# Patient Record
Sex: FEMALE | Race: WHITE | Employment: UNEMPLOYED | URBAN - METROPOLITAN AREA
[De-identification: names, ages, dates, MRNs, and addresses within clinical notes are randomized per-mention and may not be internally consistent; named-entity substitution may affect disease eponyms.]

---

## 2017-01-31 ENCOUNTER — TRANSFERRED RECORDS (OUTPATIENT)
Dept: HEALTH INFORMATION MANAGEMENT | Facility: CLINIC | Age: 22
End: 2017-01-31

## 2017-02-10 ENCOUNTER — TRANSFERRED RECORDS (OUTPATIENT)
Dept: HEALTH INFORMATION MANAGEMENT | Facility: CLINIC | Age: 22
End: 2017-02-10

## 2017-02-21 ENCOUNTER — MEDICAL CORRESPONDENCE (OUTPATIENT)
Dept: HEALTH INFORMATION MANAGEMENT | Facility: CLINIC | Age: 22
End: 2017-02-21

## 2017-02-23 ENCOUNTER — TELEPHONE (OUTPATIENT)
Dept: DERMATOLOGY | Facility: CLINIC | Age: 22
End: 2017-02-23

## 2017-02-23 NOTE — TELEPHONE ENCOUNTER
Contacted pt, VLC explained and pt accepted appt on April 5th at 8:30 am. New patient packet to be mailed as well as scheduling via call center. Pt verbalized understanding and denied questions or concerns. Request sent to call center.

## 2017-02-23 NOTE — TELEPHONE ENCOUNTER
----- Message from Jenna Archer RN sent at 2/23/2017 12:31 PM CST -----  Regarding: Schedule pt for VLC  Please set this patient up for VLC in April 5th.  She has an extensive VM right arm, there is MRI and is in PACs pt coming from [facility].  Per pt preference please call before 2 today or if on Monday before 2 pm.  Pt available Friday after 3pm.    Thanks,   A. Audra Archer, RN, BSN  Interventional Radiology Care Coordinator   Phone:  [phone]

## 2017-02-28 ENCOUNTER — PRE VISIT (OUTPATIENT)
Dept: DERMATOLOGY | Facility: CLINIC | Age: 22
End: 2017-02-28

## 2017-02-28 NOTE — TELEPHONE ENCOUNTER
1.  Date/reason for appt: 4/5/17 - extensive VM right arm     2.  Referring provider: Self     3.  Call to patient (Yes / No - short description): Yes, spoke with patient on the phone.     4.  Previous care at / records requested from:     1. [facility]   2. [facility]     5.  Other: Mailed ELMIRA's to sign.     MRA Chest 2/10/17 ([facility]) uploaded in PACS. - Need report

## 2017-03-09 NOTE — TELEPHONE ENCOUNTER
Radiology report received from [facility]. Waiting for images.   Included  Radiology reports: PICC line placement on 2/10/17   MRA chest on 2/10/17

## 2017-03-10 NOTE — TELEPHONE ENCOUNTER
Records received from [facility].   Included  Office notes: 8/25/15   [place] records: 5/10/11  Radiology reports: MRI right shoulder on 5/9/11- [place]

## 2017-03-17 NOTE — TELEPHONE ENCOUNTER
ELMIRA received for [facility] - faxed cover sheet for records/imaging.

## 2017-03-20 NOTE — TELEPHONE ENCOUNTER
[facility] password and report received. Waiting on imaging disc.     Records received from [facility] - Forwarded to Karin Lund.   Records include:  Plastic Surgery Consult 5/10/11  MRI Ext Upr Report 5/9/11

## 2017-04-05 ENCOUNTER — OFFICE VISIT (OUTPATIENT)
Dept: DERMATOLOGY | Facility: CLINIC | Age: 22
End: 2017-04-05
Attending: DERMATOLOGY
Payer: COMMERCIAL

## 2017-04-05 ENCOUNTER — OFFICE VISIT (OUTPATIENT)
Dept: DERMATOLOGY | Facility: CLINIC | Age: 22
End: 2017-04-05
Attending: RADIOLOGY
Payer: COMMERCIAL

## 2017-04-05 VITALS
BODY MASS INDEX: 37.17 KG/M2 | HEART RATE: 72 BPM | DIASTOLIC BLOOD PRESSURE: 66 MMHG | SYSTOLIC BLOOD PRESSURE: 109 MMHG | HEIGHT: 65 IN | WEIGHT: 223.11 LBS

## 2017-04-05 DIAGNOSIS — Q27.9 VENOUS MALFORMATION: Primary | ICD-10-CM

## 2017-04-05 LAB
APTT PPP: 28 SEC (ref 22–37)
BASOPHILS # BLD AUTO: 0 10E9/L (ref 0–0.2)
BASOPHILS NFR BLD AUTO: 0.2 %
D DIMER PPP FEU-MCNC: 0.4 UG/ML FEU (ref 0–0.5)
DIFFERENTIAL METHOD BLD: NORMAL
EOSINOPHIL # BLD AUTO: 0.1 10E9/L (ref 0–0.7)
EOSINOPHIL NFR BLD AUTO: 1.6 %
ERYTHROCYTE [DISTWIDTH] IN BLOOD BY AUTOMATED COUNT: 13.1 % (ref 10–15)
FIBRINOGEN PPP-MCNC: 401 MG/DL (ref 200–420)
HCT VFR BLD AUTO: 44.7 % (ref 35–47)
HGB BLD-MCNC: 15.3 G/DL (ref 11.7–15.7)
IMM GRANULOCYTES # BLD: 0 10E9/L (ref 0–0.4)
IMM GRANULOCYTES NFR BLD: 0.4 %
INR PPP: 0.9 (ref 0.86–1.14)
LYMPHOCYTES # BLD AUTO: 1.5 10E9/L (ref 0.8–5.3)
LYMPHOCYTES NFR BLD AUTO: 29.5 %
MCH RBC QN AUTO: 30.1 PG (ref 26.5–33)
MCHC RBC AUTO-ENTMCNC: 34.2 G/DL (ref 31.5–36.5)
MCV RBC AUTO: 88 FL (ref 78–100)
MONOCYTES # BLD AUTO: 0.7 10E9/L (ref 0–1.3)
MONOCYTES NFR BLD AUTO: 13.2 %
NEUTROPHILS # BLD AUTO: 2.7 10E9/L (ref 1.6–8.3)
NEUTROPHILS NFR BLD AUTO: 55.1 %
NRBC # BLD AUTO: 0 10*3/UL
NRBC BLD AUTO-RTO: 0 /100
PLATELET # BLD AUTO: 198 10E9/L (ref 150–450)
RBC # BLD AUTO: 5.08 10E12/L (ref 3.8–5.2)
WBC # BLD AUTO: 4.9 10E9/L (ref 4–11)

## 2017-04-05 PROCEDURE — 85384 FIBRINOGEN ACTIVITY: CPT | Performed by: DERMATOLOGY

## 2017-04-05 PROCEDURE — 85025 COMPLETE CBC W/AUTO DIFF WBC: CPT | Performed by: DERMATOLOGY

## 2017-04-05 PROCEDURE — 36415 COLL VENOUS BLD VENIPUNCTURE: CPT | Performed by: DERMATOLOGY

## 2017-04-05 PROCEDURE — 85379 FIBRIN DEGRADATION QUANT: CPT | Performed by: DERMATOLOGY

## 2017-04-05 PROCEDURE — 85397 CLOTTING FUNCT ACTIVITY: CPT | Performed by: DERMATOLOGY

## 2017-04-05 PROCEDURE — 85610 PROTHROMBIN TIME: CPT | Performed by: DERMATOLOGY

## 2017-04-05 PROCEDURE — 85730 THROMBOPLASTIN TIME PARTIAL: CPT | Performed by: DERMATOLOGY

## 2017-04-05 PROCEDURE — 99214 OFFICE O/P EST MOD 30 MIN: CPT | Mod: ZF

## 2017-04-05 RX ORDER — TRAMADOL HYDROCHLORIDE 50 MG/1
TABLET ORAL EVERY 6 HOURS PRN
Status: ON HOLD | COMMUNITY
End: 2020-08-25

## 2017-04-05 RX ORDER — TRIAMCINOLONE ACETONIDE 0.25 MG/G
CREAM TOPICAL 2 TIMES DAILY PRN
COMMUNITY

## 2017-04-05 RX ORDER — KETOCONAZOLE 20 MG/ML
SHAMPOO TOPICAL DAILY PRN
COMMUNITY

## 2017-04-05 NOTE — LETTER
4/5/2017      RE: Maritza Gallardo  [address] [place] [address]         INTERVENTIONAL RADIOLOGY CONSULTATION    Name: Maritza Gallardo  Age: 21 year old   Referring Physician: Dr. 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 Slowly, over the next few years, this extended to involve her entire right arm and hand. No pain until adolescence. She was evaluated at [facility] in 2011, at which time they recommended percutaneous sclerotherapy and laser therapy.   She opted to pursue no treatment at that time.      Over the last several years, she has continued to have intermittent discomfort in the right  arm.  She is not able to do any heavy lifting or much activity since that worsens her pain.  If she does excessive activity, she will get pain and swelling of the arm. Pain is achy, can be as bad as 7/10. Pain improves with rest. Over the last 6 months or so, she has developed a few occasions during which time she developed a tender, hard bump within the lesion.   Most recently, this occurred in her right hand.  She was seen and given an antibiotic, name unknown, as well as prednisone, neither were helpful.  These bumps tend to last 2-3 months.  She denies fever or cellulitis.       Years ago she started oral contraceptive pills for heavy menses, currently well controlled on combined OCP.        In 02/2017, she presented to [facility] in [place] with arm pain. MRI was performed, and she was referred to our vascular lesions clinic.  At that time, she was also provided with a custom-fit compression arm sleeve, which she admits that she has only worn for 2 days without perceived benefit.  She has never taken oral medications or had procedures  "done on the arm.     PAST MEDICAL HISTORY:   History reviewed. No pertinent past medical history.    PAST SURGICAL HISTORY:   History reviewed. No pertinent surgical history.    FAMILY HISTORY:   History reviewed. No pertinent family history.    SOCIAL HISTORY:   Social History   Substance Use Topics     Smoking status: Light Tobacco Smoker     Smokeless tobacco: Not on file     Alcohol use Not on file       PROBLEM LIST:   There are no active problems to display for this patient.      MEDICATIONS:   Prescription Medications as of 4/5/2017             Biotin 2500 MCG CAPS     Cetirizine HCl (ZYRTEC ALLERGY PO)     NONFORMULARY Cranberry Extract    ketoconazole (NIZORAL) 2 % shampoo Apply topically daily as needed for itching or irritation    Multiple Vitamins-Minerals (MULTIVITAMIN ADULT PO)     Norgestim-Eth Estrad Triphasic (NORGESTIMATE-ETHINYL ESTRADIOL OR)     traMADol (ULTRAM) 50 MG tablet Take by mouth every 6 hours as needed for moderate pain    triamcinolone (KENALOG) 0.025 % cream Apply topically 2 times daily          ALLERGIES:   Adhesive tape and Seasonal allergies    ROS:  Skin: as above  Eyes: negative  Ears/Nose/Throat: negative  Respiratory: No shortness of breath, cough  Cardiovascular: negative  Gastrointestinal: negative  Genitourinary: as above  Musculoskeletal: as above  Neurologic: negative  Psychiatric: negative      Physical Examination:   VITALS:   /66 (BP Location: Left arm, Patient Position: Chair, Cuff Size: Adult Large)  Pulse 72  Ht 1.641 m (5' 4.61\")  Wt 101.2 kg (223 lb 1.7 oz)  BMI 37.58 kg/m2  Constitutional: healthy,alert,no distress  Head: Normocephalic.   Cardiovascular: RRR. No murmur  Respiratory: Lungs clear  Musculoskeletal: Blue subcutaneous vascular papules from right anterior shoulder extends all the way down the right arm and extends onto the palmar right hand.  Tender to deep palpation. There is no edema or phleboliths today. Lesion soft, non-pulsatile.  Skin: " blue papules as above  Psychiatric: mentation appears normal, affect normal/bright    Labs:    BMP RESULTS:  No results found for: NA, POTASSIUM, CHLORIDE, CO2, ANIONGAP, GLC, BUN, CR, GFRESTIMATED, GFRESTBLACK, HELENE     CBC RESULTS:  No results found for: WBC, RBC, HGB, HCT, MCV, MCH, MCHC, RDW, PLT    INR/PTT:  No results found for: INR, PTT    Diagnostic studies:   MRI 2/10/17 reviewed by me. Extensive slow krystal lesion involves entire right arm nd adjacent chest wall.     Assessment: 22 yo F with extensive slow flow vascular malformation involving the entire right arm and adjacent chest wall. It is causing significant pain and limits arm use.     Plan   1. Wear medical grade compression garment during waking hours for symptoms relief.  2. Sclerotherapy for pain reduction. Given lesion extent/size 3-4 sessions spaced 3-4 weeks between session may be required. First session will take 2.5 hours  3. Consult GYN for management of OCPs.    It was a pleasure to see Maritza in clinic today. Thank you for involving the Interventional Radiology service in her care.     I spent a total of 30 minutes with this patient, over 50% time was for counseling/care coordination.    Roseline Marcial MD  Interventional Radiology Attending  [facility]     CC  Patient Care Team:  Deborah Carson NP as PCP - General (Nurse Practitioner)  Lucita Alcazar RN as Registered Nurse

## 2017-04-05 NOTE — LETTER
4/5/2017      RE: Maritza Gallardo  [address] [address]       [facility] New Patient Visit    CHIEF COMPLAINT:  Vascular malformation of the right arm.      HISTORY OF PRESENT ILLNESS:  This is a 21-year-old female who is seen in the [facility] today for evaluation of a vascular lesion of her right arm.  She is here with her grandmother today.  History is provided by the patient herself as well as grandmother.  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  Slowly over the next few years, this extended to involve her entire right arm as well as her hand.  This did not cause any significant symptoms for her until early in adolescence, at which time she started developing pain in the arm.  She is right-handed.  She was evaluated at the [facility] in 2011 when she was 15 years old.  At that time imaging was completed, and recommendations at that time included sclerotherapy with sotradecol as well as laser therapy.  They opted to pursue no treatment at that time.  Over the last 6 years, she has continued to have intermittent discomfort in this arm.  She is not able to do any heavy lifting or much activity with this right arm.  If she does excessive activity, she will get pain and swelling of the arm.  Over the last 6 months or so, she has developed a few occasions during which time she has had a hard bump within the lesion itself.  The bump can get quite tender and sore.  Most recently, this occurred in her right hand.  She was seen and given an antibiotic, name unknown, as well as a course of prednisone.  This was not particularly helpful.  These bumps tend to last 2-3 months.  She denies fever or signs of cellulitis during these episodes.      Years ago she started oral contraceptive pills.  She later switched to the Depo shot, which she took for almost 2 years; she stopped this because of problematic bleeding.  In  the late fall of 2016, she resumed a combined oral contraceptive pill.  She states that her periods were heavy prior to starting the pill years ago, but have not been heavy since.        In 02/2017, she presented to [facility] in [place], and at that time a repeat MRI was performed, and she was referred to this clinic.  At that time, she was also provided with a custom-fit compression arm sleeve, which she admits that she has only worn for 2 days without noticing any difference.  She has never taken oral medications for her arm.  She has never had blood workup that she knows of that was related to her arms.  She has never had surgical procedures on the arm.      PAST MEDICAL HISTORY:  Otherwise unremarkable.      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  Her responsibilities will not include heavy lifting; they are mostly supervisory and related to leading activities.  She lives with her grandmother.      FAMILY HISTORY:  No family history of similar lesions.      MEDICATIONS:   Current Outpatient Prescriptions   Medication     Biotin 2500 MCG CAPS     Cetirizine HCl (ZYRTEC ALLERGY PO)     NONFORMULARY     ketoconazole (NIZORAL) 2 % shampoo     Multiple Vitamins-Minerals (MULTIVITAMIN ADULT PO)     Norgestim-Eth Estrad Triphasic (NORGESTIMATE-ETHINYL ESTRADIOL OR)     traMADol (ULTRAM) 50 MG tablet     triamcinolone (KENALOG) 0.025 % cream     No current facility-administered medications for this visit.         ALLERGIES:       Allergies   Allergen Reactions     Adhesive Tape Blisters     Seasonal Allergies           REVIEW OF SYSTEMS:  A 12 point review of systems is performed and is otherwise unremarkable.      PHYSICAL EXAMINATION:     VITAL SIGNS:  See epic  GENERAL:  This is a well-appearing adult female in no distress.   Eyes: conjunctivae clear  Neck: supple  Resp: breathing comfortably in no  distress  CV: well-perfused, no cyanosis  Abd: no distension  SKIN:  Examination is performed of the waist up skin including head/neck/face, chest, abdomen, back, bilateral arms and hands.  It is remarkable for interrupted blue subcutaneous vascular papules on the right anterior shoulder, and this extends all the way down the right arm, mostly medially.  It extends onto the palmar right hand.  Tender to deep palpation.  There is no edema noted today.  Photos taken.      ASSESSMENT AND PLAN:    1. Extensive venous malformation of right upper extremity, causing discomfort and functional impairment.    2. Phleboliths, by history      Maritza was evaluated in our multidisciplinary Vascular Lesions Clinic here at the [facility]. This specialized clinic offers interdisciplinary evaluation for patients with complex vascular anomalies. Multiple specialists were present in our evaluation today including myself, Dr.'s Ange Rowley, Ghislaine Bautista from Pediatric Dermatology, Dr. Roseline Marcial from Pediatric Interventional Radiology and Dr. Jose Luis Matamoros from Plastic Surgery, all of whom reviewed the case and imaging today.      At this time, our recommendations are as follows:   1.  Recommend committed trial of compression garment.  I explained that it may take months for her to deem benefit from this sleeve, but it should overall allow her to complete more activities with this arm and hopefully result in less painful swelling over time.  She is agreeing to do a trial of this.   2.  We will obtain D-dimer, fibrinogen, CBC, PT and PTT, as well as prothrombin fragment today to assess for any localized intervascular coagulation.  Could consider short trials of aspirin during phlebolith formation in the future, although if they pursue sclerotherapy, we would likely postpone this treatment until after sclerotherapy is complete.   3.  Dr. Marcial talked at length with her about the  potential benefits of local sclerotherapy.  She would likely require initially about 5 treatments  by a month apart.  This could be done in conjunction with wearing of a compression garment.   4.  We recommend against surgical therapy at this time, since the surgery would need to be quite extensive and would not be curative.   5.  We have referred her to Dr. Linda Pritchard to discuss whether or not alternative oral contraceptive or other contraceptive options would be warranted here, since I am suspicious that her combined pill is likely contributing to some of the symptoms she is experiencing in her lesion.      Follow up in this office will be determined by the treatment course that is pursued.     Elaine Coon MD  , Pediatric Dermatology    Addendum:  Results for orders placed or performed in visit on 04/05/17   D dimer quantitative   Result Value Ref Range    D Dimer 0.4 0.0 - 0.50 ug/ml FEU   Partial thromboplastin time   Result Value Ref Range    PTT 28 22 - 37 sec   Prothrombin fragment   Result Value Ref Range    Prothrombin Fragment 0.10 <0.35 nmol/L   INR   Result Value Ref Range    INR 0.90 0.86 - 1.14   CBC with platelets differential   Result Value Ref Range    WBC 4.9 4.0 - 11.0 10e9/L    RBC Count 5.08 3.8 - 5.2 10e12/L    Hemoglobin 15.3 11.7 - 15.7 g/dL    Hematocrit 44.7 35.0 - 47.0 %    MCV 88 78 - 100 fl    MCH 30.1 26.5 - 33.0 pg    MCHC 34.2 31.5 - 36.5 g/dL    RDW 13.1 10.0 - 15.0 %    Platelet Count 198 150 - 450 10e9/L    Diff Method Automated Method     % Neutrophils 55.1 %    % Lymphocytes 29.5 %    % Monocytes 13.2 %    % Eosinophils 1.6 %    % Basophils 0.2 %    % Immature Granulocytes 0.4 %    Nucleated RBCs 0 0 /100    Absolute Neutrophil 2.7 1.6 - 8.3 10e9/L    Absolute Lymphocytes 1.5 0.8 - 5.3 10e9/L    Absolute Monocytes 0.7 0.0 - 1.3 10e9/L    Absolute Eosinophils 0.1 0.0 - 0.7 10e9/L    Absolute Basophils 0.0 0.0 - 0.2 10e9/L    Abs Immature  Granulocytes 0.0 0 - 0.4 10e9/L    Absolute Nucleated RBC 0.0    Fibrinogen activity   Result Value Ref Range    Fibrinogen 401 200 - 420 mg/dL     Patient informed that results are normal/reassuring.     CC: Deborah Carson  [facility] [address] [place] [id number]    CC: Maritza Gallardo (patient)    CC: Roseline Marcial: Interventional Radiology    CC: Linda Pritchard: OB/GYN

## 2017-04-05 NOTE — MR AVS SNAPSHOT
After Visit Summary   4/5/2017    Maritza Gallardo    MRN: [id number]           Patient Information     Date Of Birth          1995        Visit Information        Provider Department      4/5/2017 8:30 AM Roseline Marcial MD [facility] Vascular Lesions Clinic        Today's Diagnoses     Venous malformation    -  1      Care Instructions    PEDIATRIC VASCULAR LESIONS CLINIC  [facility]- 12 th Floor    Today you were seen in our Pediatric Vascular Lesions Clinic by one or several off the Physicians listed below:    Dr. Ange Rowley, Dr. Elaine Coon and Dr. Ghislaine Bautista (Pediatric Dermatology) #[phone]  Dr. Ramy Casiano and Dr. Babak Sawyer (Pediatric Surgeon) #[phone]  Dr. Jose Luis Matamoros (Plastic and Reconstructive Surgery) # [phone]  Dr. Dudley Ward (Pediatric Otolaryngology) # [phone]  Dr. Roseline Marcial (Pediatric Interventional Radiology) #[phone]  Dr. Nery Galicia (Pediatric Hematologist-Oncologist) #[phone]  Dr. Cristi Cadet (Pediatric Orthopaedic Surgeon) # [phone]  Dr. Jay Bañuelos (Pediatric Ophthalmology) # [phone] ([facility])    The Physicians office that referred you to the Vascular Lesions Clinic will be in contact with you within a few weeks regarding a further plan of care, testing, appointments and any additional information from your visit.     Clinic phone numbers have been provided should you need to call to set up any appointments with one of the specific providers in the future.     You may have additional co-pays for provider consults who will be directly involved in your care.     If additional imaging is recommended, please call [phone] to schedule these appointments.     Thank you for your participation in the Vascular Lesions Clinic!       Venous Malformation of Right upper extremity- too many blood vessels mostly made up of veins in this affected area.  On  "imaging- this is mostly superficial (in the subcutaneous tissue) but does appear to extend into the muscles.  Dr. Marcial feels what extend of malformation we see today will be it but you can get more symptoms over time.     This birthmark does not predispose you to cancer  Likely this extends into the muscles which can cause pain as they can dilate over time.   Another complication is there is also blood in this area and this malformation can develop tiny little clots that can cause pain. These will resolve and there are tips we can advise on, if you ever have these painful episodes     Plan:  1. Birth control pills- we would love for you to meet with Dr. Linda Pritchard. She is the OB GYN who works with our [facility] group to further discuss your options regarding your periods. We will ask her staff to contact you for an appt.   2. Compression stocking- Please begin using this. This will help with symptoms but will take weeks to months to see its full effects. This will prevent the veins from being able to \"stretch out\" and may allow you to do more things that would otherwise cause swelling and discomfort.   3. Today we elected to have blood drawn. We will call you with these results once they are back. We are checking to see if your body is producing these tiny clots. These clots can occur without you even knowing or having pain.   4. Sclerotherapy is a treatment option- see information below. Maritza would need several treatments, as we can not treat her entire malformation in on treatment. Each treated area takes about 3 months to see full effects. We can treat different areas about every 3-4 weeks apart. Plan to be here for about 6 hours each treatment.   5. Doing nothing- the vessels will dilate over time- you could get more symptomatic and may lose certain functions of the arm.     The groups believes with a combination of the compression sleeve and sclerotherapy are Maritza's best treatment options.     You can " take your time about considering your options.     If you have additional questions please feel free to contact either Dr. Marcial or Dr. Coon.   If you wish to pursue the sclerotherapy you can contact Dr. Daley staff to schedule  You can try to coordinate your appt with Dr. Pritchard and Dr. Marcial.   Physical therapy may be a recommendation in the future. This could be completed closer to home. The goal with this would be to build strength in the arm       You have been referred for sclerotherapy with Dr. Marcial in Interventional Radiology. Sclerotherapy is a non-surgical procedure that uses ultrasound guidance to treat abnormal vessels (vascular malformations). This procedure can be used on abnormal vessels in many parts of the body. While you are under sedation, Dr. Marcial will inject a chemical (sclerosant) into the abnormal vessels that causes then to clot and become inflamed and irritated. This process causes pain and swelling in the treatment area, but the intent is the treated vessels will no longer fill with blood/fluid and scar down causing shrinkage in the vascular malformation and symptom improvement. This is rarely curative, but does improve symptoms. Lesions may require multiple treatments to achieve good symptom control. After the treatment, you need to be prepared to rest (no vigorous activity x 5 days) and you may need to use crutches if the malformation is in the leg. You will also have to keep a compression dressing applied to the site. Typically, pain is worst from day 1 to day 5, then gradually improves. Pain is typically well controlled with Ibuprofen only, but additional pain medications can be provided if needed. Before we can schedule the procedure, we will check to make sure your insurance approves this procedure.           Follow-ups after your visit        Who to contact     Please call your clinic at [phone] to:    Ask questions about your health    Make or cancel  "appointments    Discuss your medicines    Learn about your test results    Speak to your doctor   If you have compliments or concerns about an experience at your clinic, or if you wish to file a complaint, please contact [facility] Physicians Patient Relations at [phone] or email us at [email or web address]         Additional Information About Your Visit        Care EveryWhere ID     This is your Care EveryWhere ID. This could be used by other organizations to access your [place] medical records  [id number]        Your Vitals Were     Pulse Height BMI (Body Mass Index)             72 5' 4.61\" (164.1 cm) 37.58 kg/m2          Blood Pressure from Last 3 Encounters:   04/05/17 109/66    Weight from Last 3 Encounters:   04/05/17 223 lb 1.7 oz (101.2 kg)              We Performed the Following     CBC with platelets differential     D dimer quantitative     Fibrinogen activity     INR     Partial thromboplastin time     Prothrombin fragment        Primary Care Provider Office Phone # Fax #    Deborah Yue Carson [phone]       [place] [address]        Thank you!     Thank you for choosing [facility]ASCULAR [facility]ESIONS CLINIC  for your care. Our goal is always to provide you with excellent care. Hearing back from our patients is one way we can continue to improve our services. Please take a few minutes to complete the written survey that you may receive in the mail after your visit with us. Thank you!             Your Updated Medication List - Protect others around you: Learn how to safely use, store and throw away your medicines at www.disposemymeds.org.          This list is accurate as of: 4/5/17  9:44 AM.  Always use your most recent med list.                   Brand Name Dispense Instructions for use    Biotin 2500 MCG Caps          MULTIVITAMIN ADULT PO          NIZORAL 2 % shampoo   Generic drug:  ketoconazole      Apply topically " daily as needed for itching or irritation       NONFORMULARY      Cranberry Extract       NORGESTIMATE-ETHINYL ESTRADIOL OR          traMADol 50 MG tablet    ULTRAM     Take by mouth every 6 hours as needed for moderate pain       triamcinolone 0.025 % cream    KENALOG     Apply topically 2 times daily       ZYRTEC ALLERGY PO

## 2017-04-05 NOTE — PATIENT INSTRUCTIONS
PEDIATRIC VASCULAR LESIONS CLINIC  [facility]- 12 th Floor    Today you were seen in our Pediatric Vascular Lesions Clinic by one or several off the Physicians listed below:    Dr. Ange Rowley, Dr. Elaine Coon and Dr. Ghislaine Bautista (Pediatric Dermatology) #[phone]  Dr. Ramy Casiano and Dr. Babak Sawyer (Pediatric Surgeon) #[phone]  Dr. Jose Luis Matamoros (Plastic and Reconstructive Surgery) # [phone]  Dr. Dudley Ward (Pediatric Otolaryngology) # [phone]  Dr. Roseline Marcial (Pediatric Interventional Radiology) #[phone]  Dr. Nery Galicia (Pediatric Hematologist-Oncologist) #[phone]  Dr. Cristi Cadet (Pediatric Orthopaedic Surgeon) # [phone]  Dr. Jay Bañuelos (Pediatric Ophthalmology) # [phone] ([facility])    The Physicians office that referred you to the Vascular Lesions Clinic will be in contact with you within a few weeks regarding a further plan of care, testing, appointments and any additional information from your visit.     Clinic phone numbers have been provided should you need to call to set up any appointments with one of the specific providers in the future.     You may have additional co-pays for provider consults who will be directly involved in your care.     If additional imaging is recommended, please call [phone] to schedule these appointments.     Thank you for your participation in the Vascular Lesions Clinic!       Venous Malformation of Right upper extremity- too many blood vessels mostly made up of veins in this affected area.  On imaging- this is mostly superficial (in the subcutaneous tissue) but does appear to extend into the muscles.  Dr. Marcial feels what extend of malformation we see today will be it but you can get more symptoms over time.     This birthmark does not predispose you to cancer  Likely this extends into the muscles which can cause pain as they can dilate over time.  "  Another complication is there is also blood in this area and this malformation can develop tiny little clots that can cause pain. These will resolve and there are tips we can advise on, if you ever have these painful episodes     Plan:  1. Birth control pills- we would love for you to meet with Dr. Linda Pritchard. She is the OB GYN who works with our [facility] group to further discuss your options regarding your periods. We will ask her staff to contact you for an appt.   2. Compression stocking- Please begin using this. This will help with symptoms but will take weeks to months to see its full effects. This will prevent the veins from being able to \"stretch out\" and may allow you to do more things that would otherwise cause swelling and discomfort.   3. Today we elected to have blood drawn. We will call you with these results once they are back. We are checking to see if your body is producing these tiny clots. These clots can occur without you even knowing or having pain.   4. Sclerotherapy is a treatment option- see information below. Maritza would need several treatments, as we can not treat her entire malformation in on treatment. Each treated area takes about 3 months to see full effects. We can treat different areas about every 3-4 weeks apart. Plan to be here for about 6 hours each treatment.   5. Doing nothing- the vessels will dilate over time- you could get more symptomatic and may lose certain functions of the arm.     The groups believes with a combination of the compression sleeve and sclerotherapy are Maritza's best treatment options.     You can take your time about considering your options.     If you have additional questions please feel free to contact either Dr. Marcial or Dr. Coon.   If you wish to pursue the sclerotherapy you can contact Dr. Daley staff to schedule  You can try to coordinate your appt with Dr. Pritchard and Dr. Marcial.   Physical therapy may be a recommendation in the future. " This could be completed closer to home. The goal with this would be to build strength in the arm       You have been referred for sclerotherapy with Dr. Marcial in Interventional Radiology. Sclerotherapy is a non-surgical procedure that uses ultrasound guidance to treat abnormal vessels (vascular malformations). This procedure can be used on abnormal vessels in many parts of the body. While you are under sedation, Dr. Marcial will inject a chemical (sclerosant) into the abnormal vessels that causes then to clot and become inflamed and irritated. This process causes pain and swelling in the treatment area, but the intent is the treated vessels will no longer fill with blood/fluid and scar down causing shrinkage in the vascular malformation and symptom improvement. This is rarely curative, but does improve symptoms. Lesions may require multiple treatments to achieve good symptom control. After the treatment, you need to be prepared to rest (no vigorous activity x 5 days) and you may need to use crutches if the malformation is in the leg. You will also have to keep a compression dressing applied to the site. Typically, pain is worst from day 1 to day 5, then gradually improves. Pain is typically well controlled with Ibuprofen only, but additional pain medications can be provided if needed. Before we can schedule the procedure, we will check to make sure your insurance approves this procedure.

## 2017-04-05 NOTE — PROGRESS NOTES
INTERVENTIONAL RADIOLOGY CONSULTATION    Name: Maritza Gallardo  Age: 21 year old   Referring Physician: Dr. 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 Slowly, over the next few years, this extended to involve her entire right arm and hand. No pain until adolescence. She was evaluated at [facility] in 2011, at which time they recommended percutaneous sclerotherapy and laser therapy.  She opted to pursue no treatment at that time.      Over the last several years, she has continued to have intermittent discomfort in the right  arm.  She is not able to do any heavy lifting or much activity since that worsens her pain.  If she does excessive activity, she will get pain and swelling of the arm. Pain is achy, can be as bad as 7/10. Pain improves with rest. Over the last 6 months or so, she has developed a few occasions during which time she developed a tender, hard bump within the lesion.  Most recently, this occurred in her right hand.  She was seen and given an antibiotic, name unknown, as well as prednisone, neither were helpful.  These bumps tend to last 2-3 months.  She denies fever or cellulitis.       Years ago she started oral contraceptive pills for heavy menses, currently well controlled on combined OCP.        In 02/2017, she presented to [facility] in [place] with arm pain. MRI was performed, and she was referred to our vascular lesions clinic.  At that time, she was also provided with a custom-fit compression arm sleeve, which she admits that she has only worn for 2 days without perceived benefit.  She has never taken oral medications or had procedures done on the arm.     PAST MEDICAL HISTORY:   History reviewed. No pertinent past medical  "history.    PAST SURGICAL HISTORY:   History reviewed. No pertinent surgical history.    FAMILY HISTORY:   History reviewed. No pertinent family history.    SOCIAL HISTORY:   Social History   Substance Use Topics     Smoking status: Light Tobacco Smoker     Smokeless tobacco: Not on file     Alcohol use Not on file       PROBLEM LIST:   There are no active problems to display for this patient.      MEDICATIONS:   Prescription Medications as of 4/5/2017             Biotin 2500 MCG CAPS     Cetirizine HCl (ZYRTEC ALLERGY PO)     NONFORMULARY Cranberry Extract    ketoconazole (NIZORAL) 2 % shampoo Apply topically daily as needed for itching or irritation    Multiple Vitamins-Minerals (MULTIVITAMIN ADULT PO)     Norgestim-Eth Estrad Triphasic (NORGESTIMATE-ETHINYL ESTRADIOL OR)     traMADol (ULTRAM) 50 MG tablet Take by mouth every 6 hours as needed for moderate pain    triamcinolone (KENALOG) 0.025 % cream Apply topically 2 times daily          ALLERGIES:   Adhesive tape and Seasonal allergies    ROS:  Skin: as above  Eyes: negative  Ears/Nose/Throat: negative  Respiratory: No shortness of breath, cough  Cardiovascular: negative  Gastrointestinal: negative  Genitourinary: as above  Musculoskeletal: as above  Neurologic: negative  Psychiatric: negative      Physical Examination:   VITALS:   /66 (BP Location: Left arm, Patient Position: Chair, Cuff Size: Adult Large)  Pulse 72  Ht 1.641 m (5' 4.61\")  Wt 101.2 kg (223 lb 1.7 oz)  BMI 37.58 kg/m2  Constitutional: healthy,alert,no distress  Head: Normocephalic.   Cardiovascular: RRR. No murmur  Respiratory: Lungs clear  Musculoskeletal: Blue subcutaneous vascular papules from right anterior shoulder extends all the way down the right arm and extends onto the palmar right hand.  Tender to deep palpation. There is no edema or phleboliths today. Lesion soft, non-pulsatile.  Skin: blue papules as above  Psychiatric: mentation appears normal, affect " normal/bright    Labs:    BMP RESULTS:  No results found for: NA, POTASSIUM, CHLORIDE, CO2, ANIONGAP, GLC, BUN, CR, GFRESTIMATED, GFRESTBLACK, HELENE     CBC RESULTS:  No results found for: WBC, RBC, HGB, HCT, MCV, MCH, MCHC, RDW, PLT    INR/PTT:  No results found for: INR, PTT    Diagnostic studies:   MRI 2/10/17 reviewed by me. Extensive slow krystal lesion involves entire right arm nd adjacent chest wall.     Assessment: 22 yo F with extensive slow flow vascular malformation involving the entire right arm and adjacent chest wall. It is causing significant pain and limits arm use.     Plan   1. Wear medical grade compression garment during waking hours for symptoms relief.  2. Sclerotherapy for pain reduction. Given lesion extent/size 3-4 sessions spaced 3-4 weeks between session may be required. First session will take 2.5 hours  3. Consult GYN for management of OCPs.    It was a pleasure to see Maritza in clinic today. Thank you for involving the Interventional Radiology service in her care.     I spent a total of 30 minutes with this patient, over 50% time was for counseling/care coordination.    Roseline Marcial MD  Interventional Radiology Attending  [facility]           CC  Patient Care Team:  Deborah Carson NP as PCP - General (Nurse Practitioner)  Lucita Alcazar RN as Registered Nurse  Roseline Marcial MD as MD (Radiology)  REFERRING DR, UNKNOWN

## 2017-04-05 NOTE — MR AVS SNAPSHOT
After Visit Summary   4/5/2017    Maritza Gallardo    MRN: [id number]           Patient Information     Date Of Birth          1995        Visit Information        Provider Department      4/5/2017 8:30 AM Elaine Coon MD [facility] Peds Vascular Lesions Clinic        Today's Diagnoses     Venous malformation    -  1       Follow-ups after your visit        Your next 10 appointments already scheduled     Apr 17, 2017   Procedure with GENERIC ANESTHESIA PROVIDER   [facility], [place], Same Day Surgery (--)    [address] [place] [id number] [phone]            Apr 17, 2017 11:30 AM CDT   IR VEIN SPIDER NON FACE SCLEROTHERAPY with URIR1, UR IR RAD   [facility],  Interventional Radiology ([facility])    [address] [place] [id number] [phone]           1. Your doctor will need to do a history and physical within 30 days before this procedure. 2. Your doctor will determine whether you need a 12 lead EKG. 3. Laboratory tests are to be obtained by your doctor prior to the exam (creatinine, hepatic panel, Hgb/Hct, platelet count, and INR) 4. Bring a list of all drugs you are taking; include supplements and over-the-counter medications. 5. Wear comfortable clothes and leave all valuables at home. 6. If you have allergies to x-ray contrast or iodine, contact your doctor or a Radiology nurse prior to the exam day for instructions. 7. If you are or may be pregnant, contact your doctor or a Radiology nurse prior to the day of the exam. 8. If you have diabetes, check with your doctor or a Radiology nurse to see if your insulin needs to be adjusted for the exam. 9. If you are taking Coumadin (to thin your blood) please contact your doctor or a Radiology nurse at least 5 days before the exam for special instructions. 10. You should not have received contrast within 48 hours of this exam. 11. The day before your exam you may eat  your regular diet and are encouraged to drink at least 2 quarts of clear liquids. Drink no alcoholic beverages for 24 hours prior to the exam. 12. If you have a colostomy you will need to irrigate it with tap water at 8PM the evening before and again at 6AM the morning of the exam. 13. Do not smoke for 24 hours prior to the procedure. 14. Do not eat any solid food or milk products for 6 hours prior to the exam. You may drink clear liquids until 2 hours prior to the exam. Clear liquids include the following: water, Jell-O, clear broth, apple juice or any noncarbonated drink that you can see through (no pop!) 15. The morning of the exam you may brush your teeth and take medications as directed with a sip of water. 16. Tell the Radiology nurse if you have any allergies. 17. You will be asked to empty your bladder before the exam begins. 18. Following the exam you will need to remain on complete bedrest for 4-6 hours. The nurse will monitor your vital signs, puncture site, and the pulses and temperature of the leg that was punctured.              Who to contact     Please call your clinic at [phone] to:    Ask questions about your health    Make or cancel appointments    Discuss your medicines    Learn about your test results    Speak to your doctor   If you have compliments or concerns about an experience at your clinic, or if you wish to file a complaint, please contact [facility] Physicians Patient Relations at [phone] or email us at [email or web address]         Additional Information About Your Visit        Care EveryWhere ID     This is your Care EveryWhere ID. This could be used by other organizations to access your [facility] medical records  [id number]         Blood Pressure from Last 3 Encounters:   04/05/17 109/66    Weight from Last 3 Encounters:   04/05/17 223 lb 1.7 oz (101.2 kg)              Today, you had the following     No orders found for display       Primary Care  Provider Office Phone # Fax #    Deborah Carson [phone]       [place] [phone]        Thank you!     Thank you for choosing [facility] VASCULAR LESIONS CLINIC  for your care. Our goal is always to provide you with excellent care. Hearing back from our patients is one way we can continue to improve our services. Please take a few minutes to complete the written survey that you may receive in the mail after your visit with us. Thank you!             Your Updated Medication List - Protect others around you: Learn how to safely use, store and throw away your medicines at www.disposemymeds.org.          This list is accurate as of: 4/5/17 11:59 PM.  Always use your most recent med list.                   Brand Name Dispense Instructions for use    Biotin 2500 MCG Caps          MULTIVITAMIN ADULT PO          NIZORAL 2 % shampoo   Generic drug:  ketoconazole      Apply topically daily as needed for itching or irritation       NONFORMULARY      Cranberry Extract       NORGESTIMATE-ETHINYL ESTRADIOL OR          traMADol 50 MG tablet    ULTRAM     Take by mouth every 6 hours as needed for moderate pain       triamcinolone 0.025 % cream    KENALOG     Apply topically 2 times daily       ZYRTEC ALLERGY PO

## 2017-04-05 NOTE — NURSING NOTE
"Chief Complaint   Patient presents with     Consult     VM right arm      /66 (BP Location: Left arm, Patient Position: Chair, Cuff Size: Adult Large)  Pulse 72  Ht 5' 4.61\" (164.1 cm)  Wt 223 lb 1.7 oz (101.2 kg)  BMI 37.58 kg/m2  Rosemary Manzano LPN    "

## 2017-04-05 NOTE — PROGRESS NOTES
[facility]    CHIEF COMPLAINT:  Vascular malformation of the right arm.      HISTORY OF PRESENT ILLNESS:  This is a 21-year-old female who is seen in the [facility] today for evaluation of a vascular lesion of her right arm.  She is here with her grandmother today.  History is provided by the patient herself as well as grandmother.  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  Slowly over the next few years, this extended to involve her entire right arm as well as her hand.  This did not cause any significant symptoms for her until early in adolescence, at which time she started developing pain in the arm.  She is right-handed.  She was evaluated at the [facility] in 2011 when she was 15 years old.  At that time imaging was completed, and recommendations at that time included sclerotherapy with sotradecol as well as laser therapy.  They opted to pursue no treatment at that time.  Over the last 6 years, she has continued to have intermittent discomfort in this arm.  She is not able to do any heavy lifting or much activity with this right arm.  If she does excessive activity, she will get pain and swelling of the arm.  Over the last 6 months or so, she has developed a few occasions during which time she has had a hard bump within the lesion itself.  The bump can get quite tender and sore.  Most recently, this occurred in her right hand.  She was seen and given an antibiotic, name unknown, as well as a course of prednisone.  This was not particularly helpful.  These bumps tend to last 2-3 months.  She denies fever or signs of cellulitis during these episodes.      Years ago she started oral contraceptive pills.  She later switched to the Depo shot, which she took for almost 2 years; she stopped this because of problematic bleeding.  In the late fall of 2016, she resumed a combined oral contraceptive pill.  She states  that her periods were heavy prior to starting the pill years ago, but have not been heavy since.        In 02/2017, she presented to [facility] in [place], and at that time a repeat MRI was performed, and she was referred to this clinic.  At that time, she was also provided with a custom-fit compression arm sleeve, which she admits that she has only worn for 2 days without noticing any difference.  She has never taken oral medications for her arm.  She has never had blood workup that she knows of that was related to her arms.  She has never had surgical procedures on the arm.      PAST MEDICAL HISTORY:  Otherwise unremarkable.      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  Her responsibilities will not include heavy lifting; they are mostly supervisory and related to leading activities.  She lives with her grandmother.      FAMILY HISTORY:  No family history of similar lesions.      MEDICATIONS:   Current Outpatient Prescriptions   Medication     Biotin 2500 MCG CAPS     Cetirizine HCl (ZYRTEC ALLERGY PO)     NONFORMULARY     ketoconazole (NIZORAL) 2 % shampoo     Multiple Vitamins-Minerals (MULTIVITAMIN ADULT PO)     Norgestim-Eth Estrad Triphasic (NORGESTIMATE-ETHINYL ESTRADIOL OR)     traMADol (ULTRAM) 50 MG tablet     triamcinolone (KENALOG) 0.025 % cream     No current facility-administered medications for this visit.         ALLERGIES:       Allergies   Allergen Reactions     Adhesive Tape Blisters     Seasonal Allergies           REVIEW OF SYSTEMS:  A 12 point review of systems is performed and is otherwise unremarkable.      PHYSICAL EXAMINATION:     VITAL SIGNS:  See epic  GENERAL:  This is a well-appearing adult female in no distress.   Eyes: conjunctivae clear  Neck: supple  Resp: breathing comfortably in no distress  CV: well-perfused, no cyanosis  Abd: no distension  SKIN:  Examination is performed  of the waist up skin including head/neck/face, chest, abdomen, back, bilateral arms and hands.  It is remarkable for interrupted blue subcutaneous vascular papules on the right anterior shoulder, and this extends all the way down the right arm, mostly medially.  It extends onto the palmar right hand.  Tender to deep palpation.  There is no edema noted today.  Photos taken.      ASSESSMENT AND PLAN:    1. Extensive venous malformation of right upper extremity, causing discomfort and functional impairment.    2. Phleboliths, by history      Maritza was evaluated in our multidisciplinary Vascular Lesions Clinic here at the [facility]. This specialized clinic offers interdisciplinary evaluation for patients with complex vascular anomalies. Multiple specialists were present in our evaluation today including myself, Dr.'s Ange Rowley, Ghislaine Bautista from Pediatric Dermatology, Dr. Roseline Marcial from Pediatric Interventional Radiology and Dr. Jose Luis Matamoros from Plastic Surgery, all of whom reviewed the case and imaging today.      At this time, our recommendations are as follows:   1.  Recommend committed trial of compression garment.  I explained that it may take months for her to deem benefit from this sleeve, but it should overall allow her to complete more activities with this arm and hopefully result in less painful swelling over time.  She is agreeing to do a trial of this.   2.  We will obtain D-dimer, fibrinogen, CBC, PT and PTT, as well as prothrombin fragment today to assess for any localized intervascular coagulation.  Could consider short trials of aspirin during phlebolith formation in the future, although if they pursue sclerotherapy, we would likely postpone this treatment until after sclerotherapy is complete.   3.  Dr. Marcial talked at length with her about the potential benefits of local sclerotherapy.  She would likely require initially about 5 treatments   by a month apart.  This could be done in conjunction with wearing of a compression garment.   4.  We recommend against surgical therapy at this time, since the surgery would need to be quite extensive and would not be curative.   5.  We have referred her to Dr. Linda Pritchard to discuss whether or not alternative oral contraceptive or other contraceptive options would be warranted here, since I am suspicious that her combined pill is likely contributing to some of the symptoms she is experiencing in her lesion.      Follow up in this office will be determined by the treatment course that is pursued.     Elaine Coon MD  , Pediatric Dermatology    Addendum:  Results for orders placed or performed in visit on 04/05/17   D dimer quantitative   Result Value Ref Range    D Dimer 0.4 0.0 - 0.50 ug/ml FEU   Partial thromboplastin time   Result Value Ref Range    PTT 28 22 - 37 sec   Prothrombin fragment   Result Value Ref Range    Prothrombin Fragment 0.10 <0.35 nmol/L   INR   Result Value Ref Range    INR 0.90 0.86 - 1.14   CBC with platelets differential   Result Value Ref Range    WBC 4.9 4.0 - 11.0 10e9/L    RBC Count 5.08 3.8 - 5.2 10e12/L    Hemoglobin 15.3 11.7 - 15.7 g/dL    Hematocrit 44.7 35.0 - 47.0 %    MCV 88 78 - 100 fl    MCH 30.1 26.5 - 33.0 pg    MCHC 34.2 31.5 - 36.5 g/dL    RDW 13.1 10.0 - 15.0 %    Platelet Count 198 150 - 450 10e9/L    Diff Method Automated Method     % Neutrophils 55.1 %    % Lymphocytes 29.5 %    % Monocytes 13.2 %    % Eosinophils 1.6 %    % Basophils 0.2 %    % Immature Granulocytes 0.4 %    Nucleated RBCs 0 0 /100    Absolute Neutrophil 2.7 1.6 - 8.3 10e9/L    Absolute Lymphocytes 1.5 0.8 - 5.3 10e9/L    Absolute Monocytes 0.7 0.0 - 1.3 10e9/L    Absolute Eosinophils 0.1 0.0 - 0.7 10e9/L    Absolute Basophils 0.0 0.0 - 0.2 10e9/L    Abs Immature Granulocytes 0.0 0 - 0.4 10e9/L    Absolute Nucleated RBC 0.0    Fibrinogen activity   Result Value Ref  Range    Fibrinogen 401 200 - 420 mg/dL     Patient informed that results are normal/reassuring.     CC: Deborah Carson  [facility] [address] [facility] [address]    CC: Maritza Gallardo (patient)    CC: Roseline Marcial: Interventional Radiology    CC: Linda Pritchard: OB/GYN

## 2017-04-06 ENCOUNTER — TELEPHONE (OUTPATIENT)
Dept: INTERVENTIONAL RADIOLOGY/VASCULAR | Facility: CLINIC | Age: 22
End: 2017-04-06

## 2017-04-06 NOTE — TELEPHONE ENCOUNTER
I called and was unable to leave voicemail on Maritza's cell, mailbox was full.  I called and spoke with Grandmother Ileana.  She states Maritza is starting a new job soon, but is interested in pursuing treatment (sclerotherapy).  She states she will be seeing Maritza tomorrow and they will call me tomorrow with more details and decisions.  GERMAINE Archer RN, BSN  Interventional Radiology Care Coordinator   Phone:  [phone]

## 2017-04-07 ENCOUNTER — TELEPHONE (OUTPATIENT)
Dept: INTERVENTIONAL RADIOLOGY/VASCULAR | Facility: CLINIC | Age: 22
End: 2017-04-07

## 2017-04-07 DIAGNOSIS — Q27.9 VENOUS MALFORMATION: Primary | ICD-10-CM

## 2017-04-07 NOTE — LETTER
April 7, 2017    Maritza Gallardo  [address] [address] [facility] [address]    Dear Maritza,     Monday, April 17th @ 11:30 am, please check in at 9:45 am to the Peds info desk at [facility], [address] [place] [address], [place] [address].    -Nothing to eat or drink for 8 hours prior.  You may have sips of clear liquid up to 2 hours prior to procedure.  -You will need a ride as you will be receiving sedation.  - Plan on being there up to 6 hours that day.    Sclerotherapy is a non-surgical procedure that uses ultrasound guidance to treat abnormal vessels (vascular malformations). This procedure can be used on abnormal vessels in many parts of the body. While you are under sedation, Dr. Marcial will inject a chemical (sclerosant) into the abnormal vessels that causes then to clot and become inflamed and irritated. This process causes pain and swelling in the treatment area, but the intent is the treated vessels will no longer fill with blood/fluid and scar down causing shrinkage in the vascular malformation and symptom improvement. This is rarely curative, but does improve symptoms. Lesions may require multiple treatments to achieve good symptom control. After the treatment, you need to be prepared to rest (no vigorous activity x 5 days). You will also have to keep a compression dressing applied to the site. Typically, pain is worst from day 1 to day 5, then gradually improves. Pain is typically well controlled with Ibuprofen only, but additional pain medications can be provided if needed.     Please call if you have any questions.    Thanks,   GERMAINE Archer RN, BSN  Interventional Radiology Care Coordinator   Phone:  [phone]

## 2017-04-07 NOTE — TELEPHONE ENCOUNTER
I called and spoke with Grandmother Olena and Maritza.  Maritza would like to pursue sclerotherapy treatment with Dr. Marcial.  She is changing jobs soon and would like to get on schedule as soon as possible.  I have her scheduled for Monday, April 17th at 11:30 am, with a 9:45 am check in to peds info desk at [facility]. NPO 8 hours prior. I will send a letter confirming appt details.  GERMAINE Archer RN, BSN  Interventional Radiology Care Coordinator   Phone:  [phone]

## 2017-04-10 LAB — PRO FRG1+2 SERPL-SCNC: 0.1 NMOL/L

## 2017-04-13 RX ORDER — FLUTICASONE PROPIONATE 50 MCG
1 SPRAY, SUSPENSION (ML) NASAL DAILY PRN
COMMUNITY
End: 2017-09-07

## 2017-04-16 ENCOUNTER — ANESTHESIA EVENT (OUTPATIENT)
Dept: SURGERY | Facility: CLINIC | Age: 22
End: 2017-04-16
Payer: COMMERCIAL

## 2017-04-16 ASSESSMENT — ENCOUNTER SYMPTOMS: SEIZURES: 0

## 2017-04-17 ENCOUNTER — HOSPITAL ENCOUNTER (OUTPATIENT)
Dept: INTERVENTIONAL RADIOLOGY/VASCULAR | Facility: CLINIC | Age: 22
End: 2017-04-17
Attending: RADIOLOGY | Admitting: RADIOLOGY
Payer: COMMERCIAL

## 2017-04-17 ENCOUNTER — HOSPITAL ENCOUNTER (OUTPATIENT)
Facility: CLINIC | Age: 22
Discharge: HOME | End: 2017-04-17
Attending: RADIOLOGY | Admitting: RADIOLOGY
Payer: COMMERCIAL

## 2017-04-17 ENCOUNTER — TELEPHONE (OUTPATIENT)
Dept: INTERVENTIONAL RADIOLOGY/VASCULAR | Facility: CLINIC | Age: 22
End: 2017-04-17

## 2017-04-17 ENCOUNTER — SURGERY (OUTPATIENT)
Age: 22
End: 2017-04-17

## 2017-04-17 ENCOUNTER — ANESTHESIA (OUTPATIENT)
Dept: SURGERY | Facility: CLINIC | Age: 22
End: 2017-04-17
Payer: COMMERCIAL

## 2017-04-17 VITALS
SYSTOLIC BLOOD PRESSURE: 108 MMHG | WEIGHT: 222.22 LBS | DIASTOLIC BLOOD PRESSURE: 66 MMHG | TEMPERATURE: 98.1 F | HEIGHT: 65 IN | RESPIRATION RATE: 16 BRPM | BODY MASS INDEX: 37.02 KG/M2 | OXYGEN SATURATION: 97 % | HEART RATE: 76 BPM

## 2017-04-17 DIAGNOSIS — Q27.9 VENOUS MALFORMATION: Primary | ICD-10-CM

## 2017-04-17 DIAGNOSIS — Q27.9 VENOUS MALFORMATION: ICD-10-CM

## 2017-04-17 LAB
ABO + RH BLD: NORMAL
ABO + RH BLD: NORMAL
BLD GP AB SCN SERPL QL: NORMAL
BLOOD BANK CMNT PATIENT-IMP: NORMAL
HCG UR QL: NEGATIVE
SPECIMEN EXP DATE BLD: NORMAL

## 2017-04-17 PROCEDURE — 25500064 ZZH RX 255 OP 636: Performed by: RADIOLOGY

## 2017-04-17 PROCEDURE — 27210740 ZZH ACCESSORY CR3

## 2017-04-17 PROCEDURE — 25000128 H RX IP 250 OP 636: Performed by: REGISTERED NURSE

## 2017-04-17 PROCEDURE — 25800025 ZZH RX 258: Performed by: REGISTERED NURSE

## 2017-04-17 PROCEDURE — 25000125 ZZHC RX 250: Performed by: REGISTERED NURSE

## 2017-04-17 PROCEDURE — 37000008 ZZH ANESTHESIA TECHNICAL FEE, 1ST 30 MIN

## 2017-04-17 PROCEDURE — 37000009 ZZH ANESTHESIA TECHNICAL FEE, EACH ADDTL 15 MIN

## 2017-04-17 PROCEDURE — 25000125 ZZHC RX 250: Performed by: ANESTHESIOLOGY

## 2017-04-17 PROCEDURE — 27210905 ZZH KIT CR7

## 2017-04-17 PROCEDURE — 86901 BLOOD TYPING SEROLOGIC RH(D): CPT | Performed by: ANESTHESIOLOGY

## 2017-04-17 PROCEDURE — 86850 RBC ANTIBODY SCREEN: CPT | Performed by: ANESTHESIOLOGY

## 2017-04-17 PROCEDURE — 25000132 ZZH RX MED GY IP 250 OP 250 PS 637: Performed by: ANESTHESIOLOGY

## 2017-04-17 PROCEDURE — 86900 BLOOD TYPING SEROLOGIC ABO: CPT | Performed by: ANESTHESIOLOGY

## 2017-04-17 PROCEDURE — 27210995 ZZH RX 272: Performed by: RADIOLOGY

## 2017-04-17 PROCEDURE — 25000128 H RX IP 250 OP 636: Performed by: RADIOLOGY

## 2017-04-17 PROCEDURE — 40000170 ZZH STATISTIC PRE-PROCEDURE ASSESSMENT II

## 2017-04-17 PROCEDURE — 71000014 ZZH RECOVERY PHASE 1 LEVEL 2 FIRST HR

## 2017-04-17 PROCEDURE — 25000132 ZZH RX MED GY IP 250 OP 250 PS 637: Performed by: RADIOLOGY

## 2017-04-17 PROCEDURE — 71000027 ZZH RECOVERY PHASE 2 EACH 15 MINS

## 2017-04-17 PROCEDURE — 25000125 ZZHC RX 250: Performed by: RADIOLOGY

## 2017-04-17 PROCEDURE — 36468 NJX SCLRSNT SPIDER VEINS: CPT

## 2017-04-17 PROCEDURE — 81025 URINE PREGNANCY TEST: CPT | Performed by: ANESTHESIOLOGY

## 2017-04-17 RX ORDER — LIDOCAINE HYDROCHLORIDE 20 MG/ML
INJECTION, SOLUTION INFILTRATION; PERINEURAL PRN
Status: DISCONTINUED | OUTPATIENT
Start: 2017-04-17 | End: 2017-04-17

## 2017-04-17 RX ORDER — OXYCODONE HYDROCHLORIDE 5 MG/1
5-10 TABLET ORAL EVERY 6 HOURS PRN
Qty: 20 TABLET | Refills: 0 | Status: SHIPPED | OUTPATIENT
Start: 2017-04-17 | End: 2017-09-07

## 2017-04-17 RX ORDER — LIDOCAINE 40 MG/G
CREAM TOPICAL
Status: DISCONTINUED | OUTPATIENT
Start: 2017-04-17 | End: 2017-04-17 | Stop reason: HOSPADM

## 2017-04-17 RX ORDER — OXYCODONE HYDROCHLORIDE 10 MG/1
0.1 TABLET ORAL EVERY 4 HOURS PRN
Status: DISCONTINUED | OUTPATIENT
Start: 2017-04-17 | End: 2017-04-17 | Stop reason: HOSPADM

## 2017-04-17 RX ORDER — SODIUM CHLORIDE, SODIUM LACTATE, POTASSIUM CHLORIDE, CALCIUM CHLORIDE 600; 310; 30; 20 MG/100ML; MG/100ML; MG/100ML; MG/100ML
INJECTION, SOLUTION INTRAVENOUS CONTINUOUS PRN
Status: DISCONTINUED | OUTPATIENT
Start: 2017-04-17 | End: 2017-04-17

## 2017-04-17 RX ORDER — KETOROLAC TROMETHAMINE 30 MG/ML
INJECTION, SOLUTION INTRAMUSCULAR; INTRAVENOUS PRN
Status: DISCONTINUED | OUTPATIENT
Start: 2017-04-17 | End: 2017-04-17

## 2017-04-17 RX ORDER — PROPOFOL 10 MG/ML
INJECTION, EMULSION INTRAVENOUS CONTINUOUS PRN
Status: DISCONTINUED | OUTPATIENT
Start: 2017-04-17 | End: 2017-04-17

## 2017-04-17 RX ORDER — ACETAMINOPHEN 325 MG/1
6.45 TABLET ORAL
Status: DISCONTINUED | OUTPATIENT
Start: 2017-04-17 | End: 2017-04-17 | Stop reason: HOSPADM

## 2017-04-17 RX ORDER — ACETAMINOPHEN 500 MG
1000 TABLET ORAL ONCE
Status: COMPLETED | OUTPATIENT
Start: 2017-04-17 | End: 2017-04-17

## 2017-04-17 RX ORDER — IOPAMIDOL 612 MG/ML
15 INJECTION, SOLUTION INTRATHECAL ONCE
Status: COMPLETED | OUTPATIENT
Start: 2017-04-17 | End: 2017-04-17

## 2017-04-17 RX ORDER — FENTANYL CITRATE 50 UG/ML
INJECTION, SOLUTION INTRAMUSCULAR; INTRAVENOUS PRN
Status: DISCONTINUED | OUTPATIENT
Start: 2017-04-17 | End: 2017-04-17

## 2017-04-17 RX ORDER — OXYCODONE HYDROCHLORIDE 5 MG/1
5-10 TABLET ORAL EVERY 4 HOURS PRN
Status: DISCONTINUED | OUTPATIENT
Start: 2017-04-17 | End: 2017-04-17 | Stop reason: HOSPADM

## 2017-04-17 RX ORDER — CEFAZOLIN SODIUM 1 G/3ML
9.88 INJECTION, POWDER, FOR SOLUTION INTRAMUSCULAR; INTRAVENOUS ONCE
Status: COMPLETED | OUTPATIENT
Start: 2017-04-17 | End: 2017-04-17

## 2017-04-17 RX ORDER — PROPOFOL 10 MG/ML
INJECTION, EMULSION INTRAVENOUS PRN
Status: DISCONTINUED | OUTPATIENT
Start: 2017-04-17 | End: 2017-04-17

## 2017-04-17 RX ORDER — ONDANSETRON 2 MG/ML
INJECTION INTRAMUSCULAR; INTRAVENOUS PRN
Status: DISCONTINUED | OUTPATIENT
Start: 2017-04-17 | End: 2017-04-17

## 2017-04-17 RX ORDER — FENTANYL CITRATE 50 UG/ML
25 INJECTION, SOLUTION INTRAMUSCULAR; INTRAVENOUS EVERY 10 MIN PRN
Status: DISCONTINUED | OUTPATIENT
Start: 2017-04-17 | End: 2017-04-17 | Stop reason: HOSPADM

## 2017-04-17 RX ADMIN — ACETAMINOPHEN 1000 MG: 500 TABLET ORAL at 10:28

## 2017-04-17 RX ADMIN — FENTANYL CITRATE 25 MCG: 50 INJECTION, SOLUTION INTRAMUSCULAR; INTRAVENOUS at 13:34

## 2017-04-17 RX ADMIN — ONDANSETRON 4 MG: 2 INJECTION INTRAMUSCULAR; INTRAVENOUS at 12:42

## 2017-04-17 RX ADMIN — LIDOCAINE HYDROCHLORIDE 50 MG: 20 INJECTION, SOLUTION INFILTRATION; PERINEURAL at 11:13

## 2017-04-17 RX ADMIN — FENTANYL CITRATE 25 MCG: 50 INJECTION, SOLUTION INTRAMUSCULAR; INTRAVENOUS at 11:33

## 2017-04-17 RX ADMIN — KETOROLAC TROMETHAMINE 30 MG: 30 INJECTION, SOLUTION INTRAMUSCULAR at 12:28

## 2017-04-17 RX ADMIN — PROPOFOL 100 MCG/KG/MIN: 10 INJECTION, EMULSION INTRAVENOUS at 11:12

## 2017-04-17 RX ADMIN — TETRADECYL HYDROGEN SULFATE (ESTER) 210 MG: 10 INJECTION, SOLUTION INTRAVENOUS at 12:54

## 2017-04-17 RX ADMIN — PROPOFOL 80 MG: 10 INJECTION, EMULSION INTRAVENOUS at 11:37

## 2017-04-17 RX ADMIN — PROPOFOL 20 MG: 10 INJECTION, EMULSION INTRAVENOUS at 12:27

## 2017-04-17 RX ADMIN — PROPOFOL 20 MG: 10 INJECTION, EMULSION INTRAVENOUS at 11:22

## 2017-04-17 RX ADMIN — PROPOFOL: 10 INJECTION, EMULSION INTRAVENOUS at 12:14

## 2017-04-17 RX ADMIN — PROPOFOL 30 MG: 10 INJECTION, EMULSION INTRAVENOUS at 12:57

## 2017-04-17 RX ADMIN — CEFAZOLIN 1 G: 1 INJECTION, POWDER, FOR SOLUTION INTRAMUSCULAR; INTRAVENOUS at 11:44

## 2017-04-17 RX ADMIN — OXYCODONE HYDROCHLORIDE 5 MG: 5 TABLET ORAL at 14:10

## 2017-04-17 RX ADMIN — FENTANYL CITRATE 25 MCG: 50 INJECTION, SOLUTION INTRAMUSCULAR; INTRAVENOUS at 12:27

## 2017-04-17 RX ADMIN — PROPOFOL 50 MG: 10 INJECTION, EMULSION INTRAVENOUS at 11:17

## 2017-04-17 RX ADMIN — IOPAMIDOL 7 ML: 612 INJECTION, SOLUTION INTRATHECAL at 12:53

## 2017-04-17 RX ADMIN — FENTANYL CITRATE 50 MCG: 50 INJECTION, SOLUTION INTRAMUSCULAR; INTRAVENOUS at 13:15

## 2017-04-17 RX ADMIN — FENTANYL CITRATE 25 MCG: 50 INJECTION, SOLUTION INTRAMUSCULAR; INTRAVENOUS at 12:10

## 2017-04-17 RX ADMIN — SODIUM CHLORIDE, POTASSIUM CHLORIDE, SODIUM LACTATE AND CALCIUM CHLORIDE: 600; 310; 30; 20 INJECTION, SOLUTION INTRAVENOUS at 11:07

## 2017-04-17 RX ADMIN — PROPOFOL 50 MG: 10 INJECTION, EMULSION INTRAVENOUS at 11:12

## 2017-04-17 RX ADMIN — MIDAZOLAM HYDROCHLORIDE 2 MG: 1 INJECTION, SOLUTION INTRAMUSCULAR; INTRAVENOUS at 11:00

## 2017-04-17 RX ADMIN — LIDOCAINE HYDROCHLORIDE 50 MG: 20 INJECTION, SOLUTION INFILTRATION; PERINEURAL at 11:15

## 2017-04-17 RX ADMIN — LIDOCAINE HYDROCHLORIDE 2 ML: 10 INJECTION, SOLUTION INFILTRATION; PERINEURAL at 12:52

## 2017-04-17 ASSESSMENT — ENCOUNTER SYMPTOMS: STRIDOR: 0

## 2017-04-17 NOTE — IP AVS SNAPSHOT
[facility]    [address] [facility] [address]    Phone:  [phone]                                       After Visit Summary   4/17/2017    Maritza Gallardo    MRN: [id number]           After Visit Summary Signature Page     I have received my discharge instructions, and my questions have been answered. I have discussed any challenges I see with this plan with the nurse or doctor.    ..........................................................................................................................................  Patient/Patient Representative Signature      ..........................................................................................................................................  Patient Representative Print Name and Relationship to Patient    ..................................................               ................................................  Date                                            Time    ..........................................................................................................................................  Reviewed by Signature/Title    ...................................................              ..............................................  Date                                                            Time

## 2017-04-17 NOTE — DISCHARGE INSTRUCTIONS
Same-Day Surgery   Adult Discharge Orders & Instructions     For 24 hours after surgery:  1. Get plenty of rest.  A responsible adult must stay with you for at least 24 hours after you leave the hospital.   2. Pain medication can slow your reflexes. Do not drive or use heavy equipment.  If you have weakness or tingling, don't drive or use heavy equipment until this feeling goes away.  3. Mixing alcohol and pain medication can cause dizziness and slow your breathing. It can even be fatal. Do not drink alcohol while taking pain medication.  4. Avoid strenuous or risky activities.  Ask for help when climbing stairs.   5. You may feel lightheaded.  If so, sit for a few minutes before standing.  Have someone help you get up.   6. If you have nausea (feel sick to your stomach), drink only clear liquids such as apple juice, ginger ale, broth or 7-Up.  Rest may also help.  Be sure to drink enough fluids.  Move to a regular diet as you feel able. Take pain medications with a small amount of solid food, such as toast or crackers, to avoid nausea.   7. A slight fever is normal. Call the doctor if your fever is over 100 F (37.7 C) (taken under the tongue) or lasts longer than 24 hours.  8. You may have a dry mouth, muscle aches, trouble sleeping or a sore throat.  These symptoms should go away after 24 hours.  9. Do not make important or legal decisions.   Pain Management:      1. Take pain medication (if prescribed) for pain as directed by your physician.        2. WARNING: If the pain medication you have been prescribed contains Tylenol  (acetaminophen), DO NOT take additional doses of Tylenol (acetaminophen).     Call your doctor for any of the followin.  Signs of infection (fever, growing tenderness at the surgery site, severe pain, a large amount of drainage or bleeding, foul-smelling drainage, redness, swelling).    2.  It has been over 8 to 10 hours since surgery and you are still not able to urinate (pee).    3.   Headache for over 24 hours.    4.  Numbness, tingling or weakness the day after surgery (if you had spinal anesthesia).  To contact a doctor, call _____________________________________ or:      [phone] and ask for the Resident On Call for:          __________________________________________ (answered 24 hours a day)      Emergency Department:  [place] [facility]: [phone]  [place] [facility]: [phone]               Rev. 10/2014

## 2017-04-17 NOTE — ANESTHESIA PREPROCEDURE EVALUATION
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    Otherwise, she  has a past medical history of Gastroesophageal reflux disease and Venous malformation. she  has a past surgical history that includes Sedated MRI.    Anesthesia Evaluation     . Pt has had prior anesthetic. Type: MAC    No history of anesthetic complications          ROS/MED HX    ENT/Pulmonary:  - neg pulmonary ROS   (+)sleep apnea (mild per patient, had PSG, not on file), RICKY risk factors snores loudly, obese, daytime somnolence, doesn't use CPAP , . .   (-) asthma   Neurologic:  - neg neurologic ROS    (-) seizures and Developmental delay   Cardiovascular:  - neg cardiovascular ROS      (-) CHF and congenital heart disease   METS/Exercise Tolerance:  >4 METS   Hematologic: Comments: - venous malformation of RUE and chest       (-) history of blood clots   Musculoskeletal:  - neg musculoskeletal ROS       GI/Hepatic:     (+) GERD      (-) liver disease   Renal/Genitourinary:  - ROS Renal section negative    (-) renal disease   Endo:     (+) Obesity, .   (-) chronic steroid usage   Psychiatric:  - neg psychiatric ROS       Infectious Disease:  - neg infectious disease ROS       Malignancy:      - no malignancy   Other:    (+) C-spine cleared: N/A, H/O Chronic Pain,H/O chronic opiod use , no other significant disability                    Physical Exam  Normal systems: pulmonary    Airway   Mallampati: II  TM distance: >3 FB  Neck ROM: full    Dental   Comment: - white spot on right upper frontal tooth, lower edge    Cardiovascular   Rhythm and rate: regular and normal  (-) no murmur    Pulmonary    breath sounds clear to auscultation(-) no rhonchi, no wheezes and no stridor                PCP: Deborah Carson    Lab Results   Component Value Date    WBC 4.9 04/05/2017    HGB 15.3 04/05/2017    HCT 44.7 04/05/2017     04/05/2017    PTT 28  "04/05/2017    INR 0.90 04/05/2017    FIBR 401 04/05/2017    HCG Negative 04/17/2017         Preop Vitals  BP Readings from Last 3 Encounters:   04/17/17 113/76   04/05/17 109/66    Pulse Readings from Last 3 Encounters:   04/17/17 76   04/05/17 72      Resp Readings from Last 3 Encounters:   04/17/17 18    SpO2 Readings from Last 3 Encounters:   04/17/17 98%      Temp Readings from Last 3 Encounters:   04/17/17 37  C (98.6  F) (Oral)    Ht Readings from Last 3 Encounters:   04/17/17 1.651 m (5' 5\")   04/05/17 1.641 m (5' 4.61\")      Wt Readings from Last 3 Encounters:   04/17/17 100.8 kg (222 lb 3.6 oz)   04/05/17 101.2 kg (223 lb 1.7 oz)    Estimated body mass index is 36.98 kg/(m^2) as calculated from the following:    Height as of this encounter: 1.651 m (5' 5\").    Weight as of this encounter: 100.8 kg (222 lb 3.6 oz).     Current Medications  No current outpatient prescriptions on file.       LDA         Anesthesia Plan      History & Physical Review  History and physical reviewed and following examination; no interval change.    ASA Status:  2 .    NPO Status:  > 6 hours    Plan for General (NC, ETT as backup) with Intravenous induction. Maintenance will be TIVA.    PONV prophylaxis:  Ondansetron (or other 5HT-3) and Dexamethasone or Solumedrol  Additional equipment: 2nd IV Consented Person: Patient  Consented via: Direct conversation    Discussed common and potentially harmful risks for General Anesthesia with Natural Airway.   These risks include, but were not limited to: Sore throat, Airway injury, Dental injury, Aspiration, Respiratory issues (Bronchospasm, Laryngospasm, Desaturation), Hemodynamic issues (Arrhythmia, Hypotension, Ischemia), Potential long term consequences of respiratory and hemodynamic issues, PONV, Emergence delirium  Risks of invasive procedures were not discussed: N/A    All questions were answered.        Postoperative Care  Postoperative pain management:  Multi-modal analgesia.  "     Consents  Anesthetic plan, risks, benefits and alternatives discussed with:  Patient.  Use of blood products discussed: No .   .          True Mitchell, 4/17/2017, 10:04 AM

## 2017-04-17 NOTE — BRIEF OP NOTE
Interventional Radiology Brief Post Procedure Note    Procedure: Sclerotherapy venous malformation left arm    Proceduralist: Roseline Marcial MD    Assistant: None    Time Out: Prior to the start of the procedure and with procedural staff participation, I verbally confirmed the patient s identity using two indicators, relevant allergies, that the procedure was appropriate and matched the consent or emergent situation, and that the correct equipment/implants were available. Immediately prior to starting the procedure I conducted the Time Out with the procedural staff and re-confirmed the patient s name, procedure, and site/side. (The Joint Commission universal protocol was followed.)  Yes    Sedation: Monitored Anesthesia Care (MAC) administered and documented by Anesthesia Care Provider    Findings:   Sclerotherapy venous malformation left arm-- 6 mL Sotradecol    Estimated Blood Loss: Minimal    Fluoroscopy Time: Less than 5 minutes    SPECIMENS: None    Complications: 1. None     Condition: Stable    Plan:   1. Compression garment to left arm all day/night x next 5 days as able. May remove to bathe  2. Pain control with Ibuprofen and roxicodone  3. Cold packs x next 3 days for comfort  4. No vigorous activity x next 5 days  5. Return on 3-4 weeks for additional sclerotherapy    Comments: See dictated procedure note for full details.    Roseline Marcial MD

## 2017-04-17 NOTE — IP AVS SNAPSHOT
MRN:[id number]                      After Visit Summary   4/17/2017    Maritza Gallardo    MRN: [id number]           Thank you!     Thank you for choosing [facility] for your care. Our goal is always to provide you with excellent care. Hearing back from our patients is one way we can continue to improve our services. Please take a few minutes to complete the written survey that you may receive in the mail after you visit with us. Thank you!        Patient Information     Date Of Birth          1995        About your hospital stay     You were admitted on:  April 17, 2017 You last received care in the:   PACU    You were discharged on:  April 17, 2017       Who to Call     For medical emergencies, please call 911.  For non-urgent questions about your medical care, please call your primary care provider or clinic, [phone]  For questions related to your surgery, please call your surgery clinic        Attending Provider     Provider Specialty    Roseline Marcial MD Radiology       Primary Care Provider Office Phone # Fax #    Deborah Yue Carson [phone]       [place] [address]        After Care Instructions     Discharge Instructions       No submerging procedure site under water for 7 days.  Observe carefully for any blistering or skin ulceration.            Discharge Instructions       Keep skin clean and dry.            Discharge Instructions       Cold packs to be used for 3 days followed by warm packs.  Apply cold packs every 4 hours for 20 minutes.            Discharge Instructions       No strenuous activity for 5 days.            Discharge Instructions       Compression dressing to site at all times x next 5 days at home as able.  May remove for bathing.                  Further instructions from your care team       Same-Day Surgery   Adult Discharge Orders & Instructions     For 24 hours after surgery:  1. Get plenty of rest.   A responsible adult must stay with you for at least 24 hours after you leave the hospital.   2. Pain medication can slow your reflexes. Do not drive or use heavy equipment.  If you have weakness or tingling, don't drive or use heavy equipment until this feeling goes away.  3. Mixing alcohol and pain medication can cause dizziness and slow your breathing. It can even be fatal. Do not drink alcohol while taking pain medication.  4. Avoid strenuous or risky activities.  Ask for help when climbing stairs.   5. You may feel lightheaded.  If so, sit for a few minutes before standing.  Have someone help you get up.   6. If you have nausea (feel sick to your stomach), drink only clear liquids such as apple juice, ginger ale, broth or 7-Up.  Rest may also help.  Be sure to drink enough fluids.  Move to a regular diet as you feel able. Take pain medications with a small amount of solid food, such as toast or crackers, to avoid nausea.   7. A slight fever is normal. Call the doctor if your fever is over 100 F (37.7 C) (taken under the tongue) or lasts longer than 24 hours.  8. You may have a dry mouth, muscle aches, trouble sleeping or a sore throat.  These symptoms should go away after 24 hours.  9. Do not make important or legal decisions.   Pain Management:      1. Take pain medication (if prescribed) for pain as directed by your physician.        2. WARNING: If the pain medication you have been prescribed contains Tylenol  (acetaminophen), DO NOT take additional doses of Tylenol (acetaminophen).     Call your doctor for any of the followin.  Signs of infection (fever, growing tenderness at the surgery site, severe pain, a large amount of drainage or bleeding, foul-smelling drainage, redness, swelling).    2.  It has been over 8 to 10 hours since surgery and you are still not able to urinate (pee).    3.  Headache for over 24 hours.    4.  Numbness, tingling or weakness the day after surgery (if you had spinal  "anesthesia).  To contact a doctor, call _____________________________________ or:      [phone] and ask for the Resident On Call for:          __________________________________________ (answered 24 hours a day)      Emergency Department:  [facility]: [phone]  [facility]: [phone]               Rev. 10/2014       Pending Results     Date and Time Order Name Status Description    4/17/2017 1050 IR Vein Spider Non Face Sclerotherapy In process             Admission Information     Date & Time Provider Department Dept. Phone    4/17/2017 Roseline Marcial MD  PACU [phone]      Your Vitals Were     Blood Pressure Pulse Temperature Respirations Height Weight    109/66 76 98.1  F (36.7  C) (Oral) 16 1.651 m (5' 5\") 100.8 kg (222 lb 3.6 oz)    Last Period Pulse Oximetry BMI (Body Mass Index)             04/13/2017 97% 36.98 kg/m2         Care EveryWhere ID     This is your Care EveryWhere ID. This could be used by other organizations to access your [facility] medical records  [id number]           Review of your medicines      UNREVIEWED medicines. Ask your doctor about these medicines        Dose / Directions    Biotin 2500 MCG Caps   Used for:  Venous malformation        Refills:  0       calcium-vitamin D 600-400 MG-UNIT per tablet   Commonly known as:  CALTRATE        Dose:  1 tablet   Take 1 tablet by mouth 2 times daily   Refills:  0       fluticasone 50 MCG/ACT spray   Commonly known as:  FLONASE        Dose:  1 spray   Spray 1 spray into both nostrils daily as needed for rhinitis or allergies   Refills:  0       MULTIVITAMIN ADULT PO   Used for:  Venous malformation        Refills:  0       NIZORAL 2 % shampoo   Used for:  Venous malformation   Generic drug:  ketoconazole        Apply topically daily as needed for itching or irritation   Refills:  0       NONFORMULARY   Used for:  Venous malformation        Cranberry Extract   Refills:  0       " NORGESTIMATE-ETHINYL ESTRADIOL OR   Used for:  Venous malformation        Refills:  0       traMADol 50 MG tablet   Commonly known as:  ULTRAM   Used for:  Venous malformation        Take by mouth every 6 hours as needed for moderate pain   Refills:  0       triamcinolone 0.025 % cream   Commonly known as:  KENALOG   Used for:  Venous malformation        Apply topically 2 times daily   Refills:  0       ZYRTEC ALLERGY PO   Used for:  Venous malformation        Refills:  0         START taking        Dose / Directions    oxyCODONE 5 MG IR tablet   Commonly known as:  ROXICODONE   Used for:  Venous malformation        Dose:  5-10 mg   Take 1-2 tablets (5-10 mg) by mouth every 6 hours as needed for moderate to severe pain   Quantity:  20 tablet   Refills:  0            Where to get your medicines      Some of these will need a paper prescription and others can be bought over the counter. Ask your nurse if you have questions.     Bring a paper prescription for each of these medications     oxyCODONE 5 MG IR tablet                Protect others around you: Learn how to safely use, store and throw away your medicines at www.disposemymeds.org.             Medication List: This is a list of all your medications and when to take them. Check marks below indicate your daily home schedule. Keep this list as a reference.      Medications           Morning Afternoon Evening Bedtime As Needed    Biotin 2500 MCG Caps                                calcium-vitamin D 600-400 MG-UNIT per tablet   Commonly known as:  CALTRATE   Take 1 tablet by mouth 2 times daily                                fluticasone 50 MCG/ACT spray   Commonly known as:  FLONASE   Spray 1 spray into both nostrils daily as needed for rhinitis or allergies                                MULTIVITAMIN ADULT PO                                NIZORAL 2 % shampoo   Apply topically daily as needed for itching or irritation   Generic drug:  ketoconazole                                 NONFORMULARY   Cranberry Extract                                NORGESTIMATE-ETHINYL ESTRADIOL OR                                oxyCODONE 5 MG IR tablet   Commonly known as:  ROXICODONE   Take 1-2 tablets (5-10 mg) by mouth every 6 hours as needed for moderate to severe pain   Last time this was given:  5 mg on 4/17/2017  2:10 PM                                traMADol 50 MG tablet   Commonly known as:  ULTRAM   Take by mouth every 6 hours as needed for moderate pain                                triamcinolone 0.025 % cream   Commonly known as:  KENALOG   Apply topically 2 times daily                                ZYRTEC ALLERGY PO

## 2017-04-17 NOTE — ANESTHESIA POSTPROCEDURE EVALUATION
Patient: Maritza Gallardo    Procedure(s):  Sclerotherapy in IR @ 1130    Diagnosis:Venous Malformation Right Arm and Chest  Diagnosis Additional Information: No value filed.    Anesthesia Type:  General    Note:  Anesthesia Post Evaluation    Patient location during evaluation: Peds Sedation  Patient participation: Able to fully participate in evaluation  Level of consciousness: awake  Pain management: adequate  Airway patency: patent  Cardiovascular status: acceptable  Respiratory status: acceptable  Hydration status: acceptable  PONV: none     Anesthetic complications: None          Last vitals:  Vitals:    04/17/17 1315 04/17/17 1330 04/17/17 1404   BP: 112/69 110/64 109/66   Pulse:      Resp: 16 16 16   Temp:   36.7  C (98.1  F)   SpO2: 100% 97% 97%         Electronically Signed By: Vianey Calvillo MD  April 17, 2017  3:02 PM

## 2017-04-17 NOTE — ANESTHESIA CARE TRANSFER NOTE
Patient: Maritza Gallardo    Procedure(s):  Sclerotherapy in IR @ 1130    Diagnosis: Venous Malformation Right Arm and Chest  Diagnosis Additional Information: No value filed.    Anesthesia Type:   General     Note:  Airway :Face Mask  Patient transferred to:PACU  Comments: VSS.  Spontaneous respirations.  Moving all extremities.  Drowsy but wakeful.  Satisfactory anesthetic recovery.      Vitals: (Last set prior to Anesthesia Care Transfer)    CRNA VITALS  4/17/2017 1239 - 4/17/2017 1320      4/17/2017             NIBP: 112/69    Pulse: 72    Temp: 36.6  C (97.9  F)    SpO2: 99 %    Resp Rate (observed): 22    EKG: Sinus rhythm                Electronically Signed By: MELODIE Price CRNA  April 17, 2017  1:20 PM

## 2017-04-18 ENCOUNTER — HOSPITAL ENCOUNTER (OUTPATIENT)
Facility: CLINIC | Age: 22
End: 2017-04-18
Attending: RADIOLOGY | Admitting: RADIOLOGY
Payer: COMMERCIAL

## 2017-04-18 NOTE — TELEPHONE ENCOUNTER
I called to see how Maritza was doing post sclerotherapy treatment by Dr. Marcial.  I left voicemail including my call back number.  GERMAINE Archer, RN, BSN  Interventional Radiology Care Coordinator   Phone:  [phone]

## 2017-04-18 NOTE — TELEPHONE ENCOUNTER
Pt returned my call, she is doing well. She is going to change the dressings today.  We discussed signs of infection and that there should not be any drainage.  She says she is a little sore but has not taken any ibuprofen since last evening.  She should continue compression and to take it easy for a week post procedure, not to use her arm in a strenuous way and to keep her HR down to promote treatment efficacy.  We discussed her next treatment date of Monday, May 8th, she says any day is fine to schedule.  I will mail her a letter to her grandmothers address once finalized.  She confirms this and all her questions were answered.  GERMAINE Archer RN, BSN  Interventional Radiology Care Coordinator   Phone:  [phone]

## 2017-04-28 ENCOUNTER — TELEPHONE (OUTPATIENT)
Dept: INTERVENTIONAL RADIOLOGY/VASCULAR | Facility: CLINIC | Age: 22
End: 2017-04-28

## 2017-04-28 NOTE — TELEPHONE ENCOUNTER
Patient called requesting date change for sclerotherapy treatment with Dr. Marcial.  She is rescheduled for Tuesday, May 9th @ 11 am with a 930 am check in to the [facility] waiting room [facility]>  A letter was sent with appt details and a map, pt confirms this.  GERMAINE Archer RN, BSN  Interventional Radiology Care Coordinator   Phone:  [phone]

## 2017-04-28 NOTE — LETTER
April 28, 2017    Maritza Gallardo  [address] [facility] [address]    Dear Maritza,     Procedure Reminder:    I have scheduled you for your Sclerotherapy on Tuesday, May 9th @ 11 am, please check in at 9:30 am.    Please check into the [facility] Waiting room, located on the main level, at [facility], located at [address] [place] [address], [place] [address]    Reminders:    -No solids or milk products 6 hours prior to appointment time.    -No clear liquids 2 hours prior to appointment time.     -Please take your morning medications as indicated with water    Please feel free to call me with any questions or concerns.    Audra Archer, RN, BSN  Interventional Radiology Care Coordinator  Office: [phone]

## 2017-05-03 ENCOUNTER — TELEPHONE (OUTPATIENT)
Dept: INTERVENTIONAL RADIOLOGY/VASCULAR | Facility: CLINIC | Age: 22
End: 2017-05-03

## 2017-05-09 ENCOUNTER — HOSPITAL ENCOUNTER (OUTPATIENT)
Facility: CLINIC | Age: 22
Discharge: HOME | End: 2017-05-09
Attending: RADIOLOGY | Admitting: RADIOLOGY
Payer: COMMERCIAL

## 2017-05-09 ENCOUNTER — APPOINTMENT (OUTPATIENT)
Dept: INTERVENTIONAL RADIOLOGY/VASCULAR | Facility: CLINIC | Age: 22
End: 2017-05-09
Attending: RADIOLOGY
Payer: COMMERCIAL

## 2017-05-09 ENCOUNTER — APPOINTMENT (OUTPATIENT)
Dept: MEDSURG UNIT | Facility: CLINIC | Age: 22
End: 2017-05-09
Attending: RADIOLOGY
Payer: COMMERCIAL

## 2017-05-09 VITALS
DIASTOLIC BLOOD PRESSURE: 74 MMHG | TEMPERATURE: 96 F | HEART RATE: 74 BPM | HEIGHT: 65 IN | SYSTOLIC BLOOD PRESSURE: 116 MMHG | WEIGHT: 220 LBS | BODY MASS INDEX: 36.65 KG/M2 | RESPIRATION RATE: 16 BRPM | OXYGEN SATURATION: 98 %

## 2017-05-09 DIAGNOSIS — Q27.9 VENOUS MALFORMATION: ICD-10-CM

## 2017-05-09 LAB — HCG SERPL QL: NEGATIVE

## 2017-05-09 PROCEDURE — 25000128 H RX IP 250 OP 636: Performed by: RADIOLOGY

## 2017-05-09 PROCEDURE — 40000167 ZZH STATISTIC PP CARE STAGE 2

## 2017-05-09 PROCEDURE — 25000125 ZZHC RX 250: Performed by: RADIOLOGY

## 2017-05-09 PROCEDURE — 84703 CHORIONIC GONADOTROPIN ASSAY: CPT | Performed by: RADIOLOGY

## 2017-05-09 PROCEDURE — 27210903 ZZH KIT CR5

## 2017-05-09 PROCEDURE — 99152 MOD SED SAME PHYS/QHP 5/>YRS: CPT

## 2017-05-09 PROCEDURE — 99153 MOD SED SAME PHYS/QHP EA: CPT

## 2017-05-09 PROCEDURE — 25500064 ZZH RX 255 OP 636: Performed by: RADIOLOGY

## 2017-05-09 PROCEDURE — 25000132 ZZH RX MED GY IP 250 OP 250 PS 637: Performed by: RADIOLOGY

## 2017-05-09 PROCEDURE — 37241 VASC EMBOLIZE/OCCLUDE VENOUS: CPT

## 2017-05-09 PROCEDURE — 27210732 ZZH ACCESSORY CR1

## 2017-05-09 RX ORDER — LIDOCAINE 40 MG/G
CREAM TOPICAL
Status: DISCONTINUED | OUTPATIENT
Start: 2017-05-09 | End: 2017-05-09 | Stop reason: HOSPADM

## 2017-05-09 RX ORDER — FLUMAZENIL 0.1 MG/ML
0.2 INJECTION, SOLUTION INTRAVENOUS
Status: DISCONTINUED | OUTPATIENT
Start: 2017-05-09 | End: 2017-05-09 | Stop reason: HOSPADM

## 2017-05-09 RX ORDER — NALOXONE HYDROCHLORIDE 0.4 MG/ML
.1-.4 INJECTION, SOLUTION INTRAMUSCULAR; INTRAVENOUS; SUBCUTANEOUS
Status: DISCONTINUED | OUTPATIENT
Start: 2017-05-09 | End: 2017-05-09 | Stop reason: HOSPADM

## 2017-05-09 RX ORDER — FENTANYL CITRATE 50 UG/ML
25-50 INJECTION, SOLUTION INTRAMUSCULAR; INTRAVENOUS EVERY 5 MIN PRN
Status: DISCONTINUED | OUTPATIENT
Start: 2017-05-09 | End: 2017-05-09 | Stop reason: HOSPADM

## 2017-05-09 RX ORDER — SODIUM CHLORIDE 9 MG/ML
INJECTION, SOLUTION INTRAVENOUS CONTINUOUS
Status: DISCONTINUED | OUTPATIENT
Start: 2017-05-09 | End: 2017-05-09 | Stop reason: HOSPADM

## 2017-05-09 RX ORDER — CEFAZOLIN SODIUM 2 G/100ML
2 INJECTION, SOLUTION INTRAVENOUS
Status: COMPLETED | OUTPATIENT
Start: 2017-05-09 | End: 2017-05-09

## 2017-05-09 RX ORDER — IODIXANOL 320 MG/ML
50 INJECTION, SOLUTION INTRAVASCULAR ONCE
Status: COMPLETED | OUTPATIENT
Start: 2017-05-09 | End: 2017-05-09

## 2017-05-09 RX ORDER — SODIUM TETRADECYL SULFATE 30 MG/ML
2 INJECTION, SOLUTION INTRAVENOUS ONCE
Status: COMPLETED | OUTPATIENT
Start: 2017-05-09 | End: 2017-05-09

## 2017-05-09 RX ORDER — OXYCODONE HYDROCHLORIDE 5 MG/1
5 TABLET ORAL ONCE
Status: COMPLETED | OUTPATIENT
Start: 2017-05-09 | End: 2017-05-09

## 2017-05-09 RX ORDER — DIPHENHYDRAMINE HYDROCHLORIDE 50 MG/ML
50 INJECTION INTRAMUSCULAR; INTRAVENOUS ONCE
Status: COMPLETED | OUTPATIENT
Start: 2017-05-09 | End: 2017-05-09

## 2017-05-09 RX ORDER — KETOROLAC TROMETHAMINE 30 MG/ML
30 INJECTION, SOLUTION INTRAMUSCULAR; INTRAVENOUS ONCE
Status: COMPLETED | OUTPATIENT
Start: 2017-05-09 | End: 2017-05-09

## 2017-05-09 RX ORDER — OXYCODONE HYDROCHLORIDE 5 MG/1
5-10 TABLET ORAL EVERY 4 HOURS PRN
Qty: 30 TABLET | Refills: 0 | Status: ON HOLD | OUTPATIENT
Start: 2017-05-09 | End: 2017-10-09

## 2017-05-09 RX ADMIN — TETRADECYL HYDROGEN SULFATE (ESTER) 120 MG: 30 INJECTION, SOLUTION INTRAVENOUS at 14:05

## 2017-05-09 RX ADMIN — KETOROLAC TROMETHAMINE 30 MG: 30 INJECTION, SOLUTION INTRAMUSCULAR at 13:50

## 2017-05-09 RX ADMIN — CEFAZOLIN SODIUM 2 G: 2 INJECTION, SOLUTION INTRAVENOUS at 10:48

## 2017-05-09 RX ADMIN — SODIUM CHLORIDE: 9 INJECTION, SOLUTION INTRAVENOUS at 10:48

## 2017-05-09 RX ADMIN — OXYCODONE HYDROCHLORIDE 5 MG: 5 TABLET ORAL at 16:05

## 2017-05-09 RX ADMIN — DIPHENHYDRAMINE HYDROCHLORIDE 50 MG: 50 INJECTION, SOLUTION INTRAMUSCULAR; INTRAVENOUS at 13:45

## 2017-05-09 RX ADMIN — FENTANYL CITRATE 250 MCG: 50 INJECTION INTRAMUSCULAR; INTRAVENOUS at 14:06

## 2017-05-09 RX ADMIN — MIDAZOLAM 4 MG: 1 INJECTION INTRAMUSCULAR; INTRAVENOUS at 14:06

## 2017-05-09 RX ADMIN — IODIXANOL 2 ML: 320 INJECTION, SOLUTION INTRAVASCULAR at 13:56

## 2017-05-09 NOTE — PROGRESS NOTES
Prep and teaching complete for sclerotherapy of Right Arm; Ling Valles, at bedside, [phone]. Awaiting consent and lab result. H and P is current.

## 2017-05-09 NOTE — BRIEF OP NOTE
Interventional Radiology Brief Post Procedure Note    Procedure: Sclerotherapy venous malformation left arm    Proceduralist: Roseline Marcial MD    Assistant: None    Time Out: Prior to the start of the procedure and with procedural staff participation, I verbally confirmed the patient s identity using two indicators, relevant allergies, that the procedure was appropriate and matched the consent or emergent situation, and that the correct equipment/implants were available. Immediately prior to starting the procedure I conducted the Time Out with the procedural staff and re-confirmed the patient s name, procedure, and site/side. (The Joint Commission universal protocol was followed.)  Yes    Sedation: IR Nurse Monitored Care   Post Procedure Summary:  Prior to the start of the procedure and with procedural staff participation, I verbally confirmed the patient s identity using two indicators, relevant allergies, that the procedure was appropriate and matched the consent or emergent situation, and that the correct equipment/implants were available. Immediately prior to starting the procedure I conducted the Time Out with the procedural staff and re-confirmed the patient s name, procedure, and site/side. (The Joint Commission universal protocol was followed.)  Yes       Sedatives: Fentanyl and Midazolam (Versed)    Vital signs, airway and pulse oximetry were monitored and remained stable throughout the procedure and sedation was maintained until the procedure was complete.  The patient was monitored by staff until sedation discharge criteria were met.    Patient tolerance: Patient tolerated the procedure well with no immediate complications.    Time of sedation in minutes: 35 minutes minutes from beginning to end of physician one to one monitoring.        Findings:     Findings:   Sclerotherapy venous malformation left arm-- 4 mL Sotradecol    Estimated Blood Loss: Minimal    Fluoroscopy Time: Less than 5  minutes    SPECIMENS: None    Complications: 1. None     Condition: Stable    Plan:   1. Compression garment to left arm all day/night x next 5 days as able. May remove to bathe  2. Pain control with Ibuprofen   3. Cold packs x next 3 days for comfort  4. No vigorous activity x next 5 days  5. Return on 3-4 weeks for additional sclerotherapy    Comments: See dictated procedure note for full details.    Roseline Marcial MD

## 2017-05-09 NOTE — PROGRESS NOTES
Interventional Radiology Intra-procedural Nursing Note    Patient Name: Maritza Gallardo  Medical Record Number: [id number]  Today's Date: May 9, 2017         Start Time: 1315  End of procedure time: 1350  Procedure: Sclerotherapy right arm vascular malformation.  Fire Safety Score: 2    Consent Review/Timeout Performed by: Dr. Roseline Marcial  Procedure Performed By: Dr. Roseline Marcial    Fentanyl administered: 250 mcg  Versed administered: 4 mg    Start of Sedation Time: 1315  End of Sedation Time: 1350  Total Sedation Time: 35 minutes    Report given to: Ileana MERA  Time pt departs:  1405    Other Notes:  Alert female transported via cart from  to IR Procedure Room 4 for planned intervention.  ID band confirmed and patient acknowledges understanding of planned procedure. Patient repositioned to procedure table via hover-mat and positioned supine.  Patient prepped and draped per policy see VS flowsheet, MAR for further information.     Right arm treated with 4 cc of 3% Sotradecol administered by provider.  Sites cleansed and dressed per protocol.    Patient condition post procedure is stable.   Patient returned to  for post-procedure monitoring.    Rosemary Cooper RN

## 2017-05-09 NOTE — IP AVS SNAPSHOT
[address] [address]                                       After Visit Summary   5/9/2017    Maritza Gallardo    MRN: [id number]           After Visit Summary Signature Page     I have received my discharge instructions, and my questions have been answered. I have discussed any challenges I see with this plan with the nurse or doctor.    ..........................................................................................................................................  Patient/Patient Representative Signature      ..........................................................................................................................................  Patient Representative Print Name and Relationship to Patient    ..................................................               ................................................  Date                                            Time    ..........................................................................................................................................  Reviewed by Signature/Title    ...................................................              ..............................................  Date                                                            Time

## 2017-05-09 NOTE — DISCHARGE INSTRUCTIONS
[facility]  Going Home after Sedation      For 24 hours:    An adult should stay with you.    Relax and take it easy.    DO NOT make any important legal decisions.    DO NOT drive or operate machines at home or at work.    Resume your regular diet and drink plenty of fluids.    CALL THE PHYSICIAN IF:    You develop nausea or vomiting    You develop hives or a rash or any unexplained itching    ADDITIONAL INSTRUCTIONS:  1.  Use Compression garment for Right Arm for 5 days  2.  Ice pack for 3 days  3.  No vigorous exercise of Right Arm for 5 days  4.  IR nurse will call in the next 2 days to follow up  5.  If sites begin to bleed, hold pressure for 5 minutes and call for instructions.   6.  Keep sites dry until tomorrow, you may shower tomorrow, do not scrub sites, just pat dry. Watch for signs of infection:  Redness, sore, swollen or odorous drainage.     [facility] INTERVENTIONAL RADIOLOGY DEPARTMENT        Procedure Physician:     Dr Marcial                          Date of procedure:  May 9, 2017        Telephone numbers:     [phone]      Monday-Friday 8:00 am to 4:30 pm                                              [phone]       After 4:30 pm Monday-Friday, Weekends & Holidays.                                                                           Ask for the Interventional  Radiologist on call. Someone is  available 24 hours/day          [facility] toll free number: [phone]  Monday-Friday 8:00 am to 4:30 pm

## 2017-05-09 NOTE — PROGRESS NOTES
Pt sleepy post procedure, pt did wake more and was weaned off oxygen and was able to eat something.  Tolerated PO. Friend at bedside.  Pt up walking and was steady on her feet. Sites remained flat and dry; arm elevated with ice for comfort.  Pt reported pain at incision sites on R upper arm, dr Marcial notified and she ordered a dose of pain med along with a prescription for pt to take with her. Script given to pt to take with her.  Reviewed with Dr Marcial discharge instructions; added these instructions to pt's copy. Discharge instructions reviewed and copy to pt. Discharged to home with friend.

## 2017-05-09 NOTE — PROGRESS NOTES
Interventional Radiology Pre-Procedure Sedation Assessment   Time of Assessment: 12:17 PM    Expected Level: Moderate Sedation    Indication: Sedation is required for the following type of Procedure: sclerotherapy    Sedation and procedural consent: Risks, benefits and alternatives were discussed with Patient    PO Intake: Appropriately NPO for procedure    ASA Class: Class 2 - MILD SYSTEMIC DISEASE, NO ACUTE PROBLEMS, NO FUNCTIONAL LIMITATIONS.    Mallampati: Grade 1:  Soft palate, uvula, tonsillar pillars, and posterior pharyngeal wall visible    Lungs: Lungs Clear with good breath sounds bilaterally    Heart: Normal heart sounds and rate    History and physical reviewed and no updates needed. I have reviewed the lab findings, diagnostic data, medications, and the plan for sedation. I have determined this patient to be an appropriate candidate for the planned sedation and procedure and have reassessed the patient IMMEDIATELY PRIOR to sedation and procedure.    Roseline Marcial MD

## 2017-05-09 NOTE — IP AVS SNAPSHOT
MRN:[id number]                      After Visit Summary   5/9/2017    Maritza Gallardo    MRN: [id number]           Visit Information        Department      5/9/2017  9:37 AM [address] [facility] [place]          Review of your medicines      UNREVIEWED medicines. Ask your doctor about these medicines        Dose / Directions    Biotin 2500 MCG Caps   Used for:  Venous malformation        Dose:  2500 mcg   Take 2,500 mcg by mouth daily   Refills:  0       calcium-vitamin D 600-400 MG-UNIT per tablet   Commonly known as:  CALTRATE        Dose:  1 tablet   Take 1 tablet by mouth 2 times daily   Refills:  0       fluticasone 50 MCG/ACT spray   Commonly known as:  FLONASE        Dose:  1 spray   Spray 1 spray into both nostrils daily as needed for rhinitis or allergies   Refills:  0       MULTIVITAMIN ADULT PO   Used for:  Venous malformation        Refills:  0       NIZORAL 2 % shampoo   Used for:  Venous malformation   Generic drug:  ketoconazole        Apply topically daily as needed for itching or irritation   Refills:  0       NONFORMULARY   Used for:  Venous malformation        Cranberry Extract   Refills:  0       NORGESTIMATE-ETHINYL ESTRADIOL OR   Used for:  Venous malformation        Refills:  0       * oxyCODONE 5 MG IR tablet   Commonly known as:  ROXICODONE   This may have changed:  Another medication with the same name was added. Make sure you understand how and when to take each.   Used for:  Venous malformation   Ask about: Which instructions should I use?        Dose:  5-10 mg   Take 1-2 tablets (5-10 mg) by mouth every 6 hours as needed for moderate to severe pain   Quantity:  20 tablet   Refills:  0       * oxyCODONE 5 MG IR tablet   Commonly known as:  ROXICODONE   This may have changed:  You were already taking a medication with the same name, and this prescription was added. Make sure you understand how and when to take each.   Used for:  Venous malformation   Ask about: Which instructions  should I use?        Dose:  5-10 mg   Take 1-2 tablets (5-10 mg) by mouth every 4 hours as needed for moderate to severe pain   Quantity:  30 tablet   Refills:  0       traMADol 50 MG tablet   Commonly known as:  ULTRAM   Used for:  Venous malformation        Take by mouth every 6 hours as needed for moderate pain   Refills:  0       triamcinolone 0.025 % cream   Commonly known as:  KENALOG   Used for:  Venous malformation        Apply topically 2 times daily   Refills:  0       ZYRTEC ALLERGY PO   Used for:  Venous malformation        Refills:  0       * Notice:  This list has 2 medication(s) that are the same as other medications prescribed for you. Read the directions carefully, and ask your doctor or other care provider to review them with you.         Where to get your medicines      Some of these will need a paper prescription and others can be bought over the counter. Ask your nurse if you have questions.     Bring a paper prescription for each of these medications     oxyCODONE 5 MG IR tablet               Prescriptions were sent or printed at these locations (1 Prescription)                   [facility] [place], [place] - [address]   [address], [facility] [address] [address]    Telephone:  [phone]   Fax:  [phone]   Hours:                  Printed at Department/Unit printer (1 of 1)         oxyCODONE (ROXICODONE) 5 MG IR tablet                 Protect others around you: Learn how to safely use, store and throw away your medicines at www.disposemymeds.org.         Follow-ups after your visit         Care Instructions        Further instructions from your care team       [facility]  Going Home after Sedation      For 24 hours:    An adult should stay with you.    Relax and take it easy.    DO NOT make any important legal decisions.    DO NOT drive or operate machines at home or at work.    Resume your regular diet and drink plenty of fluids.    CALL THE  "PHYSICIAN IF:    You develop nausea or vomiting    You develop hives or a rash or any unexplained itching    ADDITIONAL INSTRUCTIONS:  1.  Use Compression garment for Right Arm for 5 days  2.  Ice pack for 3 days  3.  No vigorous exercise of Right Arm for 5 days  4.  IR nurse will call in the next 2 days to follow up  5.  If sites begin to bleed, hold pressure for 5 minutes and call for instructions.   6.  Keep sites dry until tomorrow, you may shower tomorrow, do not scrub sites, just pat dry. Watch for signs of infection:  Redness, sore, swollen or odorous drainage.     [facility] INTERVENTIONAL RADIOLOGY DEPARTMENT        Procedure Physician:     Dr Marcial                          Date of procedure:  May 9, 2017        Telephone numbers:     [phone]      Monday-Friday 8:00 am to 4:30 pm                                              [phone]       After 4:30 pm Monday-Friday, Weekends & Holidays.                                                                           Ask for the Interventional  Radiologist on call. Someone is  available 24 hours/day          [facility] toll free number: [phone]  Monday-Friday 8:00 am to 4:30 pm                                                                                                                                                                                                                        Additional Information About Your Visit        Care EveryWhere ID     This is your Care EveryWhere ID. This could be used by other organizations to access your [place] medical records  [id number]        Your Vitals Were     Blood Pressure Pulse Temperature Respirations Height Weight    118/65 74 96  F (35.6  C) 16 1.651 m (5' 5\") 99.8 kg (220 lb)    Last Period Pulse Oximetry BMI (Body Mass Index)             04/13/2017 98% 36.61 kg/m2          Primary Care Provider Office Phone # Fax #    Deborah Carson [phone]      Thank you!     Thank you " for choosing [facility] for your care. Our goal is always to provide you with excellent care. Hearing back from our patients is one way we can continue to improve our services. Please take a few minutes to complete the written survey that you may receive in the mail after you visit with us. Thank you!             Medication List: This is a list of all your medications and when to take them. Check marks below indicate your daily home schedule. Keep this list as a reference.      Medications           Morning Afternoon Evening Bedtime As Needed    Biotin 2500 MCG Caps   Take 2,500 mcg by mouth daily                                calcium-vitamin D 600-400 MG-UNIT per tablet   Commonly known as:  CALTRATE   Take 1 tablet by mouth 2 times daily                                fluticasone 50 MCG/ACT spray   Commonly known as:  FLONASE   Spray 1 spray into both nostrils daily as needed for rhinitis or allergies                                MULTIVITAMIN ADULT PO                                NIZORAL 2 % shampoo   Apply topically daily as needed for itching or irritation   Generic drug:  ketoconazole                                NONFORMULARY   Cranberry Extract                                NORGESTIMATE-ETHINYL ESTRADIOL OR                                traMADol 50 MG tablet   Commonly known as:  ULTRAM   Take by mouth every 6 hours as needed for moderate pain                                triamcinolone 0.025 % cream   Commonly known as:  KENALOG   Apply topically 2 times daily                                ZYRTEC ALLERGY PO                                  ASK your doctor about these medications           Morning Afternoon Evening Bedtime As Needed    * oxyCODONE 5 MG IR tablet   Commonly known as:  ROXICODONE   Take 1-2 tablets (5-10 mg) by mouth every 6 hours as needed for moderate to severe pain   Ask about: Which instructions should I use?                                * oxyCODONE 5 MG IR tablet   Commonly known  as:  ROXICODONE   Take 1-2 tablets (5-10 mg) by mouth every 4 hours as needed for moderate to severe pain   Ask about: Which instructions should I use?                                * Notice:  This list has 2 medication(s) that are the same as other medications prescribed for you. Read the directions carefully, and ask your doctor or other care provider to review them with you.

## 2017-05-09 NOTE — PROGRESS NOTES
Patient returned from IR post procedure.  Dressing right upper arm dry & intact.  Arm very sore & tender to the touch.  Ice bag to site.  VSS.  She is sleepy, but gives appropriate responses.  Taking po water, but will wait with food until more awake.

## 2017-05-11 ENCOUNTER — TELEPHONE (OUTPATIENT)
Dept: INTERVENTIONAL RADIOLOGY/VASCULAR | Facility: CLINIC | Age: 22
End: 2017-05-11

## 2017-05-11 ENCOUNTER — HOSPITAL ENCOUNTER (OUTPATIENT)
Facility: CLINIC | Age: 22
End: 2017-05-11
Attending: RADIOLOGY | Admitting: RADIOLOGY
Payer: COMMERCIAL

## 2017-05-11 DIAGNOSIS — Q27.9 VENOUS MALFORMATION: Primary | ICD-10-CM

## 2017-05-11 NOTE — LETTER
May 11, 2017    Maritza Gallardo  [id number]  [facility] [address]    Dear Maritza,     I have you scheduled for your next sclerotherapy treatment with Dr. Marcial as follows:    Monday, June 19th @ 11:45 am, please check in at 10:15 am to the 3rd floor [facility] surgery check in.  [address] [place] [address].    -Nothing to eat or drink for 8 hours prior.  You may have sips of clear liquid up to 2 hours prior to procedure.  -You will need a ride as you will be receiving sedation.  - Plan on being there up to 6 hours that day.    Please call me if you have any questions or concerns as this date approaches.    Thanks,     GERMAINE Archer, RN, BSN  Interventional Radiology Care Coordinator   Phone:  [phone]

## 2017-05-11 NOTE — TELEPHONE ENCOUNTER
"I called and spoke with patient after her 2nd sclerotherapy treatment.  She states she is doing well. She has some discomfort but hasn't taken anything for it yet, she'll \"take those pills the  Gave me if I need it\".  Pt asked about removing the dressing, because Dr. Marcial did sclero to areas that are closer to the surface of the skin.  I encouraged her to remove the dressing and reapply if she notices drainage or open areas otherwise just remove and continue to wear compression like she has been doing.  I have asked her availability for next treatment, she prefers Monday June 5th or 19th.  Plan is to send her a letter to her updated address [address] [place] [address] with appt details.  Pt confirms this.  GERMAINE Archer RN, BSN  Interventional Radiology Care Coordinator   Phone:  [phone]    "

## 2017-06-13 ENCOUNTER — TELEPHONE (OUTPATIENT)
Dept: RADIOLOGY | Facility: CLINIC | Age: 22
End: 2017-06-13

## 2017-06-13 NOTE — TELEPHONE ENCOUNTER
"Patients grandmother called today.  She is concerned about Maritza using oxycodone for reasons other than pain.  She states Maritza does not currently live with her.  She was told by the person Maritza lives with that Maritza called to get a refill on her oxycodone, and that was done by a \"doctor in the [facility]\".  I looked back and the Oxycodone that was last prescribed was by Dr. Marcial post sclerotherapy treatment in May 9th, 2017 with no refills.  Per Grandmother Ileana, pt is having issues with mental health and chemical dependency.  I told her Maritza's next sclero tx is scheduled for 7/7/17 and that I would notify provider of her concerns.  I encouraged Ileana to contact PCP for guidance on next steps to help patient with her concerns.  Ileana would like me to keep patients tx appt as scheduled and hopes Maritza can \"get it together as she needs the treatment\".  Dr. Marcial was notified.  GERMAINE Archer RN, BSN  Interventional Radiology Care Coordinator   Phone:  [phone]    "

## 2017-06-29 ENCOUNTER — HOSPITAL ENCOUNTER (OUTPATIENT)
Facility: CLINIC | Age: 22
End: 2017-06-29
Payer: COMMERCIAL

## 2017-06-29 ENCOUNTER — TELEPHONE (OUTPATIENT)
Dept: INTERVENTIONAL RADIOLOGY/VASCULAR | Facility: CLINIC | Age: 22
End: 2017-06-29

## 2017-06-29 ENCOUNTER — HOSPITAL ENCOUNTER (OUTPATIENT)
Facility: CLINIC | Age: 22
End: 2017-06-29
Attending: RADIOLOGY | Admitting: RADIOLOGY

## 2017-06-29 NOTE — TELEPHONE ENCOUNTER
Per person who answered phone, patient no longer resides there.  GERMAINE Archer RN, BSN  Interventional Radiology Care Coordinator   Phone:  [phone]

## 2017-07-03 NOTE — OR NURSING
Pt contacted. Pt stated she needs to reschedule procedure because she just moved and needs to get settled before she has this procedure done. Notified patient that I would call Dr. Marcial's office and notify them of cancellation and have them call her to reschedule. She stated this would be fine and that she did not know when she would be able to schedule procedure at this time.     Abby Hammer, Nurse Manager of the IR dept notified and she will notify Dr. Marcial's nurse to cancel procedure for Friday and to call Maritza and reschedule her procedure for another date.

## 2017-07-05 ENCOUNTER — HOSPITAL ENCOUNTER (OUTPATIENT)
Facility: CLINIC | Age: 22
End: 2017-07-05
Attending: RADIOLOGY | Admitting: RADIOLOGY
Payer: COMMERCIAL

## 2017-10-07 ENCOUNTER — ANESTHESIA EVENT (OUTPATIENT)
Dept: SURGERY | Facility: CLINIC | Age: 22
End: 2017-10-07
Payer: COMMERCIAL

## 2017-10-09 ENCOUNTER — HOSPITAL ENCOUNTER (OUTPATIENT)
Facility: CLINIC | Age: 22
Discharge: HOME | End: 2017-10-09
Attending: RADIOLOGY | Admitting: RADIOLOGY
Payer: COMMERCIAL

## 2017-10-09 ENCOUNTER — HOSPITAL ENCOUNTER (OUTPATIENT)
Dept: INTERVENTIONAL RADIOLOGY/VASCULAR | Facility: CLINIC | Age: 22
End: 2017-10-09
Attending: RADIOLOGY | Admitting: RADIOLOGY
Payer: COMMERCIAL

## 2017-10-09 ENCOUNTER — ANESTHESIA (OUTPATIENT)
Dept: SURGERY | Facility: CLINIC | Age: 22
End: 2017-10-09
Payer: COMMERCIAL

## 2017-10-09 VITALS
RESPIRATION RATE: 24 BRPM | OXYGEN SATURATION: 98 % | DIASTOLIC BLOOD PRESSURE: 63 MMHG | SYSTOLIC BLOOD PRESSURE: 129 MMHG | BODY MASS INDEX: 36.99 KG/M2 | HEIGHT: 65 IN | WEIGHT: 222 LBS | TEMPERATURE: 97.9 F

## 2017-10-09 DIAGNOSIS — Q27.9 VASCULAR MALFORMATION: Primary | ICD-10-CM

## 2017-10-09 DIAGNOSIS — Q27.9 VENOUS MALFORMATION: ICD-10-CM

## 2017-10-09 LAB
APTT PPP: 26 SEC (ref 22–37)
ERYTHROCYTE [DISTWIDTH] IN BLOOD BY AUTOMATED COUNT: 12.5 % (ref 10–15)
GLUCOSE BLDC GLUCOMTR-MCNC: 87 MG/DL (ref 70–99)
HCG UR QL: NEGATIVE
HCT VFR BLD AUTO: 40 % (ref 35–47)
HGB BLD-MCNC: 13.9 G/DL (ref 11.7–15.7)
INR PPP: 0.97 (ref 0.86–1.14)
MCH RBC QN AUTO: 30.5 PG (ref 26.5–33)
MCHC RBC AUTO-ENTMCNC: 34.8 G/DL (ref 31.5–36.5)
MCV RBC AUTO: 88 FL (ref 78–100)
PLATELET # BLD AUTO: 253 10E9/L (ref 150–450)
RBC # BLD AUTO: 4.55 10E12/L (ref 3.8–5.2)
WBC # BLD AUTO: 9.1 10E9/L (ref 4–11)

## 2017-10-09 PROCEDURE — 25000566 ZZH SEVOFLURANE, EA 15 MIN

## 2017-10-09 PROCEDURE — 81025 URINE PREGNANCY TEST: CPT | Performed by: ANESTHESIOLOGY

## 2017-10-09 PROCEDURE — 85730 THROMBOPLASTIN TIME PARTIAL: CPT | Performed by: ANESTHESIOLOGY

## 2017-10-09 PROCEDURE — 27210905 ZZH KIT CR7

## 2017-10-09 PROCEDURE — 40000170 ZZH STATISTIC PRE-PROCEDURE ASSESSMENT II

## 2017-10-09 PROCEDURE — 25000128 H RX IP 250 OP 636: Performed by: ANESTHESIOLOGY

## 2017-10-09 PROCEDURE — C9399 UNCLASSIFIED DRUGS OR BIOLOG: HCPCS | Performed by: NURSE ANESTHETIST, CERTIFIED REGISTERED

## 2017-10-09 PROCEDURE — 37000008 ZZH ANESTHESIA TECHNICAL FEE, 1ST 30 MIN

## 2017-10-09 PROCEDURE — 71000014 ZZH RECOVERY PHASE 1 LEVEL 2 FIRST HR

## 2017-10-09 PROCEDURE — 25000125 ZZHC RX 250: Performed by: NURSE ANESTHETIST, CERTIFIED REGISTERED

## 2017-10-09 PROCEDURE — 82962 GLUCOSE BLOOD TEST: CPT

## 2017-10-09 PROCEDURE — 27211039 ZZH NEEDLE CR2

## 2017-10-09 PROCEDURE — 85027 COMPLETE CBC AUTOMATED: CPT | Performed by: ANESTHESIOLOGY

## 2017-10-09 PROCEDURE — 25000128 H RX IP 250 OP 636: Performed by: NURSE ANESTHETIST, CERTIFIED REGISTERED

## 2017-10-09 PROCEDURE — 85610 PROTHROMBIN TIME: CPT | Performed by: ANESTHESIOLOGY

## 2017-10-09 PROCEDURE — 25000128 H RX IP 250 OP 636: Performed by: RADIOLOGY

## 2017-10-09 PROCEDURE — 37000009 ZZH ANESTHESIA TECHNICAL FEE, EACH ADDTL 15 MIN

## 2017-10-09 PROCEDURE — 71000027 ZZH RECOVERY PHASE 2 EACH 15 MINS

## 2017-10-09 PROCEDURE — C1769 GUIDE WIRE: HCPCS

## 2017-10-09 PROCEDURE — 25000125 ZZHC RX 250: Performed by: RADIOLOGY

## 2017-10-09 PROCEDURE — 37241 VASC EMBOLIZE/OCCLUDE VENOUS: CPT

## 2017-10-09 RX ORDER — FENTANYL CITRATE 50 UG/ML
INJECTION, SOLUTION INTRAMUSCULAR; INTRAVENOUS PRN
Status: DISCONTINUED | OUTPATIENT
Start: 2017-10-09 | End: 2017-10-09

## 2017-10-09 RX ORDER — ONDANSETRON 2 MG/ML
4 INJECTION INTRAMUSCULAR; INTRAVENOUS EVERY 30 MIN PRN
Status: DISCONTINUED | OUTPATIENT
Start: 2017-10-09 | End: 2017-10-09 | Stop reason: HOSPADM

## 2017-10-09 RX ORDER — ONDANSETRON 4 MG/1
4 TABLET, ORALLY DISINTEGRATING ORAL EVERY 30 MIN PRN
Status: DISCONTINUED | OUTPATIENT
Start: 2017-10-09 | End: 2017-10-09 | Stop reason: HOSPADM

## 2017-10-09 RX ORDER — ACETAMINOPHEN 325 MG/1
6.45 TABLET ORAL
Status: DISCONTINUED | OUTPATIENT
Start: 2017-10-09 | End: 2017-10-09 | Stop reason: HOSPADM

## 2017-10-09 RX ORDER — OXYCODONE HYDROCHLORIDE 5 MG/1
5-10 TABLET ORAL EVERY 4 HOURS PRN
Qty: 20 TABLET | Refills: 0 | Status: SHIPPED | OUTPATIENT
Start: 2017-10-09 | End: 2017-10-09

## 2017-10-09 RX ORDER — PROPOFOL 10 MG/ML
INJECTION, EMULSION INTRAVENOUS PRN
Status: DISCONTINUED | OUTPATIENT
Start: 2017-10-09 | End: 2017-10-09

## 2017-10-09 RX ORDER — OXYCODONE HYDROCHLORIDE 5 MG/1
5 TABLET ORAL EVERY 4 HOURS PRN
Qty: 20 TABLET | Refills: 0 | Status: SHIPPED | OUTPATIENT
Start: 2017-10-09 | End: 2017-10-09

## 2017-10-09 RX ORDER — FENTANYL CITRATE 50 UG/ML
25-50 INJECTION, SOLUTION INTRAMUSCULAR; INTRAVENOUS
Status: DISCONTINUED | OUTPATIENT
Start: 2017-10-09 | End: 2017-10-09 | Stop reason: HOSPADM

## 2017-10-09 RX ORDER — CEFAZOLIN SODIUM 2 G/100ML
2 INJECTION, SOLUTION INTRAVENOUS ONCE
Status: DISCONTINUED | OUTPATIENT
Start: 2017-10-09 | End: 2017-10-09 | Stop reason: HOSPADM

## 2017-10-09 RX ORDER — KETOROLAC TROMETHAMINE 30 MG/ML
INJECTION, SOLUTION INTRAMUSCULAR; INTRAVENOUS PRN
Status: DISCONTINUED | OUTPATIENT
Start: 2017-10-09 | End: 2017-10-09

## 2017-10-09 RX ORDER — SODIUM CHLORIDE, SODIUM LACTATE, POTASSIUM CHLORIDE, CALCIUM CHLORIDE 600; 310; 30; 20 MG/100ML; MG/100ML; MG/100ML; MG/100ML
INJECTION, SOLUTION INTRAVENOUS CONTINUOUS
Status: DISCONTINUED | OUTPATIENT
Start: 2017-10-09 | End: 2017-10-09 | Stop reason: HOSPADM

## 2017-10-09 RX ORDER — HYDROMORPHONE HYDROCHLORIDE 1 MG/ML
.3-.5 INJECTION, SOLUTION INTRAMUSCULAR; INTRAVENOUS; SUBCUTANEOUS EVERY 10 MIN PRN
Status: DISCONTINUED | OUTPATIENT
Start: 2017-10-09 | End: 2017-10-09 | Stop reason: HOSPADM

## 2017-10-09 RX ORDER — MEPERIDINE HYDROCHLORIDE 25 MG/ML
12.5 INJECTION INTRAMUSCULAR; INTRAVENOUS; SUBCUTANEOUS
Status: DISCONTINUED | OUTPATIENT
Start: 2017-10-09 | End: 2017-10-09 | Stop reason: HOSPADM

## 2017-10-09 RX ORDER — LIDOCAINE HYDROCHLORIDE 20 MG/ML
INJECTION, SOLUTION INFILTRATION; PERINEURAL PRN
Status: DISCONTINUED | OUTPATIENT
Start: 2017-10-09 | End: 2017-10-09

## 2017-10-09 RX ORDER — LIDOCAINE 40 MG/G
CREAM TOPICAL
Status: DISCONTINUED | OUTPATIENT
Start: 2017-10-09 | End: 2017-10-09 | Stop reason: HOSPADM

## 2017-10-09 RX ORDER — OXYCODONE HYDROCHLORIDE 5 MG/1
5-10 TABLET ORAL EVERY 4 HOURS PRN
Qty: 20 TABLET | Refills: 0 | Status: ON HOLD | OUTPATIENT
Start: 2017-10-09 | End: 2020-08-25

## 2017-10-09 RX ORDER — ONDANSETRON 2 MG/ML
INJECTION INTRAMUSCULAR; INTRAVENOUS PRN
Status: DISCONTINUED | OUTPATIENT
Start: 2017-10-09 | End: 2017-10-09

## 2017-10-09 RX ORDER — NALOXONE HYDROCHLORIDE 0.4 MG/ML
.1-.4 INJECTION, SOLUTION INTRAMUSCULAR; INTRAVENOUS; SUBCUTANEOUS
Status: DISCONTINUED | OUTPATIENT
Start: 2017-10-09 | End: 2017-10-09 | Stop reason: HOSPADM

## 2017-10-09 RX ORDER — SODIUM TETRADECYL SULFATE 30 MG/ML
16 INJECTION, SOLUTION INTRAVENOUS ONCE
Status: COMPLETED | OUTPATIENT
Start: 2017-10-09 | End: 2017-10-09

## 2017-10-09 RX ORDER — DEXAMETHASONE SODIUM PHOSPHATE 4 MG/ML
INJECTION, SOLUTION INTRA-ARTICULAR; INTRALESIONAL; INTRAMUSCULAR; INTRAVENOUS; SOFT TISSUE PRN
Status: DISCONTINUED | OUTPATIENT
Start: 2017-10-09 | End: 2017-10-09

## 2017-10-09 RX ORDER — IOPAMIDOL 612 MG/ML
15 INJECTION, SOLUTION INTRAVASCULAR ONCE
Status: COMPLETED | OUTPATIENT
Start: 2017-10-09 | End: 2017-10-09

## 2017-10-09 RX ORDER — SODIUM CHLORIDE 9 MG/ML
INJECTION, SOLUTION INTRAVENOUS CONTINUOUS
Status: DISCONTINUED | OUTPATIENT
Start: 2017-10-09 | End: 2017-10-10 | Stop reason: HOSPADM

## 2017-10-09 RX ADMIN — FENTANYL CITRATE 50 MCG: 50 INJECTION, SOLUTION INTRAMUSCULAR; INTRAVENOUS at 12:45

## 2017-10-09 RX ADMIN — FENTANYL CITRATE 25 MCG: 50 INJECTION, SOLUTION INTRAMUSCULAR; INTRAVENOUS at 13:20

## 2017-10-09 RX ADMIN — ONDANSETRON 4 MG: 2 INJECTION INTRAMUSCULAR; INTRAVENOUS at 13:37

## 2017-10-09 RX ADMIN — HYDROMORPHONE HYDROCHLORIDE 0.3 MG: 1 INJECTION, SOLUTION INTRAMUSCULAR; INTRAVENOUS; SUBCUTANEOUS at 15:07

## 2017-10-09 RX ADMIN — FENTANYL CITRATE 25 MCG: 50 INJECTION, SOLUTION INTRAMUSCULAR; INTRAVENOUS at 13:29

## 2017-10-09 RX ADMIN — FENTANYL CITRATE 50 MCG: 50 INJECTION, SOLUTION INTRAMUSCULAR; INTRAVENOUS at 12:10

## 2017-10-09 RX ADMIN — SUGAMMADEX 200 MG: 100 INJECTION, SOLUTION INTRAVENOUS at 13:52

## 2017-10-09 RX ADMIN — MIDAZOLAM HYDROCHLORIDE 2 MG: 1 INJECTION, SOLUTION INTRAMUSCULAR; INTRAVENOUS at 11:54

## 2017-10-09 RX ADMIN — HYDROMORPHONE HYDROCHLORIDE 0.5 MG: 1 INJECTION, SOLUTION INTRAMUSCULAR; INTRAVENOUS; SUBCUTANEOUS at 14:16

## 2017-10-09 RX ADMIN — SODIUM CHLORIDE, POTASSIUM CHLORIDE, SODIUM LACTATE AND CALCIUM CHLORIDE: 600; 310; 30; 20 INJECTION, SOLUTION INTRAVENOUS at 12:02

## 2017-10-09 RX ADMIN — KETOROLAC TROMETHAMINE 30 MG: 30 INJECTION, SOLUTION INTRAMUSCULAR at 13:37

## 2017-10-09 RX ADMIN — LIDOCAINE HYDROCHLORIDE 100 MG: 20 INJECTION, SOLUTION INFILTRATION; PERINEURAL at 12:10

## 2017-10-09 RX ADMIN — SODIUM CHLORIDE, POTASSIUM CHLORIDE, SODIUM LACTATE AND CALCIUM CHLORIDE: 600; 310; 30; 20 INJECTION, SOLUTION INTRAVENOUS at 13:55

## 2017-10-09 RX ADMIN — FENTANYL CITRATE 25 MCG: 50 INJECTION, SOLUTION INTRAMUSCULAR; INTRAVENOUS at 13:37

## 2017-10-09 RX ADMIN — IOPAMIDOL 15 ML: 612 INJECTION, SOLUTION INTRAVENOUS at 13:56

## 2017-10-09 RX ADMIN — Medication 1 TABLET: at 15:18

## 2017-10-09 RX ADMIN — PROPOFOL 200 MG: 10 INJECTION, EMULSION INTRAVENOUS at 12:10

## 2017-10-09 RX ADMIN — LIDOCAINE HYDROCHLORIDE 1 ML: 10 INJECTION, SOLUTION EPIDURAL; INFILTRATION; INTRACAUDAL; PERINEURAL at 13:00

## 2017-10-09 RX ADMIN — TETRADECYL HYDROGEN SULFATE (ESTER) 8.5 ML: 30 INJECTION, SOLUTION INTRAVENOUS at 13:50

## 2017-10-09 RX ADMIN — FENTANYL CITRATE 25 MCG: 50 INJECTION, SOLUTION INTRAMUSCULAR; INTRAVENOUS at 13:10

## 2017-10-09 RX ADMIN — DEXAMETHASONE SODIUM PHOSPHATE 10 MG: 4 INJECTION, SOLUTION INTRAMUSCULAR; INTRAVENOUS at 12:15

## 2017-10-09 RX ADMIN — FENTANYL CITRATE 50 MCG: 50 INJECTION, SOLUTION INTRAMUSCULAR; INTRAVENOUS at 14:33

## 2017-10-09 RX ADMIN — SODIUM CHLORIDE, POTASSIUM CHLORIDE, SODIUM LACTATE AND CALCIUM CHLORIDE: 600; 310; 30; 20 INJECTION, SOLUTION INTRAVENOUS at 12:50

## 2017-10-09 ASSESSMENT — LIFESTYLE VARIABLES: TOBACCO_USE: 1

## 2017-10-09 NOTE — ANESTHESIA CARE TRANSFER NOTE
Patient: Maritza Gallardo    Procedure(s):  Out Of O.R. to I.R. For Right Arm and Chest Sclerotherapy Procedure @ 10:15    Diagnosis: Venous Malformation   Diagnosis Additional Information: No value filed.    Anesthesia Type:   General, LMA     Note:  Airway :Face Mask  Patient transferred to:PACU  Comments: Transfer to pacu for recovery.  Monitors placed.  VSS noted.  Report to RN.        Vitals: (Last set prior to Anesthesia Care Transfer)    CRNA VITALS  10/9/2017 1404 - 10/9/2017 1435      10/9/2017             Temp: 36.8  C (98.2  F)    SpO2: 99 %                Electronically Signed By: MELODIE GIBBS CRNA  October 9, 2017  2:35 PM

## 2017-10-09 NOTE — PROGRESS NOTES
10/9/2017    To Whom It May Concern,    Maritza Gallardo underwent a procedure at the [facility] on 10/9/2017. She is required to avoid heavy use of her right arm as much as possible for 5 days after the procedure, thus should perform light work duties with no heavy lifting for 5 days. She may return to usual activity on 10/15/2017.    Sincerely,      Roseline Marcial MD  Interventional Radiology Attending   [facility]  [phone]

## 2017-10-09 NOTE — ANESTHESIA PREPROCEDURE EVALUATION
Anesthesia Evaluation     . Pt has had prior anesthetic. Type: General           ROS/MED HX    ENT/Pulmonary:     (+)sleep apnea (Had a sleep study and diagnosed as having mild RICKY. ), tobacco use, Current use 0.5 packs/day  doesn't use CPAP , recent URI (Cold two weeks ago, still has some residual cough in the mornings.) . .    Neurologic:  - neg neurologic ROS     Cardiovascular:  - neg cardiovascular ROS   (+) ----. : . . . :. . No previous cardiac testing       METS/Exercise Tolerance:  >4 METS   Hematologic:  - neg hematologic  ROS       Musculoskeletal:  - neg musculoskeletal ROS       GI/Hepatic:     (+) GERD (Symptoms once per day.) Symptomatic,       Renal/Genitourinary:  - ROS Renal section negative       Endo:  - neg endo ROS       Psychiatric:  - neg psychiatric ROS       Infectious Disease:  - neg infectious disease ROS       Malignancy:      - no malignancy   Other:    - neg other ROS               Procedure: Procedure(s):  Out Of O.R. to I.R. For Right Arm and Chest Sclerotherapy Procedure @ 10:15 - Wound Class:     Maritza Gallardo is a 22 year old woman with GERD, sleep apnea, and history of congenital venous malformations over her right upper extremity and right chest s/p sclerotherapy x 2. Plan today for right arm and chest sclerotherapy with IR.      During her procedure with anesthesia on 4/17/17 she required conversion from sedation to general with LMA as she did not tolerate stimulation and required increased sedation with resultant desaturation to the 80s.       PMHx/PSHx:  Past Medical History:   Diagnosis Date     Gastroesophageal reflux disease      Sleep apnea      Venous malformation     right arm       Past Surgical History:   Procedure Laterality Date     Sedated MRI      age 6         No current facility-administered medications on file prior to encounter.   Current Outpatient Prescriptions on File Prior to Encounter:  oxyCODONE (ROXICODONE) 5 MG IR tablet Take 1-2 tablets (5-10 mg) by  mouth every 4 hours as needed for moderate to severe pain   calcium-vitamin D (CALTRATE) 600-400 MG-UNIT per tablet Take 1 tablet by mouth 2 times daily   Biotin 2500 MCG CAPS Take 2,500 mcg by mouth daily    ketoconazole (NIZORAL) 2 % shampoo Apply topically daily as needed for itching or irritation   traMADol (ULTRAM) 50 MG tablet Take by mouth every 6 hours as needed for moderate pain   triamcinolone (KENALOG) 0.025 % cream Apply topically 2 times daily         Physical Exam  Normal systems: dental    Airway   Mallampati: I  TM distance: >3 FB  Neck ROM: full    Dental     Cardiovascular   Rhythm and rate: regular and normal      Pulmonary    breath sounds clear to auscultation                    Anesthesia Plan      History & Physical Review  History and physical reviewed and following examination; no interval change.    ASA Status:  2 .    NPO Status:  > 8 hours    Plan for General and ETT with Intravenous induction. Maintenance will be Balanced.    PONV prophylaxis:  Ondansetron (or other 5HT-3) and Dexamethasone or Solumedrol    - GETA  - Relevant risks, benefits, alternatives and the anesthetic plan were discussed with patient/family or family representative.  All questions were answered and there was agreement to proceed.        Postoperative Care  Postoperative pain management:  IV analgesics and Multi-modal analgesia.      Consents  Anesthetic plan, risks, benefits and alternatives discussed with:  Patient.  Use of blood products discussed: No .   .        Jennifer West MD  Staff Pediatric Anesthesiologist  [phone]    6:45 AM  October 9, 2017

## 2017-10-09 NOTE — BRIEF OP NOTE
Interventional Radiology Brief Post Procedure Note    Procedure: IR VEIN SPIDER NON FACE SCLEROTHERAPY    Proceduralist: Roseline Marcial MD    Assistant: None    Time Out: Prior to the start of the procedure and with procedural staff participation, I verbally confirmed the patient s identity using two indicators, relevant allergies, that the procedure was appropriate and matched the consent or emergent situation, and that the correct equipment/implants were available. Immediately prior to starting the procedure I conducted the Time Out with the procedural staff and re-confirmed the patient s name, procedure, and site/side. (The Joint Commission universal protocol was followed.)  Yes    Medications   Medication Event Details Admin User Admin Time   lidocaine BUFFERED 1 % injection 15 mL Medication Given by Other Clinician Dose: 1 mL; Route: Intradermal; Scheduled Time: 12:45 PM Prieto Alfaro RN 10/9/2017  1:00 PM   sodium tetradecyl sulfate (SOTRADECOL) injection 480 mg Medication Given by Other Clinician Dose: 8.5 mL; Route: Intravenous; Scheduled Time:  8:45 AM Prieto Alfaro RN 10/9/2017  1:50 PM   iopamidol (ISOVUE-300) IV solution 61% 15 mL Medication Given Dose: 15 mL; Route: Intravenous; Scheduled Time: 12:45 PM Prieto Alfaro RN 10/9/2017  1:56 PM       Sedation: Monitored Anesthesia Care (MAC) administered and documented by Anesthesia Care Provider    Findings:   9 mL of 3% sotradecol via 15 punctures    Estimated Blood Loss: Minimal    Fluoroscopy Time: 1.2 minute(s)    SPECIMENS: None    Complications: 1. None     Condition: Stable    Plan:   1. Pain control with Ibuprofen and roxicodone if needed  2. No vigorous activity x 5 days  3. Return for additional treatment in 4-6 weeks, unless significant improvement after today's therapy    Comments: See dictated procedure note for full details.    Roseline Marcial MD

## 2017-10-09 NOTE — DISCHARGE INSTRUCTIONS
[facility]   Adult Discharge Orders & Instructions     For 24 hours after surgery    1. Get plenty of rest.  A responsible adult must stay with you for at least 24 hours after you leave the hospital.   2. Do not drive or use heavy equipment.  If you have weakness or tingling, don't drive or use heavy equipment until this feeling goes away.  3. Do not drink alcohol.  4. Avoid strenuous or risky activities.  Ask for help when climbing stairs.   5. You may feel lightheaded.  IF so, sit for a few minutes before standing.  Have someone help you get up.   6. If you have nausea (feel sick to your stomach): Drink only clear liquids such as apple juice, ginger ale, broth or 7-Up.  Rest may also help.  Be sure to drink enough fluids.  Move to a regular diet as you feel able.  7. You may have a slight fever. Call the doctor if your fever is over 100 F (37.7 C) (taken under the tongue) or lasts longer than 24 hours.  8. You may have a dry mouth, a sore throat, muscle aches or trouble sleeping.  These should go away after 24 hours.  9. Do not make important or legal decisions.   Call your doctor for any of the followin.  Signs of infection (fever, growing tenderness at the surgery site, a large amount of drainage or bleeding, severe pain, foul-smelling drainage, redness, swelling).    2. It has been over 8 to 10 hours since surgery and you are still not able to urinate (pass water).    3.  Headache for over 24 hours.    4.  Numbness, tingling or weakness the day after surgery (if you had spinal anesthesia).  To contact a doctor, call ____________[phone], interventional radiology.    Ice for first 3 days.  Rest arm.  No vigorous activity, light duty, no heavy lifting x5 days.

## 2017-10-09 NOTE — IP AVS SNAPSHOT
MRN:[id number]                      After Visit Summary   10/9/2017    Maritza Gallardo    MRN: [id number]           Thank you!     Thank you for choosing [facility] for your care. Our goal is always to provide you with excellent care. Hearing back from our patients is one way we can continue to improve our services. Please take a few minutes to complete the written survey that you may receive in the mail after you visit with us. Thank you!        Patient Information     Date Of Birth          1995        About your hospital stay     You were admitted on:  October 9, 2017 You last received care in the:  [facility] PACU    You were discharged on:  October 9, 2017       Who to Call     For medical emergencies, please call 911.  For non-urgent questions about your medical care, please call your primary care provider or clinic, [phone]  For questions related to your surgery, please call your surgery clinic        Attending Provider     Provider Specialty    Roseline Marcial MD Radiology       Primary Care Provider Office Phone # Fax #    Deborah Yue Carson [phone]      Further instructions from your care team       [facility]   Adult Discharge Orders & Instructions     For 24 hours after surgery    1. Get plenty of rest.  A responsible adult must stay with you for at least 24 hours after you leave the hospital.   2. Do not drive or use heavy equipment.  If you have weakness or tingling, don't drive or use heavy equipment until this feeling goes away.  3. Do not drink alcohol.  4. Avoid strenuous or risky activities.  Ask for help when climbing stairs.   5. You may feel lightheaded.  IF so, sit for a few minutes before standing.  Have someone help you get up.   6. If you have nausea (feel sick to your stomach): Drink only clear liquids such as apple juice, ginger ale, broth or 7-Up.  Rest may also help.  Be sure to drink enough fluids.  Move to a regular diet  "as you feel able.  7. You may have a slight fever. Call the doctor if your fever is over 100 F (37.7 C) (taken under the tongue) or lasts longer than 24 hours.  8. You may have a dry mouth, a sore throat, muscle aches or trouble sleeping.  These should go away after 24 hours.  9. Do not make important or legal decisions.   Call your doctor for any of the followin.  Signs of infection (fever, growing tenderness at the surgery site, a large amount of drainage or bleeding, severe pain, foul-smelling drainage, redness, swelling).    2. It has been over 8 to 10 hours since surgery and you are still not able to urinate (pass water).    3.  Headache for over 24 hours.    4.  Numbness, tingling or weakness the day after surgery (if you had spinal anesthesia).  To contact a doctor, call ____________[phone], interventional radiology.    Ice for first 3 days.  Rest arm.  No vigorous activity, light duty, no heavy lifting x5 days.    Additional Information     If you use hormonal birth control (such as the pill, patch, ring or implants): You'll need a second form of birth control for 7 days (condoms, a diaphragm or contraceptive foam). While in the hospital, you received a medicine called Bridion. Your normal birth control will not work as well for a week after taking this medicine.          Pending Results     Date and Time Order Name Status Description    10/9/2017 1108 IR Vein Spider Non Face Sclerotherapy In process             Admission Information     Date & Time Provider Department Dept. Phone    10/9/2017 Roseline Marcial MD [facility] PACU [phone]      Your Vitals Were     Blood Pressure Temperature Respirations Height Weight Last Period    129/63 98.4  F (36.9  C) (Oral) 28 1.645 m (5' 4.75\") 100.7 kg (222 lb 0.1 oz) 2017 (Approximate)    Pulse Oximetry BMI (Body Mass Index)                97% 37.23 kg/m2          MyChart Information     [employer] lets you send messages to your doctor, view your " "test results, renew your prescriptions, schedule appointments and more. To sign up, go to [email or web address] . Click on \"Log in\" on the left side of the screen, which will take you to the Welcome page. Then click on \"Sign up Now\" on the right side of the page.     You will be asked to enter the access code listed below, as well as some personal information. Please follow the directions to create your username and password.     Your access code is: [email or web address]  Expires: 2018  2:03 PM     Your access code will  in 90 days. If you need help or a new code, please call your [facility] clinic or [phone].        Care EveryWhere ID     This is your Care EveryWhere ID. This could be used by other organizations to access your [facility] medical records  [id number]        Equal Access to Services     [facility] : Fina Washington, reinaldo song, usama desouza, radha anaya . So [facility] [phone].    ATENCIÓN: Si habla español, tiene a sol disposición servicios gratuitos de asistencia lingüística. Lucretia al [phone].    We comply with applicable federal civil rights laws and [place] laws. We do not discriminate on the basis of race, color, national origin, age, disability, sex, sexual orientation, or gender identity.               Review of your medicines      UNREVIEWED medicines. Ask your doctor about these medicines        Dose / Directions    Biotin 2500 MCG Caps   Used for:  Venous malformation        Dose:  2500 mcg   Take 2,500 mcg by mouth daily   Refills:  0       calcium-vitamin D 600-400 MG-UNIT per tablet   Commonly known as:  CALTRATE        Dose:  1 tablet   Take 1 tablet by mouth 2 times daily   Refills:  0       FEXOFENADINE HCL PO        Dose:  180 mg   Take 180 mg by mouth daily   Refills:  0       NIZORAL 2 % shampoo   Used for:  Venous malformation   Generic drug:  ketoconazole        Apply topically daily as needed for itching " or irritation   Refills:  0       traMADol 50 MG tablet   Commonly known as:  ULTRAM   Used for:  Venous malformation        Take by mouth every 6 hours as needed for moderate pain   Refills:  0       triamcinolone 0.025 % cream   Commonly known as:  KENALOG   Used for:  Venous malformation        Apply topically 2 times daily   Refills:  0         START taking        Dose / Directions    oxyCODONE 5 MG IR tablet   Commonly known as:  ROXICODONE   Used for:  Venous malformation        Dose:  5-10 mg   Take 1-2 tablets (5-10 mg) by mouth every 4 hours as needed for moderate to severe pain   Quantity:  20 tablet   Refills:  0            Where to get your medicines      Some of these will need a paper prescription and others can be bought over the counter. Ask your nurse if you have questions.     Bring a paper prescription for each of these medications     oxyCODONE 5 MG IR tablet                Protect others around you: Learn how to safely use, store and throw away your medicines at www.disposemymeds.org.             Medication List: This is a list of all your medications and when to take them. Check marks below indicate your daily home schedule. Keep this list as a reference.      Medications           Morning Afternoon Evening Bedtime As Needed    Biotin 2500 MCG Caps   Take 2,500 mcg by mouth daily                                calcium-vitamin D 600-400 MG-UNIT per tablet   Commonly known as:  CALTRATE   Take 1 tablet by mouth 2 times daily                                FEXOFENADINE HCL PO   Take 180 mg by mouth daily                                NIZORAL 2 % shampoo   Apply topically daily as needed for itching or irritation   Generic drug:  ketoconazole                                oxyCODONE 5 MG IR tablet   Commonly known as:  ROXICODONE   Take 1-2 tablets (5-10 mg) by mouth every 4 hours as needed for moderate to severe pain                                traMADol 50 MG tablet   Commonly known as:   ULTRAM   Take by mouth every 6 hours as needed for moderate pain                                triamcinolone 0.025 % cream   Commonly known as:  KENALOG   Apply topically 2 times daily

## 2017-10-09 NOTE — IP AVS SNAPSHOT
[place] [address] [facility] [address]    Phone:  [phone]                                       After Visit Summary   10/9/2017    Maritza Gallardo    MRN: [id number]           After Visit Summary Signature Page     I have received my discharge instructions, and my questions have been answered. I have discussed any challenges I see with this plan with the nurse or doctor.    ..........................................................................................................................................  Patient/Patient Representative Signature      ..........................................................................................................................................  Patient Representative Print Name and Relationship to Patient    ..................................................               ................................................  Date                                            Time    ..........................................................................................................................................  Reviewed by Signature/Title    ...................................................              ..............................................  Date                                                            Time

## 2017-10-09 NOTE — ANESTHESIA POSTPROCEDURE EVALUATION
Patient: Maritza Gallardo    Procedure(s):  Out Of O.R. to I.R. For Right Arm and Chest Sclerotherapy Procedure @ 10:15    Diagnosis:Venous Malformation   Diagnosis Additional Information: No value filed.    Anesthesia Type:  General, LMA    Note:  Anesthesia Post Evaluation    Patient location during evaluation: PACU  Patient participation: Unable to participate in evaluation secondary to age  Level of consciousness: awake  Pain management: adequate  Airway patency: patent  Cardiovascular status: acceptable  Respiratory status: acceptable  Hydration status: acceptable  PONV: none     Anesthetic complications: None          Last vitals:  Vitals:    10/09/17 1427 10/09/17 1500 10/09/17 1545   BP: 129/63     Resp: 28  24   Temp: 36.8  C (98.2  F) 36.9  C (98.4  F) 36.6  C (97.9  F)   SpO2: 100% 97% 98%         Electronically Signed By: Vianey Calvillo MD  October 9, 2017  4:56 PM

## 2017-10-11 ENCOUNTER — TELEPHONE (OUTPATIENT)
Dept: RADIOLOGY | Facility: CLINIC | Age: 22
End: 2017-10-11

## 2017-10-11 DIAGNOSIS — Q27.9 VM (VASCULAR MALFORMATION): Primary | ICD-10-CM

## 2017-10-11 NOTE — TELEPHONE ENCOUNTER
I called and left a voicemail including my call back number.  I called to see how pt was doing post sclero treatment by Dr. Marcial 10/9/17 SIME venous malformation.  I informed her schedulers will be contacting her to schedule next sclero treatment due in 4-6 weeks, unless her symptoms are markedly improved.  GERMAINE Archer RN, BSN  Interventional Radiology Care Coordinator   Phone:  [phone]

## 2017-10-19 ENCOUNTER — HOSPITAL ENCOUNTER (OUTPATIENT)
Facility: CLINIC | Age: 22
End: 2017-10-19
Payer: COMMERCIAL

## 2017-12-31 ENCOUNTER — HEALTH MAINTENANCE LETTER (OUTPATIENT)
Age: 22
End: 2017-12-31

## 2020-04-23 ENCOUNTER — TELEPHONE (OUTPATIENT)
Dept: RADIOLOGY | Facility: CLINIC | Age: 25
End: 2020-04-23

## 2020-04-23 NOTE — TELEPHONE ENCOUNTER
I called and left a voicemail including my call back number.  I called to find out more about her symptoms.  Dr Marcial had received a referral from Julieta Zapien that Maritza is having numbness and pain post 3 treatments of sclerotherapy to her right upper extremity vascular malformation.   GERMAINE Archer RN, BSN  Interventional Radiology Nurse Coordinator   Phone:  [phone]

## 2020-04-23 NOTE — TELEPHONE ENCOUNTER
I was able to speak with Maritza.  She has been having worsening pain and swelling after her 10/2017 treatment.  Her PCP Julieta Zapien is sending her back to speak with Dr Marcial.  Pt tried compression sleeve from hand to armpit but stated it make her pain worse.  She notices whole arm swelling and pain with movement/use.  She gets numbness and tingling when using her left arm to apply deoderant.  She is unable to work and is wondering about a letter from Dr Marcial so she can apply for disability.  She notes the veins are bulgy and she at times can't write due to pain.  She is wondering about the nerves worsening, if more treatment would be beneficial.  She state nothing over the counter including ibuprofen helps her pain.  She has tried marijuana which provides some relief and is seeing someone she thinks towards the end of the month to inquire about getting medical marijuana.  We talked about VLC and possibility of additional updated imaging.  She was given the call center number to update her insurance info and I have updated her address in the system. Plan is to review with Dr Marcial and call pt with recommendations.  GERMAINE Archer, RN, BSN  Interventional Radiology Nurse Coordinator   Phone:  [phone]

## 2020-04-24 DIAGNOSIS — Q27.9 VASCULAR MALFORMATION: Primary | ICD-10-CM

## 2020-05-04 ENCOUNTER — HOSPITAL ENCOUNTER (OUTPATIENT)
Dept: MRI IMAGING | Facility: CLINIC | Age: 25
End: 2020-05-04
Attending: RADIOLOGY
Payer: COMMERCIAL

## 2020-05-04 DIAGNOSIS — Q27.9 VASCULAR MALFORMATION: ICD-10-CM

## 2020-05-04 PROCEDURE — 25500064 ZZH RX 255 OP 636: Performed by: RADIOLOGY

## 2020-05-04 PROCEDURE — 73220 MRI UPPR EXTREMITY W/O&W/DYE: CPT | Mod: RT

## 2020-05-04 PROCEDURE — A9585 GADOBUTROL INJECTION: HCPCS | Performed by: RADIOLOGY

## 2020-05-04 PROCEDURE — 40000556 ZZH STATISTIC PERIPHERAL IV START W US GUIDANCE

## 2020-05-04 PROCEDURE — 73220 MRI UPPR EXTREMITY W/O&W/DYE: CPT | Mod: RT,XS

## 2020-05-04 RX ORDER — GADOBUTROL 604.72 MG/ML
10 INJECTION INTRAVENOUS ONCE
Status: COMPLETED | OUTPATIENT
Start: 2020-05-04 | End: 2020-05-04

## 2020-05-04 RX ADMIN — GADOBUTROL 8 ML: 604.72 INJECTION INTRAVENOUS at 17:30

## 2020-05-06 ENCOUNTER — VIRTUAL VISIT (OUTPATIENT)
Dept: DERMATOLOGY | Facility: CLINIC | Age: 25
End: 2020-05-06
Attending: RADIOLOGY
Payer: COMMERCIAL

## 2020-05-06 DIAGNOSIS — Q27.9 VASCULAR MALFORMATION: Primary | ICD-10-CM

## 2020-05-07 PROBLEM — Q27.9 VASCULAR MALFORMATION: Status: ACTIVE | Noted: 2020-05-07

## 2020-05-07 NOTE — PROGRESS NOTES
"Maritza Gallardo is a 24 year old female who is being evaluated via a telephone visit.      The patient has been notified of following (by M.A)     \"We have found that certain health care needs can be provided without the need for a physical exam.  This service lets us provide the care you need with a short phone conversation.  If a prescription is necessary we can send it directly to your pharmacy.  If lab work is needed we can place an order for that and you can then stop by our lab to have the test done at a later time.    This telephone visit will be conducted via 3 way call with the you (the patient) , the physician/provider, and a me all on the line at the same time.  This allows your physician/provider to have the phone conversation with you while I will be taking notes for your medical record.  We will have full access to your [facility] medical record during this entire phone call.    Since this is like an office visit,  will bill your insurance company for this service.  Please check with your medical insurance if this type of telephone/virtual is covered . You may be responsible for the cost of this service if insurance coverage is denied.  The typical cost is $30 (10min), $59(11-20min) and $85 (21-30min)     If during the course of the call the physician/provider feels a telephone visit is not appropriate, you will not be charged for this service\"    Consent has been obtained for this service by care team member: yes.  See the scanned image in the medical record.    S: The history as provided by the patient to the provider during this call include see below    Pertinent parts of the the patient's medical history reviewed and confirmed by the provider included : see below     Total time of call between patient and provider was 45 minutes     Roseline Marcial MD  Interventional Radiology   Pager [phone] (MD signature)  ===================================================    I have reviewed the note as " documented above.  This accurately captures the substance of my conversation with the patient,    Additional provider notes:        INTERVENTIONAL RADIOLOGY CONSULTATION    Name: Maritza Gallardo  Age: 24 year old   Referring Physician: Dr. De La Torre ref. provider found   REASON FOR REFERRAL: vascular malformation    HPI: This is a 24-year-old female known to the interventional radiology service.  She has an extensive right upper extremity venous malformation, which was treated with sclerotherapy in most recently October 2017.  She has re-pursued care for this, as her pain over the last 2 years has significantly, gradually worsened.  Now she has daily pain which is typically achy but occasionally sharp that extends from the shoulder to the wrist of the right arm.  She has pain every day, all day, as bad as 8 out of 10.  She notices her pain is worse with any activity, which makes any arm use quite difficult for her, including with minor things such as carrying groceries.  She did try to wear compression, but felt it was too tight and caused more discomfort.  Over-the-counter medications do not help.  In fact, she cannot identify anything that helps the pain except rest.  Again her pain got progressively worse such that she contacted me.  She does notice on occasion she identifies small palpable pebble-like areas in the lesion, consistent with phleboliths.    She is not on any sort of oral contraceptive or hormonal therapy.  She has no children.    Her review of systems is otherwise negative including skin rash, fever, shortness of breath, cough, chest pain, palpitations, abdominal pain, or lower extremity swelling.    She does have sleep apnea and has been prescribed a mask.  She has had more difficulty wearing the mask recently because she needs some sort of replacement parts.  I encouraged her to pursue those.        PAST MEDICAL HISTORY:   Past Medical History:   Diagnosis Date     Amenorrhea      Chronic low back pain       Gastroesophageal reflux disease      Sleep apnea      Venous malformation     right arm       PAST SURGICAL HISTORY:   Past Surgical History:   Procedure Laterality Date     SCLEROTHERAPY      right arm, chest     Sedated MRI      age 6   Hip surgery for labral tear 2018    FAMILY HISTORY:   No family history on file.    SOCIAL HISTORY:   Social History     Tobacco Use     Smoking status: Heavy Tobacco Smoker     Packs/day: 0.50     Types: Cigarettes     Smokeless tobacco: Never Used   Substance Use Topics     Alcohol use: No       PROBLEM LIST:   Patient Active Problem List    Diagnosis Date Noted     Vascular malformation 05/07/2020     Priority: Medium       MEDICATIONS:   Prescription Medications as of 5/8/2020       Rx Number Disp Refills Start End Last Dispensed Date Next Fill Date Owning Pharmacy    Biotin 2500 MCG CAPS            Sig: Take 2,500 mcg by mouth daily     Class: Historical    Route: Oral    calcium-vitamin D (CALTRATE) 600-400 MG-UNIT per tablet            Sig: Take 1 tablet by mouth 2 times daily    Class: Historical    Route: Oral    FEXOFENADINE HCL PO            Sig: Take 180 mg by mouth daily    Class: Historical    Route: Oral    ketoconazole (NIZORAL) 2 % shampoo            Sig: Apply topically daily as needed for itching or irritation    Class: Historical    Route: Topical    oxyCODONE (ROXICODONE) 5 MG IR tablet  20 tablet 0 10/9/2017    [place], [place] - [address]    Sig: Take 1-2 tablets (5-10 mg) by mouth every 4 hours as needed for moderate to severe pain    Class: Local Print    Earliest Fill Date: 10/9/2017    Route: Oral    traMADol (ULTRAM) 50 MG tablet            Sig: Take by mouth every 6 hours as needed for moderate pain    Class: Historical    Route: Oral    triamcinolone (KENALOG) 0.025 % cream            Sig: Apply topically 2 times daily    Class: Historical    Route: Topical          ALLERGIES:   Adhesive tape and Seasonal  allergies    ROS:  As above    Physical Examination:   VITALS:   There were no vitals taken for this visit.  Constitutional: Appropriate phone conversation, mentation normal.  No additional physical exam due to telephone visit.    Labs:  none    Diagnostic studies:   Imaging reviewed by me. MRI 5/4/2020 shows extensive [id number] lobulated lesion in the subcutaneous and some deep muscular compartment of the right upper extremity extending from shoulder through level of wrist.  In the upper arm, the lesion is confined to subcutaneous fat.  From elbow through wrist, there is significant amount of intramuscular involvement as well as lesion and subcutaneous fat.  Enhancement noted in the lesion, overall consistent with venous malformation.      Radiologist Interpretation:  Impression:  Extensive vascular malformation located within the anterior, anterolateral and anteromedial subcutaneous tissues of the right arm extending proximally from just proximal to the AC joint to the level of the volar aspect of the base of the thumb distally remain within the subcutaneous tissues just deep to the skin surface throughout its entire course.    Assessment: 24-year-old female with severely symptomatic, extensive venous malformation of the right upper extremity.  Her symptoms are such that she is having significant difficulty using her arm for minor tasks.  Sclerotherapy in addition to other conservative therapy at this point is warranted.  I discussed the process of sclerotherapy, which is a means to shrink/control size of the lesion but is not a cure.  I would focus on her most symptomatic areas first and treat additional area subsequently.  I would offer series of 4 treatments, each treatment 4 weeks apart.  I explained the process again of sclerotherapy, which is introducing a chemical sclerosant into the lesion under imaging guidance.  I also discussed the risks of skin or soft tissue injury and ulceration, nerve or  vascular injury, infection, need for repeat treatment in the future.  She would like to proceed with all recommended therapy.    Plan:  1.  Sclerotherapy, 4 sessions, each session 4 weeks apart.  2.  Lighter compression garment-15-20 mmHg, shoulder to hand (fingerless).  3.  At the time of her neck sclerotherapy we will perform coagulation studies for consideration of aspirin initiation.  4.  I discussed smoking cessation with her and offered her researches to stop smoking.  She declines at present.  5. Avoid estrogen containing OCPs and devices. Should she need contraceptive advice, I would consult Dr. Pritchard.    It was a pleasure to speak to Ms. Gallardo on the phone today.  Thank you for involving the interventional radiology service in her care.    I spent a total of 30 minutes on this phone consultation today, 50% time was for counseling and care coordination.    Roseline Marcial MD  Interventional Radiology   Pager [phone]      CC  Patient Care Team:  Lucita Alcazar, RN as Registered Nurse  Roseline Marcial MD as MD (Radiology)

## 2020-05-08 NOTE — PATIENT INSTRUCTIONS
You have been contacted today by Dr Marcial for return visit to discuss your arm vascular malformation.  Your MRI completed on 5/4/20 has been discussed with you.     Plan  -[facility].  -wear this compression garment to help with your symptoms as tolerated.  -Dr Marcial would like to schedule 3 sessions of sclerotherapy 4 to 6 weeks apart.  Sclerotherapy requires insurance prior authorization.  I will submit for authorization.  Due to Covid 19 procedures are being scheduled based on necessity and following restriction guidelines.    -I will contact you to schedule your procedures once it has been deemed ok to schedule.    Please don't hesitate to contact me with questions or concerns,    A. Audra Archer, RN, BSN  Interventional Radiology Nurse Coordinator   Phone:  [phone]

## 2020-05-11 DIAGNOSIS — Q27.9 VASCULAR MALFORMATION: Primary | ICD-10-CM

## 2020-07-03 ENCOUNTER — TELEPHONE (OUTPATIENT)
Dept: RADIOLOGY | Facility: CLINIC | Age: 25
End: 2020-07-03

## 2020-07-03 DIAGNOSIS — Q27.9 VASCULAR MALFORMATION: Primary | ICD-10-CM

## 2020-07-03 NOTE — TELEPHONE ENCOUNTER
I called and spoke with Maritza to set her up for sclerotherapy with Dr Marcial.  She prefers [facility], and I have a message to [facility] scheduling team for call back.  I will mail a letter with appt details once obtained.  She has a wedding Aug 8th and bruises easily so would like to avoid that date otherwise very flexible.  GERMAINE Archer RN, BSN  Interventional Radiology Nurse Coordinator   Phone:  [phone]

## 2020-07-07 DIAGNOSIS — Z11.59 ENCOUNTER FOR SCREENING FOR OTHER VIRAL DISEASES: Primary | ICD-10-CM

## 2020-07-13 DIAGNOSIS — Z11.59 ENCOUNTER FOR SCREENING FOR OTHER VIRAL DISEASES: Primary | ICD-10-CM

## 2020-08-20 ENCOUNTER — NURSE TRIAGE (OUTPATIENT)
Dept: NURSING | Facility: CLINIC | Age: 25
End: 2020-08-20

## 2020-08-20 NOTE — TELEPHONE ENCOUNTER
I explained she needs the surgeon to write the order in the [facility] chart and they can then call back to set up an appointment for covid19 testing. I told her otherwise, she's asymptomatic, the [employer] system won't do the testing without exposure to a positive covid19 person. She is going to call her surgeon's office.  Analy Forte RN  [facility]

## 2020-08-25 ENCOUNTER — HOSPITAL ENCOUNTER (OUTPATIENT)
Dept: INTERVENTIONAL RADIOLOGY/VASCULAR | Facility: CLINIC | Age: 25
End: 2020-08-25
Attending: RADIOLOGY | Admitting: RADIOLOGY
Payer: COMMERCIAL

## 2020-08-25 ENCOUNTER — ANESTHESIA EVENT (OUTPATIENT)
Dept: SURGERY | Facility: CLINIC | Age: 25
End: 2020-08-25
Payer: COMMERCIAL

## 2020-08-25 ENCOUNTER — ANESTHESIA (OUTPATIENT)
Dept: SURGERY | Facility: CLINIC | Age: 25
End: 2020-08-25
Payer: COMMERCIAL

## 2020-08-25 ENCOUNTER — HOSPITAL ENCOUNTER (OUTPATIENT)
Facility: CLINIC | Age: 25
Discharge: HOME | End: 2020-08-25
Attending: RADIOLOGY | Admitting: RADIOLOGY
Payer: COMMERCIAL

## 2020-08-25 VITALS
SYSTOLIC BLOOD PRESSURE: 112 MMHG | OXYGEN SATURATION: 98 % | HEIGHT: 65 IN | RESPIRATION RATE: 12 BRPM | BODY MASS INDEX: 38.75 KG/M2 | WEIGHT: 232.59 LBS | DIASTOLIC BLOOD PRESSURE: 67 MMHG | TEMPERATURE: 98.2 F | HEART RATE: 64 BPM

## 2020-08-25 DIAGNOSIS — Q27.9 VASCULAR MALFORMATION: ICD-10-CM

## 2020-08-25 DIAGNOSIS — Q27.9 VASCULAR MALFORMATION: Primary | ICD-10-CM

## 2020-08-25 LAB
APTT PPP: 28 SEC (ref 22–37)
B-HCG SERPL-ACNC: <1 IU/L (ref 0–5)
ERYTHROCYTE [DISTWIDTH] IN BLOOD BY AUTOMATED COUNT: 12.4 % (ref 10–15)
GLUCOSE SERPL-MCNC: 84 MG/DL (ref 70–99)
HCT VFR BLD AUTO: 43.1 % (ref 35–47)
HGB BLD-MCNC: 14.8 G/DL (ref 11.7–15.7)
INR PPP: 0.98 (ref 0.86–1.14)
MCH RBC QN AUTO: 30.6 PG (ref 26.5–33)
MCHC RBC AUTO-ENTMCNC: 34.3 G/DL (ref 31.5–36.5)
MCV RBC AUTO: 89 FL (ref 78–100)
PLATELET # BLD AUTO: 284 10E9/L (ref 150–450)
RBC # BLD AUTO: 4.83 10E12/L (ref 3.8–5.2)
WBC # BLD AUTO: 10.8 10E9/L (ref 4–11)

## 2020-08-25 PROCEDURE — 82947 ASSAY GLUCOSE BLOOD QUANT: CPT | Performed by: RADIOLOGY

## 2020-08-25 PROCEDURE — 84702 CHORIONIC GONADOTROPIN TEST: CPT | Performed by: RADIOLOGY

## 2020-08-25 PROCEDURE — 25000132 ZZH RX MED GY IP 250 OP 250 PS 637: Performed by: RADIOLOGY

## 2020-08-25 PROCEDURE — 37000008 ZZH ANESTHESIA TECHNICAL FEE, 1ST 30 MIN

## 2020-08-25 PROCEDURE — 25000125 ZZHC RX 250: Performed by: NURSE ANESTHETIST, CERTIFIED REGISTERED

## 2020-08-25 PROCEDURE — 25800030 ZZH RX IP 258 OP 636: Performed by: NURSE ANESTHETIST, CERTIFIED REGISTERED

## 2020-08-25 PROCEDURE — 37241 VASC EMBOLIZE/OCCLUDE VENOUS: CPT

## 2020-08-25 PROCEDURE — 85730 THROMBOPLASTIN TIME PARTIAL: CPT | Performed by: RADIOLOGY

## 2020-08-25 PROCEDURE — 25000125 ZZHC RX 250: Performed by: RADIOLOGY

## 2020-08-25 PROCEDURE — 25000128 H RX IP 250 OP 636: Performed by: ANESTHESIOLOGY

## 2020-08-25 PROCEDURE — 25000566 ZZH SEVOFLURANE, EA 15 MIN

## 2020-08-25 PROCEDURE — 40000170 ZZH STATISTIC PRE-PROCEDURE ASSESSMENT II

## 2020-08-25 PROCEDURE — 71000014 ZZH RECOVERY PHASE 1 LEVEL 2 FIRST HR

## 2020-08-25 PROCEDURE — 71000027 ZZH RECOVERY PHASE 2 EACH 15 MINS

## 2020-08-25 PROCEDURE — 37000009 ZZH ANESTHESIA TECHNICAL FEE, EACH ADDTL 15 MIN

## 2020-08-25 PROCEDURE — 85610 PROTHROMBIN TIME: CPT | Performed by: RADIOLOGY

## 2020-08-25 PROCEDURE — 25000128 H RX IP 250 OP 636: Performed by: NURSE ANESTHETIST, CERTIFIED REGISTERED

## 2020-08-25 PROCEDURE — 36415 COLL VENOUS BLD VENIPUNCTURE: CPT | Performed by: RADIOLOGY

## 2020-08-25 PROCEDURE — 25000128 H RX IP 250 OP 636: Performed by: RADIOLOGY

## 2020-08-25 PROCEDURE — 85027 COMPLETE CBC AUTOMATED: CPT | Performed by: RADIOLOGY

## 2020-08-25 RX ORDER — SODIUM CHLORIDE, SODIUM LACTATE, POTASSIUM CHLORIDE, CALCIUM CHLORIDE 600; 310; 30; 20 MG/100ML; MG/100ML; MG/100ML; MG/100ML
INJECTION, SOLUTION INTRAVENOUS CONTINUOUS PRN
Status: DISCONTINUED | OUTPATIENT
Start: 2020-08-25 | End: 2020-08-25

## 2020-08-25 RX ORDER — DULOXETIN HYDROCHLORIDE 60 MG/1
60 CAPSULE, DELAYED RELEASE ORAL
COMMUNITY
Start: 2020-07-21 | End: 2020-12-11

## 2020-08-25 RX ORDER — ONDANSETRON 2 MG/ML
INJECTION INTRAMUSCULAR; INTRAVENOUS PRN
Status: DISCONTINUED | OUTPATIENT
Start: 2020-08-25 | End: 2020-08-25

## 2020-08-25 RX ORDER — TOPIRAMATE 50 MG/1
100 TABLET, FILM COATED ORAL 2 TIMES DAILY
COMMUNITY
Start: 2020-06-14

## 2020-08-25 RX ORDER — SODIUM TETRADECYL SULFATE 30 MG/ML
12 INJECTION, SOLUTION INTRAVENOUS
Status: COMPLETED | OUTPATIENT
Start: 2020-08-25 | End: 2020-08-25

## 2020-08-25 RX ORDER — GABAPENTIN 300 MG/1
2 CAPSULE ORAL DAILY
COMMUNITY
Start: 2020-03-20

## 2020-08-25 RX ORDER — LIDOCAINE 40 MG/G
CREAM TOPICAL
Status: DISCONTINUED | OUTPATIENT
Start: 2020-08-25 | End: 2020-08-25 | Stop reason: HOSPADM

## 2020-08-25 RX ORDER — OXYCODONE HYDROCHLORIDE 5 MG/1
5 TABLET ORAL EVERY 4 HOURS PRN
Status: DISCONTINUED | OUTPATIENT
Start: 2020-08-25 | End: 2020-08-25 | Stop reason: HOSPADM

## 2020-08-25 RX ORDER — MECLIZINE HYDROCHLORIDE 25 MG/1
25 TABLET ORAL PRN
COMMUNITY
Start: 2020-02-27

## 2020-08-25 RX ORDER — DEXAMETHASONE SODIUM PHOSPHATE 4 MG/ML
INJECTION, SOLUTION INTRA-ARTICULAR; INTRALESIONAL; INTRAMUSCULAR; INTRAVENOUS; SOFT TISSUE PRN
Status: DISCONTINUED | OUTPATIENT
Start: 2020-08-25 | End: 2020-08-25

## 2020-08-25 RX ORDER — SODIUM TETRADECYL SULFATE 30 MG/ML
12 INJECTION, SOLUTION INTRAVENOUS
Status: CANCELLED | OUTPATIENT
Start: 2020-08-25

## 2020-08-25 RX ORDER — LIDOCAINE HYDROCHLORIDE 20 MG/ML
INJECTION, SOLUTION INFILTRATION; PERINEURAL PRN
Status: DISCONTINUED | OUTPATIENT
Start: 2020-08-25 | End: 2020-08-25

## 2020-08-25 RX ORDER — SUCRALFATE 1 G/1
TABLET ORAL 3 TIMES DAILY
COMMUNITY
Start: 2020-06-14

## 2020-08-25 RX ORDER — SODIUM CHLORIDE, SODIUM LACTATE, POTASSIUM CHLORIDE, CALCIUM CHLORIDE 600; 310; 30; 20 MG/100ML; MG/100ML; MG/100ML; MG/100ML
INJECTION, SOLUTION INTRAVENOUS CONTINUOUS
Status: DISCONTINUED | OUTPATIENT
Start: 2020-08-25 | End: 2020-08-25 | Stop reason: HOSPADM

## 2020-08-25 RX ORDER — EPHEDRINE SULFATE 50 MG/ML
INJECTION, SOLUTION INTRAMUSCULAR; INTRAVENOUS; SUBCUTANEOUS PRN
Status: DISCONTINUED | OUTPATIENT
Start: 2020-08-25 | End: 2020-08-25

## 2020-08-25 RX ORDER — ONDANSETRON 4 MG/1
4 TABLET, ORALLY DISINTEGRATING ORAL EVERY 30 MIN PRN
Status: DISCONTINUED | OUTPATIENT
Start: 2020-08-25 | End: 2020-08-25 | Stop reason: HOSPADM

## 2020-08-25 RX ORDER — MEPERIDINE HYDROCHLORIDE 25 MG/ML
12.5 INJECTION INTRAMUSCULAR; INTRAVENOUS; SUBCUTANEOUS
Status: DISCONTINUED | OUTPATIENT
Start: 2020-08-25 | End: 2020-08-25 | Stop reason: HOSPADM

## 2020-08-25 RX ORDER — FENTANYL CITRATE 50 UG/ML
INJECTION, SOLUTION INTRAMUSCULAR; INTRAVENOUS PRN
Status: DISCONTINUED | OUTPATIENT
Start: 2020-08-25 | End: 2020-08-25

## 2020-08-25 RX ORDER — OXYCODONE HYDROCHLORIDE 5 MG/1
10 TABLET ORAL EVERY 4 HOURS PRN
Qty: 20 TABLET | Refills: 0 | Status: ON HOLD | OUTPATIENT
Start: 2020-08-25 | End: 2020-09-25

## 2020-08-25 RX ORDER — PROPOFOL 10 MG/ML
INJECTION, EMULSION INTRAVENOUS PRN
Status: DISCONTINUED | OUTPATIENT
Start: 2020-08-25 | End: 2020-08-25

## 2020-08-25 RX ORDER — LIDOCAINE HYDROCHLORIDE 40 MG/ML
SOLUTION TOPICAL PRN
Status: DISCONTINUED | OUTPATIENT
Start: 2020-08-25 | End: 2020-08-25

## 2020-08-25 RX ORDER — OXYCODONE HYDROCHLORIDE 5 MG/1
10 TABLET ORAL EVERY 4 HOURS PRN
Status: DISCONTINUED | OUTPATIENT
Start: 2020-08-25 | End: 2020-08-25 | Stop reason: HOSPADM

## 2020-08-25 RX ORDER — MONTELUKAST SODIUM 10 MG/1
20 TABLET ORAL
COMMUNITY
Start: 2019-10-30 | End: 2020-12-11

## 2020-08-25 RX ORDER — NALOXONE HYDROCHLORIDE 0.4 MG/ML
.1-.4 INJECTION, SOLUTION INTRAMUSCULAR; INTRAVENOUS; SUBCUTANEOUS
Status: DISCONTINUED | OUTPATIENT
Start: 2020-08-25 | End: 2020-08-25 | Stop reason: HOSPADM

## 2020-08-25 RX ORDER — ACETAMINOPHEN 325 MG/1
975 TABLET ORAL ONCE
Status: DISCONTINUED | OUTPATIENT
Start: 2020-08-25 | End: 2020-08-25 | Stop reason: HOSPADM

## 2020-08-25 RX ORDER — DULOXETIN HYDROCHLORIDE 20 MG/1
20 CAPSULE, DELAYED RELEASE ORAL DAILY
COMMUNITY
Start: 2020-07-21 | End: 2021-05-07

## 2020-08-25 RX ORDER — PROPOFOL 10 MG/ML
INJECTION, EMULSION INTRAVENOUS CONTINUOUS PRN
Status: DISCONTINUED | OUTPATIENT
Start: 2020-08-25 | End: 2020-08-25

## 2020-08-25 RX ORDER — KETOROLAC TROMETHAMINE 30 MG/ML
INJECTION, SOLUTION INTRAMUSCULAR; INTRAVENOUS PRN
Status: DISCONTINUED | OUTPATIENT
Start: 2020-08-25 | End: 2020-08-25

## 2020-08-25 RX ORDER — ONDANSETRON 2 MG/ML
4 INJECTION INTRAMUSCULAR; INTRAVENOUS EVERY 30 MIN PRN
Status: DISCONTINUED | OUTPATIENT
Start: 2020-08-25 | End: 2020-08-25 | Stop reason: HOSPADM

## 2020-08-25 RX ORDER — FENTANYL CITRATE 50 UG/ML
25-50 INJECTION, SOLUTION INTRAMUSCULAR; INTRAVENOUS EVERY 5 MIN PRN
Status: DISCONTINUED | OUTPATIENT
Start: 2020-08-25 | End: 2020-08-25 | Stop reason: HOSPADM

## 2020-08-25 RX ORDER — ONDANSETRON 4 MG/1
4 TABLET, FILM COATED ORAL EVERY 6 HOURS PRN
COMMUNITY
Start: 2020-05-22 | End: 2020-12-11

## 2020-08-25 RX ORDER — CEFAZOLIN SODIUM 2 G/100ML
2 INJECTION, SOLUTION INTRAVENOUS ONCE
Status: COMPLETED | OUTPATIENT
Start: 2020-08-25 | End: 2020-08-25

## 2020-08-25 RX ORDER — PANTOPRAZOLE SODIUM 40 MG/1
1 TABLET, DELAYED RELEASE ORAL DAILY
COMMUNITY
Start: 2020-06-14

## 2020-08-25 RX ORDER — BUSPIRONE HYDROCHLORIDE 15 MG/1
1 TABLET ORAL 3 TIMES DAILY
COMMUNITY
Start: 2020-06-14

## 2020-08-25 RX ADMIN — FENTANYL CITRATE 50 MCG: 50 INJECTION, SOLUTION INTRAMUSCULAR; INTRAVENOUS at 13:41

## 2020-08-25 RX ADMIN — HYDROMORPHONE HYDROCHLORIDE 0.5 MG: 1 INJECTION, SOLUTION INTRAMUSCULAR; INTRAVENOUS; SUBCUTANEOUS at 15:02

## 2020-08-25 RX ADMIN — FENTANYL CITRATE 50 MCG: 50 INJECTION, SOLUTION INTRAMUSCULAR; INTRAVENOUS at 15:45

## 2020-08-25 RX ADMIN — OXYCODONE HYDROCHLORIDE 10 MG: 5 TABLET ORAL at 16:20

## 2020-08-25 RX ADMIN — PROPOFOL 300 MG: 10 INJECTION, EMULSION INTRAVENOUS at 13:23

## 2020-08-25 RX ADMIN — ONDANSETRON 4 MG: 2 INJECTION INTRAMUSCULAR; INTRAVENOUS at 14:53

## 2020-08-25 RX ADMIN — DEXAMETHASONE SODIUM PHOSPHATE 8 MG: 4 INJECTION, SOLUTION INTRAMUSCULAR; INTRAVENOUS at 13:35

## 2020-08-25 RX ADMIN — FENTANYL CITRATE 50 MCG: 50 INJECTION, SOLUTION INTRAMUSCULAR; INTRAVENOUS at 14:21

## 2020-08-25 RX ADMIN — SODIUM CHLORIDE, POTASSIUM CHLORIDE, SODIUM LACTATE AND CALCIUM CHLORIDE: 600; 310; 30; 20 INJECTION, SOLUTION INTRAVENOUS at 13:20

## 2020-08-25 RX ADMIN — PHENYLEPHRINE HYDROCHLORIDE 100 MCG: 10 INJECTION INTRAVENOUS at 13:48

## 2020-08-25 RX ADMIN — CEFAZOLIN 2 G: 10 INJECTION, POWDER, FOR SOLUTION INTRAVENOUS at 13:29

## 2020-08-25 RX ADMIN — FENTANYL CITRATE 50 MCG: 50 INJECTION, SOLUTION INTRAMUSCULAR; INTRAVENOUS at 14:27

## 2020-08-25 RX ADMIN — Medication 5 MG: at 13:58

## 2020-08-25 RX ADMIN — TETRADECYL HYDROGEN SULFATE (ESTER) 4 ML: 30 INJECTION, SOLUTION INTRAVENOUS at 14:53

## 2020-08-25 RX ADMIN — PROPOFOL 25 MCG/KG/MIN: 10 INJECTION, EMULSION INTRAVENOUS at 13:31

## 2020-08-25 RX ADMIN — HYDROMORPHONE HYDROCHLORIDE 0.5 MG: 1 INJECTION, SOLUTION INTRAMUSCULAR; INTRAVENOUS; SUBCUTANEOUS at 15:04

## 2020-08-25 RX ADMIN — FENTANYL CITRATE 50 MCG: 50 INJECTION, SOLUTION INTRAMUSCULAR; INTRAVENOUS at 13:38

## 2020-08-25 RX ADMIN — LIDOCAINE HYDROCHLORIDE 120 ML: 40 SOLUTION TOPICAL at 13:27

## 2020-08-25 RX ADMIN — ROCURONIUM BROMIDE 5 MG: 10 INJECTION INTRAVENOUS at 13:22

## 2020-08-25 RX ADMIN — LIDOCAINE HYDROCHLORIDE 100 MG: 20 INJECTION, SOLUTION INFILTRATION; PERINEURAL at 13:23

## 2020-08-25 RX ADMIN — MIDAZOLAM 2 MG: 1 INJECTION INTRAMUSCULAR; INTRAVENOUS at 13:15

## 2020-08-25 RX ADMIN — KETOROLAC TROMETHAMINE 30 MG: 30 INJECTION, SOLUTION INTRAMUSCULAR at 14:54

## 2020-08-25 RX ADMIN — FENTANYL CITRATE 50 MCG: 50 INJECTION, SOLUTION INTRAMUSCULAR; INTRAVENOUS at 15:20

## 2020-08-25 RX ADMIN — Medication 5 MG: at 14:09

## 2020-08-25 ASSESSMENT — MIFFLIN-ST. JEOR: SCORE: 1805.88

## 2020-08-25 ASSESSMENT — LIFESTYLE VARIABLES: TOBACCO_USE: 1

## 2020-08-25 NOTE — PROCEDURES
Interventional Radiology Brief Post Procedure Note    Procedure: sclerotherapy right arm vascular malformation    Proceduralist: Roseline Marcial MD    Assistant: None    Time Out: Prior to the start of the procedure and with procedural staff participation, I verbally confirmed the patient s identity using two indicators, relevant allergies, that the procedure was appropriate and matched the consent or emergent situation, and that the correct equipment/implants were available. Immediately prior to starting the procedure I conducted the Time Out with the procedural staff and re-confirmed the patient s name, procedure, and site/side. (The Joint Commission universal protocol was followed.)  Yes        Sedation: General Endotracheal Anesthesia (GET) administered and documented by Anesthesia Care Provider    Findings:   6 Ml of 3% sotradecol to upper arm, small amount to left palmar forearm    Estimated Blood Loss: Minimal    Fluoroscopy Time:  minute(s)    SPECIMENS: None    Complications: 1. None     Condition: Stable    Plan:   Serial treatments, q 4 weeks, next will include bleomycin  If no significant improvement after series of serial treatments, will see in C for consultation for systemic/topical therapy, surgery    Comments: See dictated procedure note for full details.    Roseline Marcial MD

## 2020-08-25 NOTE — DISCHARGE INSTRUCTIONS
Same-Day Surgery   Adult Discharge Orders & Instructions     For 24 hours after surgery:  1. Get plenty of rest.  A responsible adult must stay with you for at least 24 hours after you leave the hospital.   2. Pain medication can slow your reflexes. Do not drive or use heavy equipment.  If you have weakness or tingling, don't drive or use heavy equipment until this feeling goes away.  3. Mixing alcohol and pain medication can cause dizziness and slow your breathing. It can even be fatal. Do not drink alcohol while taking pain medication.  4. Avoid strenuous or risky activities.  Ask for help when climbing stairs.   5. You may feel lightheaded.  If so, sit for a few minutes before standing.  Have someone help you get up.   6. If you have nausea (feel sick to your stomach), drink only clear liquids such as apple juice, ginger ale, broth or 7-Up.  Rest may also help.  Be sure to drink enough fluids.  Move to a regular diet as you feel able. Take pain medications with a small amount of solid food, such as toast or crackers, to avoid nausea.   7. A slight fever is normal. Call the doctor if your fever is over 100 F (37.7 C) (taken under the tongue) or lasts longer than 24 hours.  8. You may have a dry mouth, muscle aches, trouble sleeping or a sore throat.  These symptoms should go away after 24 hours.  9. Do not make important or legal decisions.   Pain Management:      1. Take pain medication (if prescribed) for pain as directed by your physician.        2. WARNING: If the pain medication you have been prescribed contains Tylenol  (acetaminophen), DO NOT take additional doses of Tylenol (acetaminophen).     Call your doctor for any of the followin.  Signs of infection (fever, growing tenderness at the surgery site, severe pain, a large amount of drainage or bleeding, foul-smelling drainage, redness, swelling).    2.  It has been over 8 to 10 hours since surgery and you are still not able to urinate (pee).    3.   Headache for over 24 hours.    4.  Numbness, tingling or weakness the day after surgery (if you had spinal anesthesia).  To contact a doctor, call _____________________________________ or:      [phone] and ask for the Resident On Call for:          __________________________________________ (answered 24 hours a day)      Emergency Department:  [facility]: [phone]  [facility]: [phone]               Rev. 10/2014

## 2020-08-25 NOTE — ANESTHESIA POSTPROCEDURE EVALUATION
Anesthesia POST Procedure Evaluation    Patient: Maritza Gallardo   MRN:     [id number] Gender:   female   Age:    25 year old :      1995        Preoperative Diagnosis: Venous malformation [Q27.9]   Procedure(s):  To I.R. for Right Upper Extremity Sclerothery @1300   Postop Comments: No value filed.     Anesthesia Type: General       Disposition: Outpatient   Postop Pain Control: Uneventful            Sign Out: Well controlled pain   PONV: No   Neuro/Psych: Uneventful            Sign Out: Acceptable/Baseline neuro status   Airway/Respiratory: Uneventful            Sign Out: Acceptable/Baseline resp. status   CV/Hemodynamics: Uneventful            Sign Out: Acceptable CV status   Other NRE: NONE   DID A NON-ROUTINE EVENT OCCUR? No         Last Anesthesia Record Vitals:  CRNA VITALS  2020 1437 - 2020 1537      2020             NIBP:  107/61    Pulse:  72    NIBP Mean:  88    Temp:  36.6  C (97.9  F)    SpO2:  96 %    Resp Rate (observed):  14          Last PACU Vitals:  Vitals Value Taken Time   /71 2020  3:45 PM   Temp 36.8  C (98.2  F) 2020  3:15 PM   Pulse 88 2020  3:49 PM   Resp 7 2020  3:49 PM   SpO2 96 % 2020  3:49 PM   Temp src     NIBP     Pulse     SpO2     Resp     Temp     Ht Rate     Temp 2     Vitals shown include unvalidated device data.      Electronically Signed By: Shelley Johnson MD, 2020, 3:50 PM

## 2020-08-25 NOTE — ANESTHESIA CARE TRANSFER NOTE
Patient: Maritza Gallardo    Procedure(s):  To I.R. for Right Upper Extremity Sclerothery @1300    Diagnosis: Venous malformation [Q27.9]  Diagnosis Additional Information: No value filed.    Anesthesia Type:   General     Note:  Airway :Face Mask  Patient transferred to:PACU  Handoff Report: Identifed the Patient, Identified the Reponsible Provider, Reviewed the pertinent medical history, Discussed the surgical course, Reviewed Intra-OP anesthesia mangement and issues during anesthesia, Set expectations for post-procedure period and Allowed opportunity for questions and acknowledgement of understanding      Vitals: (Last set prior to Anesthesia Care Transfer)    CRNA VITALS  8/25/2020 1437 - 8/25/2020 1518      8/25/2020             Pulse:  96    SpO2:  98 %                Electronically Signed By: MELODIE Davis CRNA  August 25, 2020  3:18 PM

## 2020-08-28 ENCOUNTER — TELEPHONE (OUTPATIENT)
Dept: VASCULAR SURGERY | Facility: CLINIC | Age: 25
End: 2020-08-28

## 2020-08-28 NOTE — TELEPHONE ENCOUNTER
I called and spoke with Maritza.  She is doing well denies pain. She is using her compression wrap on and off.  Pt scheduled for her next sclero with Dr Marcial on 9/25, she would like her Covid test order faxed to her [facility].  She will have them or she will contact me with the fax number.  For the 3rd treatment we will have to resubmit for another PA after 10/1.  All questions answered.  GERMAINE Archer RN, BSN  Interventional Radiology Nurse Coordinator   Phone:  [phone]

## 2020-09-24 ENCOUNTER — ANESTHESIA EVENT (OUTPATIENT)
Dept: CARDIOLOGY | Facility: CLINIC | Age: 25
End: 2020-09-24
Payer: COMMERCIAL

## 2020-09-24 RX ORDER — CEFAZOLIN SODIUM 2 G/100ML
2000 INJECTION, SOLUTION INTRAVENOUS ONCE
Status: CANCELLED | OUTPATIENT
Start: 2020-09-24 | End: 2020-09-24

## 2020-09-24 RX ORDER — LIDOCAINE 40 MG/G
CREAM TOPICAL
Status: CANCELLED | OUTPATIENT
Start: 2020-09-24

## 2020-09-24 RX ORDER — SODIUM TETRADECYL SULFATE 30 MG/ML
10 INJECTION, SOLUTION INTRAVENOUS
Status: CANCELLED | OUTPATIENT
Start: 2020-09-24

## 2020-09-24 ASSESSMENT — LIFESTYLE VARIABLES: TOBACCO_USE: 1

## 2020-09-25 ENCOUNTER — ANESTHESIA (OUTPATIENT)
Dept: CARDIOLOGY | Facility: CLINIC | Age: 25
End: 2020-09-25
Payer: COMMERCIAL

## 2020-09-25 ENCOUNTER — HOSPITAL ENCOUNTER (OUTPATIENT)
Facility: CLINIC | Age: 25
Discharge: HOME | End: 2020-09-25
Attending: RADIOLOGY | Admitting: RADIOLOGY
Payer: COMMERCIAL

## 2020-09-25 ENCOUNTER — HOSPITAL ENCOUNTER (OUTPATIENT)
Dept: INTERVENTIONAL RADIOLOGY/VASCULAR | Facility: CLINIC | Age: 25
End: 2020-09-25
Attending: RADIOLOGY | Admitting: RADIOLOGY
Payer: COMMERCIAL

## 2020-09-25 VITALS
RESPIRATION RATE: 12 BRPM | DIASTOLIC BLOOD PRESSURE: 56 MMHG | WEIGHT: 231.04 LBS | HEIGHT: 65 IN | HEART RATE: 81 BPM | BODY MASS INDEX: 38.49 KG/M2 | SYSTOLIC BLOOD PRESSURE: 105 MMHG | OXYGEN SATURATION: 96 % | TEMPERATURE: 97.9 F

## 2020-09-25 DIAGNOSIS — Q27.9 VASCULAR MALFORMATION: Primary | ICD-10-CM

## 2020-09-25 DIAGNOSIS — Q27.9 VASCULAR MALFORMATION: ICD-10-CM

## 2020-09-25 PROBLEM — G43.809 MIGRAINE VARIANT: Status: ACTIVE | Noted: 2020-04-30

## 2020-09-25 PROBLEM — R19.7 DIARRHEA: Status: ACTIVE | Noted: 2020-02-03

## 2020-09-25 PROBLEM — F41.1 GENERALIZED ANXIETY DISORDER: Status: ACTIVE | Noted: 2020-04-30

## 2020-09-25 PROBLEM — R10.30 LOWER ABDOMINAL PAIN: Status: ACTIVE | Noted: 2020-02-03

## 2020-09-25 PROBLEM — K92.0 COFFEE GROUND EMESIS: Status: ACTIVE | Noted: 2020-02-03

## 2020-09-25 PROBLEM — L05.91 PILONIDAL CYST: Status: ACTIVE | Noted: 2020-09-25

## 2020-09-25 PROBLEM — G89.4 CHRONIC PAIN DISORDER: Status: ACTIVE | Noted: 2020-04-30

## 2020-09-25 PROBLEM — F32.1 CURRENT MODERATE EPISODE OF MAJOR DEPRESSIVE DISORDER (H): Status: ACTIVE | Noted: 2020-04-30

## 2020-09-25 PROBLEM — R11.2 NON-INTRACTABLE VOMITING WITH NAUSEA: Status: ACTIVE | Noted: 2020-02-03

## 2020-09-25 PROBLEM — E66.9 OBESITY (BMI 35.0-39.9 WITHOUT COMORBIDITY): Status: ACTIVE | Noted: 2019-11-22

## 2020-09-25 PROBLEM — R19.5 MUCUS IN STOOL: Status: ACTIVE | Noted: 2020-02-03

## 2020-09-25 PROBLEM — K21.9 GERD (GASTROESOPHAGEAL REFLUX DISEASE): Status: ACTIVE | Noted: 2020-02-17

## 2020-09-25 PROBLEM — K92.0 HEMATEMESIS: Status: ACTIVE | Noted: 2020-02-03

## 2020-09-25 PROBLEM — G47.09 OTHER INSOMNIA: Status: ACTIVE | Noted: 2019-11-22

## 2020-09-25 LAB
APTT PPP: 28 SEC (ref 22–37)
B-HCG SERPL-ACNC: <1 IU/L (ref 0–5)
ERYTHROCYTE [DISTWIDTH] IN BLOOD BY AUTOMATED COUNT: 12.2 % (ref 10–15)
GLUCOSE BLDC GLUCOMTR-MCNC: 102 MG/DL (ref 70–99)
HCT VFR BLD AUTO: 39.4 % (ref 35–47)
HGB BLD-MCNC: 13.4 G/DL (ref 11.7–15.7)
INR PPP: 1.04 (ref 0.86–1.14)
MCH RBC QN AUTO: 30.2 PG (ref 26.5–33)
MCHC RBC AUTO-ENTMCNC: 34 G/DL (ref 31.5–36.5)
MCV RBC AUTO: 89 FL (ref 78–100)
PLATELET # BLD AUTO: 234 10E9/L (ref 150–450)
RBC # BLD AUTO: 4.43 10E12/L (ref 3.8–5.2)
WBC # BLD AUTO: 9.7 10E9/L (ref 4–11)

## 2020-09-25 PROCEDURE — 84702 CHORIONIC GONADOTROPIN TEST: CPT | Mod: GZ | Performed by: RADIOLOGY

## 2020-09-25 PROCEDURE — 25000125 ZZHC RX 250: Performed by: RADIOLOGY

## 2020-09-25 PROCEDURE — 85730 THROMBOPLASTIN TIME PARTIAL: CPT | Performed by: RADIOLOGY

## 2020-09-25 PROCEDURE — 85610 PROTHROMBIN TIME: CPT | Performed by: RADIOLOGY

## 2020-09-25 PROCEDURE — 37241 VASC EMBOLIZE/OCCLUDE VENOUS: CPT

## 2020-09-25 PROCEDURE — 37000009 ZZH ANESTHESIA TECHNICAL FEE, EACH ADDTL 15 MIN

## 2020-09-25 PROCEDURE — 25000128 H RX IP 250 OP 636: Performed by: RADIOLOGY

## 2020-09-25 PROCEDURE — 25000128 H RX IP 250 OP 636: Performed by: NURSE ANESTHETIST, CERTIFIED REGISTERED

## 2020-09-25 PROCEDURE — 82962 GLUCOSE BLOOD TEST: CPT

## 2020-09-25 PROCEDURE — 25000128 H RX IP 250 OP 636: Performed by: ANESTHESIOLOGY

## 2020-09-25 PROCEDURE — 37000008 ZZH ANESTHESIA TECHNICAL FEE, 1ST 30 MIN

## 2020-09-25 PROCEDURE — 25000566 ZZH SEVOFLURANE, EA 15 MIN

## 2020-09-25 PROCEDURE — 25000132 ZZH RX MED GY IP 250 OP 250 PS 637: Performed by: ANESTHESIOLOGY

## 2020-09-25 PROCEDURE — 25000125 ZZHC RX 250: Performed by: NURSE ANESTHETIST, CERTIFIED REGISTERED

## 2020-09-25 PROCEDURE — 85027 COMPLETE CBC AUTOMATED: CPT | Performed by: RADIOLOGY

## 2020-09-25 PROCEDURE — 25800030 ZZH RX IP 258 OP 636: Performed by: NURSE ANESTHETIST, CERTIFIED REGISTERED

## 2020-09-25 RX ORDER — FENTANYL CITRATE 50 UG/ML
25-50 INJECTION, SOLUTION INTRAMUSCULAR; INTRAVENOUS EVERY 5 MIN PRN
Status: DISCONTINUED | OUTPATIENT
Start: 2020-09-25 | End: 2020-09-25 | Stop reason: HOSPADM

## 2020-09-25 RX ORDER — NALOXONE HYDROCHLORIDE 0.4 MG/ML
.1-.4 INJECTION, SOLUTION INTRAMUSCULAR; INTRAVENOUS; SUBCUTANEOUS
Status: DISCONTINUED | OUTPATIENT
Start: 2020-09-25 | End: 2020-09-25 | Stop reason: HOSPADM

## 2020-09-25 RX ORDER — LIDOCAINE HYDROCHLORIDE 20 MG/ML
INJECTION, SOLUTION INFILTRATION; PERINEURAL PRN
Status: DISCONTINUED | OUTPATIENT
Start: 2020-09-25 | End: 2020-09-25

## 2020-09-25 RX ORDER — ONDANSETRON 2 MG/ML
INJECTION INTRAMUSCULAR; INTRAVENOUS PRN
Status: DISCONTINUED | OUTPATIENT
Start: 2020-09-25 | End: 2020-09-25

## 2020-09-25 RX ORDER — FENTANYL CITRATE 50 UG/ML
INJECTION, SOLUTION INTRAMUSCULAR; INTRAVENOUS PRN
Status: DISCONTINUED | OUTPATIENT
Start: 2020-09-25 | End: 2020-09-25

## 2020-09-25 RX ORDER — SODIUM TETRADECYL SULFATE 30 MG/ML
10 INJECTION, SOLUTION INTRAVENOUS
Status: COMPLETED | OUTPATIENT
Start: 2020-09-25 | End: 2020-09-25

## 2020-09-25 RX ORDER — ONDANSETRON 2 MG/ML
4 INJECTION INTRAMUSCULAR; INTRAVENOUS EVERY 30 MIN PRN
Status: DISCONTINUED | OUTPATIENT
Start: 2020-09-25 | End: 2020-09-25 | Stop reason: HOSPADM

## 2020-09-25 RX ORDER — SODIUM CHLORIDE, SODIUM LACTATE, POTASSIUM CHLORIDE, CALCIUM CHLORIDE 600; 310; 30; 20 MG/100ML; MG/100ML; MG/100ML; MG/100ML
INJECTION, SOLUTION INTRAVENOUS CONTINUOUS PRN
Status: DISCONTINUED | OUTPATIENT
Start: 2020-09-25 | End: 2020-09-25

## 2020-09-25 RX ORDER — ONDANSETRON 4 MG/1
4 TABLET, ORALLY DISINTEGRATING ORAL EVERY 30 MIN PRN
Status: DISCONTINUED | OUTPATIENT
Start: 2020-09-25 | End: 2020-09-25 | Stop reason: HOSPADM

## 2020-09-25 RX ORDER — SODIUM CHLORIDE, SODIUM LACTATE, POTASSIUM CHLORIDE, CALCIUM CHLORIDE 600; 310; 30; 20 MG/100ML; MG/100ML; MG/100ML; MG/100ML
INJECTION, SOLUTION INTRAVENOUS CONTINUOUS
Status: DISCONTINUED | OUTPATIENT
Start: 2020-09-25 | End: 2020-09-25 | Stop reason: HOSPADM

## 2020-09-25 RX ORDER — DEXAMETHASONE SODIUM PHOSPHATE 4 MG/ML
INJECTION, SOLUTION INTRA-ARTICULAR; INTRALESIONAL; INTRAMUSCULAR; INTRAVENOUS; SOFT TISSUE PRN
Status: DISCONTINUED | OUTPATIENT
Start: 2020-09-25 | End: 2020-09-25

## 2020-09-25 RX ORDER — HYDROCODONE BITARTRATE AND ACETAMINOPHEN 10; 325 MG/1; MG/1
1 TABLET ORAL EVERY 6 HOURS PRN
Qty: 15 TABLET | Refills: 0 | Status: ON HOLD | OUTPATIENT
Start: 2020-09-25 | End: 2020-10-22

## 2020-09-25 RX ORDER — CEFAZOLIN SODIUM 2 G/100ML
2000 INJECTION, SOLUTION INTRAVENOUS ONCE
Status: DISCONTINUED | OUTPATIENT
Start: 2020-09-25 | End: 2020-09-25 | Stop reason: CLARIF

## 2020-09-25 RX ORDER — OXYCODONE HYDROCHLORIDE 5 MG/1
5 TABLET ORAL EVERY 4 HOURS PRN
Status: DISCONTINUED | OUTPATIENT
Start: 2020-09-25 | End: 2020-09-25 | Stop reason: HOSPADM

## 2020-09-25 RX ORDER — LIDOCAINE 40 MG/G
CREAM TOPICAL
Status: DISCONTINUED | OUTPATIENT
Start: 2020-09-25 | End: 2020-09-26 | Stop reason: HOSPADM

## 2020-09-25 RX ORDER — ACETAMINOPHEN 325 MG/1
975 TABLET ORAL ONCE
Status: COMPLETED | OUTPATIENT
Start: 2020-09-25 | End: 2020-09-25

## 2020-09-25 RX ORDER — KETOROLAC TROMETHAMINE 30 MG/ML
INJECTION, SOLUTION INTRAMUSCULAR; INTRAVENOUS PRN
Status: DISCONTINUED | OUTPATIENT
Start: 2020-09-25 | End: 2020-09-25

## 2020-09-25 RX ORDER — OXYCODONE HYDROCHLORIDE 5 MG/1
10 TABLET ORAL EVERY 4 HOURS PRN
Qty: 20 TABLET | Refills: 0 | Status: SHIPPED | OUTPATIENT
Start: 2020-09-25

## 2020-09-25 RX ORDER — MEPERIDINE HYDROCHLORIDE 25 MG/ML
12.5 INJECTION INTRAMUSCULAR; INTRAVENOUS; SUBCUTANEOUS
Status: DISCONTINUED | OUTPATIENT
Start: 2020-09-25 | End: 2020-09-25 | Stop reason: HOSPADM

## 2020-09-25 RX ORDER — HYDROMORPHONE HYDROCHLORIDE 1 MG/ML
.1-.5 INJECTION, SOLUTION INTRAMUSCULAR; INTRAVENOUS; SUBCUTANEOUS EVERY 10 MIN PRN
Status: COMPLETED | OUTPATIENT
Start: 2020-09-25 | End: 2020-09-25

## 2020-09-25 RX ORDER — ACETAMINOPHEN 325 MG/1
650 TABLET ORAL EVERY 4 HOURS PRN
Status: DISCONTINUED | OUTPATIENT
Start: 2020-09-25 | End: 2020-09-25 | Stop reason: HOSPADM

## 2020-09-25 RX ORDER — PROPOFOL 10 MG/ML
INJECTION, EMULSION INTRAVENOUS PRN
Status: DISCONTINUED | OUTPATIENT
Start: 2020-09-25 | End: 2020-09-25

## 2020-09-25 RX ORDER — IOPAMIDOL 612 MG/ML
15 INJECTION, SOLUTION INTRATHECAL ONCE
Status: DISCONTINUED | OUTPATIENT
Start: 2020-09-25 | End: 2020-09-26 | Stop reason: HOSPADM

## 2020-09-25 RX ADMIN — HYDROMORPHONE HYDROCHLORIDE 0.5 MG: 1 INJECTION, SOLUTION INTRAMUSCULAR; INTRAVENOUS; SUBCUTANEOUS at 15:37

## 2020-09-25 RX ADMIN — KETOROLAC TROMETHAMINE 30 MG: 30 INJECTION, SOLUTION INTRAMUSCULAR at 15:16

## 2020-09-25 RX ADMIN — HYDROMORPHONE HYDROCHLORIDE 0.5 MG: 1 INJECTION, SOLUTION INTRAMUSCULAR; INTRAVENOUS; SUBCUTANEOUS at 16:00

## 2020-09-25 RX ADMIN — Medication 100 MG: at 13:51

## 2020-09-25 RX ADMIN — ONDANSETRON 4 MG: 2 INJECTION INTRAMUSCULAR; INTRAVENOUS at 14:18

## 2020-09-25 RX ADMIN — FENTANYL CITRATE 50 MCG: 50 INJECTION, SOLUTION INTRAMUSCULAR; INTRAVENOUS at 14:21

## 2020-09-25 RX ADMIN — FENTANYL CITRATE 100 MCG: 50 INJECTION, SOLUTION INTRAMUSCULAR; INTRAVENOUS at 13:51

## 2020-09-25 RX ADMIN — HYDROMORPHONE HYDROCHLORIDE 0.5 MG: 1 INJECTION, SOLUTION INTRAMUSCULAR; INTRAVENOUS; SUBCUTANEOUS at 14:59

## 2020-09-25 RX ADMIN — SUGAMMADEX 200 MG: 100 INJECTION, SOLUTION INTRAVENOUS at 15:17

## 2020-09-25 RX ADMIN — DEXAMETHASONE SODIUM PHOSPHATE 8 MG: 4 INJECTION, SOLUTION INTRAMUSCULAR; INTRAVENOUS at 14:18

## 2020-09-25 RX ADMIN — Medication 1 TABLET: at 17:14

## 2020-09-25 RX ADMIN — MIDAZOLAM 2 MG: 1 INJECTION INTRAMUSCULAR; INTRAVENOUS at 13:51

## 2020-09-25 RX ADMIN — TETRADECYL HYDROGEN SULFATE (ESTER) 5 ML: 30 INJECTION, SOLUTION INTRAVENOUS at 15:16

## 2020-09-25 RX ADMIN — HYDROMORPHONE HYDROCHLORIDE 0.5 MG: 1 INJECTION, SOLUTION INTRAMUSCULAR; INTRAVENOUS; SUBCUTANEOUS at 16:27

## 2020-09-25 RX ADMIN — LIDOCAINE HYDROCHLORIDE 80 MG: 20 INJECTION, SOLUTION INFILTRATION; PERINEURAL at 13:51

## 2020-09-25 RX ADMIN — ACETAMINOPHEN 975 MG: 325 TABLET, FILM COATED ORAL at 16:15

## 2020-09-25 RX ADMIN — PROPOFOL 200 MG: 10 INJECTION, EMULSION INTRAVENOUS at 13:51

## 2020-09-25 RX ADMIN — BLEOMYCIN 12 UNITS: 15 INJECTION, POWDER, LYOPHILIZED, FOR SOLUTION INTRAMUSCULAR; INTRAPLEURAL; INTRAVENOUS; SUBCUTANEOUS at 15:10

## 2020-09-25 RX ADMIN — FENTANYL CITRATE 50 MCG: 50 INJECTION, SOLUTION INTRAMUSCULAR; INTRAVENOUS at 15:44

## 2020-09-25 RX ADMIN — CEFAZOLIN 2 G: 10 INJECTION, POWDER, FOR SOLUTION INTRAVENOUS at 14:04

## 2020-09-25 RX ADMIN — ROCURONIUM BROMIDE 30 MG: 10 INJECTION INTRAVENOUS at 13:56

## 2020-09-25 RX ADMIN — SODIUM CHLORIDE, POTASSIUM CHLORIDE, SODIUM LACTATE AND CALCIUM CHLORIDE: 600; 310; 30; 20 INJECTION, SOLUTION INTRAVENOUS at 13:51

## 2020-09-25 ASSESSMENT — MIFFLIN-ST. JEOR: SCORE: 1798.88

## 2020-09-25 NOTE — ANESTHESIA POSTPROCEDURE EVALUATION
Anesthesia POST Procedure Evaluation    Patient: Maritza Gallardo   MRN:     [id number] Gender:   female   Age:    25 year old :      1995        Preoperative Diagnosis: Venous malformation [Q27.9]   Procedure(s):  Right Upper Extremity Sclerotherapy @1300   Postop Comments: No value filed.     Anesthesia Type: No value filed.       Disposition: Outpatient   Postop Pain Control: Uneventful            Sign Out: Well controlled pain   PONV: No   Neuro/Psych: Uneventful            Sign Out: Acceptable/Baseline neuro status   Airway/Respiratory: Uneventful            Sign Out: Acceptable/Baseline resp. status   CV/Hemodynamics: Uneventful            Sign Out: Acceptable CV status   Other NRE: NONE   DID A NON-ROUTINE EVENT OCCUR? No    Event details/Postop Comments:  I personally evaluated the patient at bedside. No anesthesia-related complications noted. Patient is hemodynamically stable with adequate control of pain and nausea. Ready for discharge from PACU. All questions were answered.    Esequiel Greer MD  Pediatric Staff Anesthesiologist  [facility]  Pager [phone]  Phone [phone]          Last Anesthesia Record Vitals:  CRNA VITALS  2020 1502 - 2020 1602      2020             EKG:  Sinus rhythm          Last PACU Vitals:  Vitals Value Taken Time   /57 2020  3:39 PM   Temp 36.9  C (98.4  F) 2020  3:39 PM   Pulse 74 2020  4:00 PM   Resp 25 2020  4:01 PM   SpO2 95 % 2020  4:43 PM   Temp src     NIBP     Pulse     SpO2     Resp     Temp     Ht Rate     Temp 2     Vitals shown include unvalidated device data.      Electronically Signed By: Esequiel Greer MD, 2020, 4:43 PM

## 2020-09-25 NOTE — ANESTHESIA PREPROCEDURE EVALUATION
"Anesthesia Pre-Procedure Evaluation    Patient: Maritza Gallardo   MRN:     [id number] Gender:   female   Age:    25 year old :      1995        Preoperative Diagnosis: Venous malformation [Q27.9]   Procedure(s):  Right Upper Extremity Sclerotherapy @1300     LABS:  CBC:   Lab Results   Component Value Date    WBC 10.8 2020    WBC 9.1 10/09/2017    HGB 14.8 2020    HGB 13.9 10/09/2017    HCT 43.1 2020    HCT 40.0 10/09/2017     2020     10/09/2017     BMP:   Lab Results   Component Value Date    GLC 84 2020     COAGS:   Lab Results   Component Value Date    PTT 28 2020    INR 0.98 2020    FIBR 401 2017     POC:   Lab Results   Component Value Date    BGM 87 10/09/2017    HCG Negative 10/09/2017    HCGS Negative 2017     OTHER: No results found for: PH, LACT, A1C, HELENE, PHOS, MAG, ALBUMIN, PROTTOTAL, ALT, AST, GGT, ALKPHOS, BILITOTAL, BILIDIRECT, LIPASE, AMYLASE, NEMO, TSH, T4, T3, CRP, SED     Preop Vitals    BP Readings from Last 3 Encounters:   20 112/67   10/09/17 129/63   17 116/74    Pulse Readings from Last 3 Encounters:   20 64   17 74   17 76      Resp Readings from Last 3 Encounters:   20 12   10/09/17 24   17 16    SpO2 Readings from Last 3 Encounters:   20 98%   10/09/17 98%   17 98%      Temp Readings from Last 1 Encounters:   20 36.8  C (98.2  F) (Axillary)    Ht Readings from Last 1 Encounters:   20 1.651 m (5' 5\")      Wt Readings from Last 1 Encounters:   20 105.5 kg (232 lb 9.4 oz)    Estimated body mass index is 38.7 kg/m  as calculated from the following:    Height as of 20: 1.651 m (5' 5\").    Weight as of 20: 105.5 kg (232 lb 9.4 oz).     LDA:  Peripheral IV 20 Left Lower forearm (Active)   Number of days: 143        Past Medical History:   Diagnosis Date     Amenorrhea      Chronic low back pain      Gastroesophageal reflux disease      " Sleep apnea      Venous malformation     right arm      Past Surgical History:   Procedure Laterality Date     IR VEIN SPIDER NON FACE SCLEROTHERAPY  8/25/2020     SCLEROTHERAPY      right arm, chest     Sedated MRI      age 6      Allergies   Allergen Reactions     Adhesive Tape Blisters     Seasonal Allergies         Anesthesia Evaluation     . Pt has had prior anesthetic. Type: General and MAC    No history of anesthetic complications          ROS/MED HX    ENT/Pulmonary:     (+)sleep apnea, tobacco use, Current use uses CPAP , . .    Neurologic:  - neg neurologic ROS     Cardiovascular:  - neg cardiovascular ROS       METS/Exercise Tolerance:  4 - Raking leaves, gardening   Hematologic:         Musculoskeletal:   (+)  other musculoskeletal- Low back pain      GI/Hepatic:     (+) GERD Asymptomatic on medication,       Renal/Genitourinary:     (+) Other Renal/ Genitourinary, Amenorrhea       Endo:     (+) Obesity, .      Psychiatric:     (+) psychiatric history depression      Infectious Disease:  - neg infectious disease ROS       Malignancy:      - no malignancy   Other:    - neg other ROS                     PHYSICAL EXAM:   Mental Status/Neuro: A/A/O   Airway: Facies: Thick Neck  Mallampati: I  Mouth/Opening: Full  TM distance: > 6 cm  Neck ROM: Full   Respiratory: Auscultation: CTAB     Resp. Rate: Normal     Resp. Effort: Normal      CV: Rhythm: Regular  Rate: Age appropriate  Heart: Normal Sounds  Edema: None   Comments:      Dental: Normal Dentition Habitus: Obesity; Morbid               Assessment:   ASA SCORE: 3    H&P: History and physical reviewed and following examination; no interval change.    NPO Status: NPO Appropriate     Plan:   Anes. Type:  General   Pre-Medication: None   Induction:  IV (Standard)   Airway: ETT; Oral; CMAC/VL   Access/Monitoring: PIV   Maintenance: Balanced     Postop Plan:   Postop Pain: Opioids  Postop Sedation/Airway: Not planned  Disposition: Outpatient     PONV  Management:   Adult Risk Factors: Female, Postop Opioids   Prevention: Ondansetron, Dexamethasone     CONSENT: Direct conversation   Plan and risks discussed with: Patient   Blood Products: Consent Deferred (Minimal Blood Loss)       Comments for Plan/Consent:  - PIV  - GA with ETT  - Maintenance: balanced  - Analgesia: fentanyl, hydromorphone, ketorolac  - PONV prophylaxis: ondansetron, dexamethasone  - care per IR bleomycin protocol    Risks and benefits of anesthetic approach, including but not limited to sore throat, hoarseness, mucosal injury, dental injury, bronchospasm/laryngospasm, PONV, aspiration, injury to blood vessels and/ or nerves, hemodynamic and respiratory issues including potential long term consequences, bleeding, side effects of blood transfusion and postoperative delirium were discussed with parents and all questions were answered.    Esequiel Greer MD  Pediatric Staff Anesthesiologist  [facility]  Pager [phone]  Phone [phone]                  Esequiel Greer MD

## 2020-09-25 NOTE — ANESTHESIA CARE TRANSFER NOTE
Patient: Maritza Gallardo    Procedure(s):  Right Upper Extremity Sclerotherapy @1300    Diagnosis: Venous malformation [Q27.9]  Diagnosis Additional Information: No value filed.    Anesthesia Type:   No value filed.     Note:  Airway :Face Mask  Patient transferred to:PACU  Handoff Report: Identifed the Patient, Identified the Reponsible Provider, Reviewed the pertinent medical history, Discussed the surgical course, Reviewed Intra-OP anesthesia mangement and issues during anesthesia, Set expectations for post-procedure period and Allowed opportunity for questions and acknowledgement of understanding      Vitals: (Last set prior to Anesthesia Care Transfer)    CRNA VITALS  9/25/2020 1502 - 9/25/2020 1545      9/25/2020             EKG:  Sinus rhythm                Electronically Signed By: MELODIE Bruce CRNA  September 25, 2020  3:45 PM

## 2020-09-25 NOTE — PROCEDURES
[facility], [place]    Procedure: IR Procedure Note    Date/Time: 9/25/2020 3:29 PM  Performed by: Roseline Marcial MD  Authorized by: Roseline Marcial MD     UNIVERSAL PROTOCOL   Site Marked: NA  Prior Images Obtained and Reviewed:  Yes  Required items: Required blood products, implants, devices and special equipment available    Patient identity confirmed:  Verbally with patient, arm band, provided demographic data and hospital-assigned identification number  Patient was reevaluated immediately before administering moderate or deep sedation or anesthesia  Confirmation Checklist:  Patient's identity using two indicators, relevant allergies, procedure was appropriate and matched the consent or emergent situation and correct equipment/implants were available  Time out: Immediately prior to the procedure a time out was called    Universal Protocol: the Joint Commission Universal Protocol was followed    Preparation: Patient was prepped and draped in usual sterile fashion    ESBL (mL):  3         ANESTHESIA    Anesthetic Total (mL): 0      SEDATION    Patient Sedated: Yes    Sedation Type:  Deep  Sedation:  See MAR for details  Vital signs: Vital signs monitored during sedation    See dictated procedure note for full details.  Findings: GET,ee anesthesia record    Specimens: none    Complications: None    Condition: Stable    Plan: Routine post-procedure cares  Additional sclerotherapy with bleomycin and sotradecol in 4-6 weeks    PROCEDURE   Patient Tolerance:  Patient tolerated the procedure well with no immediate complications  Describe Procedure: 12 units bleomycin  5 mL of 3% sotradecol  Length of time physician/provider present for 1:1 monitoring during sedation: 0

## 2020-09-25 NOTE — DISCHARGE INSTRUCTIONS
Same-Day Surgery   Adult Discharge Orders & Instructions     For 24 hours after surgery:  1. Get plenty of rest.  A responsible adult must stay with you for at least 24 hours after you leave the hospital.   2. Pain medication can slow your reflexes. Do not drive or use heavy equipment.  If you have weakness or tingling, don't drive or use heavy equipment until this feeling goes away.  3. Mixing alcohol and pain medication can cause dizziness and slow your breathing. It can even be fatal. Do not drink alcohol while taking pain medication.  4. Avoid strenuous or risky activities.  Ask for help when climbing stairs.   5. You may feel lightheaded.  If so, sit for a few minutes before standing.  Have someone help you get up.   6. If you have nausea (feel sick to your stomach), drink only clear liquids such as apple juice, ginger ale, broth or 7-Up.  Rest may also help.  Be sure to drink enough fluids.  Move to a regular diet as you feel able. Take pain medications with a small amount of solid food, such as toast or crackers, to avoid nausea.   7. A slight fever is normal. Call the doctor if your fever is over 100 F (37.7 C) (taken under the tongue) or lasts longer than 24 hours.  8. You may have a dry mouth, muscle aches, trouble sleeping or a sore throat.  These symptoms should go away after 24 hours.  9. Do not make important or legal decisions.   Pain Management:      1. Take pain medication (if prescribed) for pain as directed by your physician.        2. WARNING: If the pain medication you have been prescribed contains Tylenol  (acetaminophen), DO NOT take additional doses of Tylenol (acetaminophen).     Call your doctor for any of the followin.  Signs of infection (fever, growing tenderness at the surgery site, severe pain, a large amount of drainage or bleeding, foul-smelling drainage, redness, swelling).    2.  It has been over 8 to 10 hours since surgery and you are still not able to urinate (pee).    3.   Headache for over 24 hours.    4.  Numbness, tingling or weakness the day after surgery (if you had spinal anesthesia).  To contact a doctor, call _____________________________________ or:      [phone] and ask for the Resident On Call for:          __________________________________________ (answered 24 hours a day)      Emergency Department:  [facility]: [phone]  [facility]: [phone]               Rev. 10/2014

## 2020-09-29 ENCOUNTER — TELEPHONE (OUTPATIENT)
Dept: RADIOLOGY | Facility: CLINIC | Age: 25
End: 2020-09-29

## 2020-09-29 DIAGNOSIS — Q27.9 VASCULAR MALFORMATION: Primary | ICD-10-CM

## 2020-09-29 NOTE — TELEPHONE ENCOUNTER
"I called and spoke with Maritza, She is doing well post procedure.  She has some discomfort in her arm, but is using compression sometimes but only until it bothers her.  It offers a little bit of relief. She is also icing. She isn't taking any oral medications for pain states \"I am done with that\".   I am awaiting prior authorization for extention for treatment from her insurance company.  GERMAINE Archer RN, BSN  Interventional Radiology Nurse Coordinator   Phone:  [phone]    "

## 2020-09-29 NOTE — LETTER
"October 1, 2020    Maritza Gallardo  [address]  [place] [address]    Dear Maritza,     You have been scheduled for your Sclerotherapy with Dr. Marcial on Thursday October 22nd at 12:30 pm.  Please check in at 10:30 am to the [facility] surgery desk check in located on 3rd floor of the [facility], [address] [place] [address], [facility] [place] [address].    -Nothing to eat or drink 6 hours prior to procedure with the exception of clear liquids, you may have clears up until 2 hours prior to procedure.  -You will need a  as you will be receiving sedation  -You will need to complete a history and physical within 30 days of each procedure.  The completed form may be faxed to [phone].  -I will call you within a few days to see how you are doing post procedure and to get you scheduled for any recommended follow up appointments.    Please don't hesitate to contact me with questions or concerns,    GEMRAINE Archer, RN, BSN  Interventional Radiology Nurse Coordinator   Phone:  [phone]        I will fax order after 10/12 to [facility], you have given me the fax number.    It is required that you get  a COVID test no sooner than 4 days prior to your procedure.  Testing here at an ealth facility (there are multiple locations) is resulted within that time frame.  A rapid testing for patients such as yourself to get tested \"day of\" procedure is in the making, but unfortunately it is not up and running yet.  The Covid team member will contact you about 5 to 7 days prior to procedure to help you schedule a test at a facility that is acceptable to you.  Please call the COVID scheduling team at [phone], if you need further assistance in getting testing scheduled or more information on testing sites.          There is a risk of your treatment/procedure being rescheduled if you are Symptomatic, have a positive covid or no covid result.    In other words it is recommended you get tested somewhere that the " results are back in time.

## 2020-10-01 ENCOUNTER — HOSPITAL ENCOUNTER (OUTPATIENT)
Facility: CLINIC | Age: 25
End: 2020-10-01
Attending: RADIOLOGY
Payer: COMMERCIAL

## 2020-10-01 NOTE — TELEPHONE ENCOUNTER
[facility] fax [phone] for covdania torres. I have Maritza scheduled for another sclerotherapy treatment with Dr Marcial on WB 10/22 Thursday at 12:30 pm.  I will mail a letter with appointment details.  I offered to set Maritza up with [facility] she declined at this time.  GERMAINE Archer RN, BSN  Interventional Radiology Nurse Coordinator   Phone:  [phone]

## 2020-10-02 DIAGNOSIS — Z11.59 ENCOUNTER FOR SCREENING FOR OTHER VIRAL DISEASES: Primary | ICD-10-CM

## 2020-10-20 RX ORDER — LIDOCAINE 40 MG/G
CREAM TOPICAL
Status: CANCELLED | OUTPATIENT
Start: 2020-10-20

## 2020-10-20 RX ORDER — CEFAZOLIN SODIUM 2 G/100ML
2000 INJECTION, SOLUTION INTRAVENOUS ONCE
Status: CANCELLED | OUTPATIENT
Start: 2020-10-20 | End: 2020-10-20

## 2020-10-20 RX ORDER — SODIUM TETRADECYL SULFATE 30 MG/ML
8 INJECTION, SOLUTION INTRAVENOUS
Status: CANCELLED | OUTPATIENT
Start: 2020-10-20

## 2020-10-21 ENCOUNTER — ANESTHESIA EVENT (OUTPATIENT)
Dept: CARDIOLOGY | Facility: CLINIC | Age: 25
End: 2020-10-21
Payer: COMMERCIAL

## 2020-10-21 RX ORDER — CEFAZOLIN SODIUM 2 G/100ML
2 INJECTION, SOLUTION INTRAVENOUS
Status: CANCELLED | OUTPATIENT
Start: 2020-10-21

## 2020-10-21 RX ORDER — SODIUM TETRADECYL SULFATE 30 MG/ML
8 INJECTION, SOLUTION INTRAVENOUS ONCE
Status: CANCELLED | OUTPATIENT
Start: 2020-10-22

## 2020-10-22 ENCOUNTER — HOSPITAL ENCOUNTER (OUTPATIENT)
Dept: INTERVENTIONAL RADIOLOGY/VASCULAR | Facility: CLINIC | Age: 25
Discharge: HOME | End: 2020-10-22
Attending: RADIOLOGY | Admitting: RADIOLOGY
Payer: COMMERCIAL

## 2020-10-22 ENCOUNTER — ANESTHESIA (OUTPATIENT)
Dept: CARDIOLOGY | Facility: CLINIC | Age: 25
End: 2020-10-22
Payer: COMMERCIAL

## 2020-10-22 VITALS
TEMPERATURE: 98.2 F | OXYGEN SATURATION: 96 % | BODY MASS INDEX: 38.24 KG/M2 | RESPIRATION RATE: 12 BRPM | HEIGHT: 65 IN | WEIGHT: 229.5 LBS | HEART RATE: 54 BPM | SYSTOLIC BLOOD PRESSURE: 129 MMHG | DIASTOLIC BLOOD PRESSURE: 82 MMHG

## 2020-10-22 DIAGNOSIS — Q27.9 VASCULAR MALFORMATION: ICD-10-CM

## 2020-10-22 LAB — GLUCOSE SERPL-MCNC: 90 MG/DL (ref 70–99)

## 2020-10-22 PROCEDURE — 250N000013 HC RX MED GY IP 250 OP 250 PS 637: Performed by: ANESTHESIOLOGY

## 2020-10-22 PROCEDURE — 37241 VASC EMBOLIZE/OCCLUDE VENOUS: CPT | Performed by: RADIOLOGY

## 2020-10-22 PROCEDURE — 272N000147 HC KIT CR7

## 2020-10-22 PROCEDURE — 250N000003 HC SEVOFLURANE, EA 15 MIN

## 2020-10-22 PROCEDURE — 370N000001 HC ANESTHESIA TECHNICAL FEE, 1ST 30 MIN

## 2020-10-22 PROCEDURE — 250N000011 HC RX IP 250 OP 636: Performed by: NURSE ANESTHETIST, CERTIFIED REGISTERED

## 2020-10-22 PROCEDURE — 250N000009 HC RX 250: Performed by: PHYSICIAN ASSISTANT

## 2020-10-22 PROCEDURE — 250N000011 HC RX IP 250 OP 636: Performed by: ANESTHESIOLOGY

## 2020-10-22 PROCEDURE — 250N000011 HC RX IP 250 OP 636: Performed by: PHYSICIAN ASSISTANT

## 2020-10-22 PROCEDURE — 82947 ASSAY GLUCOSE BLOOD QUANT: CPT | Performed by: ANESTHESIOLOGY

## 2020-10-22 PROCEDURE — 250N000013 HC RX MED GY IP 250 OP 250 PS 637: Performed by: NURSE ANESTHETIST, CERTIFIED REGISTERED

## 2020-10-22 PROCEDURE — 250N000009 HC RX 250: Performed by: NURSE ANESTHETIST, CERTIFIED REGISTERED

## 2020-10-22 PROCEDURE — 258N000003 HC RX IP 258 OP 636: Performed by: ANESTHESIOLOGY

## 2020-10-22 PROCEDURE — 370N000002 HC ANESTHESIA TECHNICAL FEE, EACH ADDTL 15 MIN

## 2020-10-22 PROCEDURE — 37241 VASC EMBOLIZE/OCCLUDE VENOUS: CPT

## 2020-10-22 RX ORDER — HYDROMORPHONE HYDROCHLORIDE 1 MG/ML
.3-.5 INJECTION, SOLUTION INTRAMUSCULAR; INTRAVENOUS; SUBCUTANEOUS
Status: DISCONTINUED | OUTPATIENT
Start: 2020-10-22 | End: 2020-10-22 | Stop reason: HOSPADM

## 2020-10-22 RX ORDER — DEXAMETHASONE SODIUM PHOSPHATE 4 MG/ML
INJECTION, SOLUTION INTRA-ARTICULAR; INTRALESIONAL; INTRAMUSCULAR; INTRAVENOUS; SOFT TISSUE PRN
Status: DISCONTINUED | OUTPATIENT
Start: 2020-10-22 | End: 2020-10-22

## 2020-10-22 RX ORDER — ONDANSETRON 2 MG/ML
4 INJECTION INTRAMUSCULAR; INTRAVENOUS EVERY 30 MIN PRN
Status: DISCONTINUED | OUTPATIENT
Start: 2020-10-22 | End: 2020-10-22 | Stop reason: HOSPADM

## 2020-10-22 RX ORDER — FENTANYL CITRATE 50 UG/ML
INJECTION, SOLUTION INTRAMUSCULAR; INTRAVENOUS PRN
Status: DISCONTINUED | OUTPATIENT
Start: 2020-10-22 | End: 2020-10-22

## 2020-10-22 RX ORDER — SODIUM CHLORIDE, SODIUM LACTATE, POTASSIUM CHLORIDE, CALCIUM CHLORIDE 600; 310; 30; 20 MG/100ML; MG/100ML; MG/100ML; MG/100ML
INJECTION, SOLUTION INTRAVENOUS CONTINUOUS
Status: DISCONTINUED | OUTPATIENT
Start: 2020-10-22 | End: 2020-10-22 | Stop reason: HOSPADM

## 2020-10-22 RX ORDER — LIDOCAINE 40 MG/G
CREAM TOPICAL
Status: CANCELLED | OUTPATIENT
Start: 2020-10-22

## 2020-10-22 RX ORDER — OXYCODONE HCL 5 MG/5 ML
5 SOLUTION, ORAL ORAL EVERY 4 HOURS PRN
Status: DISCONTINUED | OUTPATIENT
Start: 2020-10-22 | End: 2020-10-22 | Stop reason: HOSPADM

## 2020-10-22 RX ORDER — SODIUM CHLORIDE, SODIUM LACTATE, POTASSIUM CHLORIDE, CALCIUM CHLORIDE 600; 310; 30; 20 MG/100ML; MG/100ML; MG/100ML; MG/100ML
INJECTION, SOLUTION INTRAVENOUS CONTINUOUS
Status: CANCELLED | OUTPATIENT
Start: 2020-10-22

## 2020-10-22 RX ORDER — MEPERIDINE HYDROCHLORIDE 25 MG/ML
12.5 INJECTION INTRAMUSCULAR; INTRAVENOUS; SUBCUTANEOUS
Status: DISCONTINUED | OUTPATIENT
Start: 2020-10-22 | End: 2020-10-22 | Stop reason: HOSPADM

## 2020-10-22 RX ORDER — ONDANSETRON 2 MG/ML
INJECTION INTRAMUSCULAR; INTRAVENOUS PRN
Status: DISCONTINUED | OUTPATIENT
Start: 2020-10-22 | End: 2020-10-22

## 2020-10-22 RX ORDER — KETOROLAC TROMETHAMINE 30 MG/ML
INJECTION, SOLUTION INTRAMUSCULAR; INTRAVENOUS PRN
Status: DISCONTINUED | OUTPATIENT
Start: 2020-10-22 | End: 2020-10-22

## 2020-10-22 RX ORDER — CEFAZOLIN SODIUM 2 G/100ML
2 INJECTION, SOLUTION INTRAVENOUS
Status: COMPLETED | OUTPATIENT
Start: 2020-10-22 | End: 2020-10-22

## 2020-10-22 RX ORDER — DROSPIRENONE AND ETHINYL ESTRADIOL 0.02-3(28)
1 KIT ORAL DAILY
COMMUNITY
Start: 2020-10-13 | End: 2020-12-11

## 2020-10-22 RX ORDER — PROPOFOL 10 MG/ML
INJECTION, EMULSION INTRAVENOUS CONTINUOUS PRN
Status: DISCONTINUED | OUTPATIENT
Start: 2020-10-22 | End: 2020-10-22

## 2020-10-22 RX ORDER — HYDROCODONE BITARTRATE AND ACETAMINOPHEN 5; 325 MG/1; MG/1
1-2 TABLET ORAL EVERY 6 HOURS PRN
Qty: 12 TABLET | Refills: 0 | Status: SHIPPED | OUTPATIENT
Start: 2020-10-22 | End: 2020-10-25

## 2020-10-22 RX ORDER — NALOXONE HYDROCHLORIDE 0.4 MG/ML
.1-.4 INJECTION, SOLUTION INTRAMUSCULAR; INTRAVENOUS; SUBCUTANEOUS
Status: DISCONTINUED | OUTPATIENT
Start: 2020-10-22 | End: 2020-10-22 | Stop reason: HOSPADM

## 2020-10-22 RX ORDER — DROSPIRENONE AND ETHINYL ESTRADIOL TABLETS 0.02-3(28)
1 KIT ORAL DAILY
COMMUNITY
Start: 2020-10-13

## 2020-10-22 RX ORDER — DIPHENHYDRAMINE HYDROCHLORIDE 50 MG/ML
INJECTION INTRAMUSCULAR; INTRAVENOUS PRN
Status: DISCONTINUED | OUTPATIENT
Start: 2020-10-22 | End: 2020-10-22

## 2020-10-22 RX ORDER — OXYCODONE HCL 5 MG/5 ML
5 SOLUTION, ORAL ORAL EVERY 4 HOURS PRN
Status: DISCONTINUED | OUTPATIENT
Start: 2020-10-22 | End: 2020-10-22

## 2020-10-22 RX ORDER — FENTANYL CITRATE 50 UG/ML
25-50 INJECTION, SOLUTION INTRAMUSCULAR; INTRAVENOUS
Status: DISCONTINUED | OUTPATIENT
Start: 2020-10-22 | End: 2020-10-22 | Stop reason: HOSPADM

## 2020-10-22 RX ORDER — LIDOCAINE HYDROCHLORIDE 20 MG/ML
INJECTION, SOLUTION INFILTRATION; PERINEURAL PRN
Status: DISCONTINUED | OUTPATIENT
Start: 2020-10-22 | End: 2020-10-22

## 2020-10-22 RX ORDER — SODIUM TETRADECYL SULFATE 30 MG/ML
8 INJECTION, SOLUTION INTRAVENOUS ONCE
Status: COMPLETED | OUTPATIENT
Start: 2020-10-22 | End: 2020-10-22

## 2020-10-22 RX ORDER — ONDANSETRON 4 MG/1
4 TABLET, ORALLY DISINTEGRATING ORAL EVERY 30 MIN PRN
Status: DISCONTINUED | OUTPATIENT
Start: 2020-10-22 | End: 2020-10-22 | Stop reason: HOSPADM

## 2020-10-22 RX ORDER — PROPOFOL 10 MG/ML
INJECTION, EMULSION INTRAVENOUS PRN
Status: DISCONTINUED | OUTPATIENT
Start: 2020-10-22 | End: 2020-10-22

## 2020-10-22 RX ORDER — LIDOCAINE 40 MG/G
CREAM TOPICAL
Status: DISCONTINUED | OUTPATIENT
Start: 2020-10-22 | End: 2020-10-22 | Stop reason: HOSPADM

## 2020-10-22 RX ORDER — SPIRONOLACTONE 50 MG/1
50 TABLET, FILM COATED ORAL DAILY
COMMUNITY
Start: 2020-10-13

## 2020-10-22 RX ORDER — ACETAMINOPHEN 325 MG/1
975 TABLET ORAL ONCE
Status: COMPLETED | OUTPATIENT
Start: 2020-10-22 | End: 2020-10-22

## 2020-10-22 RX ADMIN — OXYCODONE HYDROCHLORIDE 5 MG: 5 SOLUTION ORAL at 14:54

## 2020-10-22 RX ADMIN — BLEOMYCIN 12 UNITS: 15 INJECTION, POWDER, LYOPHILIZED, FOR SOLUTION INTRAMUSCULAR; INTRAPLEURAL; INTRAVENOUS; SUBCUTANEOUS at 13:54

## 2020-10-22 RX ADMIN — SUGAMMADEX 200 MG: 100 INJECTION, SOLUTION INTRAVENOUS at 13:59

## 2020-10-22 RX ADMIN — CEFAZOLIN 2 G: 10 INJECTION, POWDER, FOR SOLUTION INTRAVENOUS at 13:08

## 2020-10-22 RX ADMIN — HYDROMORPHONE HYDROCHLORIDE 0.5 MG: 1 INJECTION, SOLUTION INTRAMUSCULAR; INTRAVENOUS; SUBCUTANEOUS at 13:27

## 2020-10-22 RX ADMIN — PROPOFOL 200 MG: 10 INJECTION, EMULSION INTRAVENOUS at 12:49

## 2020-10-22 RX ADMIN — LIDOCAINE HYDROCHLORIDE 100 MG: 20 INJECTION, SOLUTION INFILTRATION; PERINEURAL at 13:58

## 2020-10-22 RX ADMIN — FENTANYL CITRATE 100 MCG: 50 INJECTION, SOLUTION INTRAMUSCULAR; INTRAVENOUS at 12:47

## 2020-10-22 RX ADMIN — ROCURONIUM BROMIDE 50 MG: 10 INJECTION INTRAVENOUS at 12:49

## 2020-10-22 RX ADMIN — POLYETHYLENE GLYCOL 400 AND PROPYLENE GLYCOL 1 DROP: 4; 3 SOLUTION/ DROPS OPHTHALMIC at 12:57

## 2020-10-22 RX ADMIN — PROPOFOL 50 MCG/KG/MIN: 10 INJECTION, EMULSION INTRAVENOUS at 13:05

## 2020-10-22 RX ADMIN — LIDOCAINE HYDROCHLORIDE 80 MG: 20 INJECTION, SOLUTION INFILTRATION; PERINEURAL at 12:47

## 2020-10-22 RX ADMIN — DEXAMETHASONE SODIUM PHOSPHATE 8 MG: 4 INJECTION, SOLUTION INTRAMUSCULAR; INTRAVENOUS at 13:14

## 2020-10-22 RX ADMIN — ONDANSETRON 4 MG: 2 INJECTION INTRAMUSCULAR; INTRAVENOUS at 13:56

## 2020-10-22 RX ADMIN — TETRADECYL HYDROGEN SULFATE (ESTER) 5 ML: 30 INJECTION, SOLUTION INTRAVENOUS at 13:57

## 2020-10-22 RX ADMIN — HYDROMORPHONE HYDROCHLORIDE 0.5 MG: 1 INJECTION, SOLUTION INTRAMUSCULAR; INTRAVENOUS; SUBCUTANEOUS at 14:38

## 2020-10-22 RX ADMIN — DIPHENHYDRAMINE HYDROCHLORIDE 25 MG: 50 INJECTION, SOLUTION INTRAMUSCULAR; INTRAVENOUS at 13:03

## 2020-10-22 RX ADMIN — MIDAZOLAM 2 MG: 1 INJECTION INTRAMUSCULAR; INTRAVENOUS at 12:38

## 2020-10-22 RX ADMIN — SODIUM CHLORIDE, POTASSIUM CHLORIDE, SODIUM LACTATE AND CALCIUM CHLORIDE: 600; 310; 30; 20 INJECTION, SOLUTION INTRAVENOUS at 12:40

## 2020-10-22 RX ADMIN — HYDROMORPHONE HYDROCHLORIDE 0.5 MG: 1 INJECTION, SOLUTION INTRAMUSCULAR; INTRAVENOUS; SUBCUTANEOUS at 13:57

## 2020-10-22 RX ADMIN — KETOROLAC TROMETHAMINE 30 MG: 30 INJECTION, SOLUTION INTRAMUSCULAR at 13:56

## 2020-10-22 RX ADMIN — ACETAMINOPHEN 975 MG: 325 TABLET, FILM COATED ORAL at 15:39

## 2020-10-22 ASSESSMENT — LIFESTYLE VARIABLES: TOBACCO_USE: 1

## 2020-10-22 ASSESSMENT — MIFFLIN-ST. JEOR: SCORE: 1791.88

## 2020-10-22 NOTE — ANESTHESIA PREPROCEDURE EVALUATION
"Anesthesia Pre-Procedure Evaluation    Patient: Maritza Gallardo   MRN:     [id number] Gender:   female   Age:    25 year old :      1995        Preoperative Diagnosis: Vascular malformation [Q27.9]   Procedure(s):  Sclerotherapy, Sotradecol Bleomycin to Right Upper Extremety     LABS:  CBC:   Lab Results   Component Value Date    WBC 9.7 2020    WBC 10.8 2020    HGB 13.4 2020    HGB 14.8 2020    HCT 39.4 2020    HCT 43.1 2020     2020     2020     BMP:   Lab Results   Component Value Date    GLC 90 10/22/2020    GLC 84 2020     COAGS:   Lab Results   Component Value Date    PTT 28 2020    INR 1.04 2020    FIBR 401 2017     POC:   Lab Results   Component Value Date     (H) 2020    HCG Negative 10/09/2017    HCGS Negative 2017     OTHER: No results found for: PH, LACT, A1C, HELENE, PHOS, MAG, ALBUMIN, PROTTOTAL, ALT, AST, GGT, ALKPHOS, BILITOTAL, BILIDIRECT, LIPASE, AMYLASE, NEMO, TSH, T4, T3, CRP, SED     Preop Vitals    BP Readings from Last 3 Encounters:   10/22/20 103/73   20 105/56   20 112/67    Pulse Readings from Last 3 Encounters:   20 81   20 64   17 74      Resp Readings from Last 3 Encounters:   10/22/20 12   20 12   20 12    SpO2 Readings from Last 3 Encounters:   10/22/20 98%   20 96%   20 98%      Temp Readings from Last 1 Encounters:   10/22/20 36.7  C (98  F) (Oral)    Ht Readings from Last 1 Encounters:   10/22/20 1.651 m (5' 5\")      Wt Readings from Last 1 Encounters:   10/22/20 104.1 kg (229 lb 8 oz)    Estimated body mass index is 38.19 kg/m  as calculated from the following:    Height as of this encounter: 1.651 m (5' 5\").    Weight as of this encounter: 104.1 kg (229 lb 8 oz).     LDA:  Peripheral IV 10/22/20 Left Hand (Active)   Site Assessment WDL 10/22/20 1204   Line Status Saline locked 10/22/20 120   Dressing Intervention New " dressing ;Other (Comment) 10/22/20 1204   Number of days: 0        Past Medical History:   Diagnosis Date     Amenorrhea      Chronic low back pain      Gastroesophageal reflux disease      Sleep apnea      Venous malformation     right arm      Past Surgical History:   Procedure Laterality Date     IR VEIN SPIDER NON FACE SCLEROTHERAPY  8/25/2020     IR VEIN SPIDER NON FACE SCLEROTHERAPY  9/25/2020     SCLEROTHERAPY      right arm, chest     Sedated MRI      age 6      Allergies   Allergen Reactions     Adhesive Tape Blisters     Seasonal Allergies         Anesthesia Evaluation     . Pt has had prior anesthetic. Type: General and MAC    No history of anesthetic complications          ROS/MED HX    ENT/Pulmonary:     (+)sleep apnea, tobacco use, Current use uses CPAP , . .    Neurologic:  - neg neurologic ROS     Cardiovascular:  - neg cardiovascular ROS       METS/Exercise Tolerance:  4 - Raking leaves, gardening   Hematologic:         Musculoskeletal:   (+)  other musculoskeletal- Low back pain      GI/Hepatic:     (+) GERD Asymptomatic on medication,       Renal/Genitourinary:     (+) Other Renal/ Genitourinary, Amenorrhea       Endo:     (+) Obesity, .      Psychiatric:     (+) psychiatric history depression      Infectious Disease:  - neg infectious disease ROS       Malignancy:      - no malignancy   Other:    - neg other ROS                     PHYSICAL EXAM:   Mental Status/Neuro: A/A/O   Airway: Facies: Feasible  Mallampati: I  Mouth/Opening: Full  TM distance: > 6 cm  Neck ROM: Full   Respiratory: Auscultation: CTAB     Resp. Rate: Normal     Resp. Effort: Normal      CV: Rhythm: Regular  Rate: Age appropriate  Heart: Normal Sounds  Edema: None   Comments:      Dental: Normal Dentition                Assessment:   ASA SCORE: 3    H&P: History and physical reviewed and following examination; no interval change.   Smoking Status:  Active Smoker   NPO Status: NPO Appropriate     Plan:   Anes. Type:  General    Pre-Medication: None   Induction:  IV (Standard)   Airway: ETT; Oral   Access/Monitoring: PIV   Maintenance: Balanced     Postop Plan:   Postop Pain: Opioids  Postop Sedation/Airway: Not planned  Disposition: Outpatient     PONV Management:   Adult Risk Factors: Female, Postop Opioids   Prevention: Ondansetron, Dexamethasone, Propofol, Antihistamines     CONSENT: Direct conversation   Plan and risks discussed with: Patient   Blood Products: Consent Deferred (Minimal Blood Loss)       Comments for Plan/Consent:  - PIV  - GA with ETT  - Maintenance: balanced  - Analgesia: fentanyl, hydromorphone  - PONV prophylaxis: ondansetron, dexamethasone, diphenhydramine, low-dose propofol  - care per IR bleomycin protocol    Risks and benefits of anesthetic approach, including but not limited to sore throat, hoarseness, mucosal injury, dental injury, bronchospasm/laryngospasm, PONV, aspiration, injury to blood vessels and/ or nerves, hemodynamic and respiratory issues including potential long term consequences, bleeding, side effects of blood transfusion and postoperative delirium were discussed with parents and all questions were answered.    Esequiel Greer MD  Pediatric Staff Anesthesiologist  [facility]  Pager [phone]  Phone [phone]                  Esequiel Greer MD

## 2020-10-22 NOTE — ANESTHESIA CARE TRANSFER NOTE
Patient: Maritza Gallardo    Procedure(s):  Sclerotherapy, Sotradecol Bleomycin to Right Upper Extremety    Diagnosis: Vascular malformation [Q27.9]  Diagnosis Additional Information: No value filed.    Anesthesia Type:   General     Note:  Airway :Face Mask  Patient transferred to:PACU  Handoff Report: Identifed the Patient, Identified the Reponsible Provider, Reviewed the pertinent medical history, Discussed the surgical course, Reviewed Intra-OP anesthesia mangement and issues during anesthesia, Set expectations for post-procedure period and Allowed opportunity for questions and acknowledgement of understanding      Vitals: (Last set prior to Anesthesia Care Transfer)    CRNA VITALS  10/22/2020 1335 - 10/22/2020 1414      10/22/2020             NIBP:  130/78    Pulse:  78    NIBP Mean:  97    Temp:  36.8  C (98.2  F)    SpO2:  97 %    Resp Rate (observed):  18                Electronically Signed By: MELODIE Marin CRNA  October 22, 2020  2:14 PM

## 2020-10-22 NOTE — ANESTHESIA PROCEDURE NOTES
Airway   Date/Time: 10/22/2020 12:54 PM   Patient location during procedure: OR    Staff -   Anesthesiologist:  Esequiel Greer MD  CRNA: Susu aHrris APRN CRNA  Performed By: CRNA    Consent for Airway   Urgency: elective    Indications and Patient Condition  Indications for airway management: dc-procedural  Induction type:intravenousMask difficulty assessment: 2 - vent by mask + OA or adjuvant +/- NMBA    Final Airway Details  Final airway type: endotracheal airway  Successful airway:ETT - single and Oral  Endotracheal Airway Details   ETT size (mm): 6.5  Cuffed: yes  Successful intubation technique: direct laryngoscopy  Grade View of Cords: 1  Adjucts: stylet  Measured from: lips  Secured at (cm): 20  Secured with: ties (twill tie per bleomycin protocol)  Bite block used: None    Post intubation assessment   Placement verified by: capnometry, equal breath sounds and chest rise   Number of attempts at approach: 1  Secured with:ties (twill tie per bleomycin protocol)  Ease of procedure: easy  Dentition: Intact and UnchangedAdditional Comments  Placed 6.5 ETT due to patient complaint of sore throat after intubation with prior intubations. ETT lubed prior to placement. Bleomycin precautions in place for securement of ETT.

## 2020-10-22 NOTE — PROCEDURES
[facility]     Procedure: IR Procedure Note    Date/Time: 10/22/2020 1:58 PM  Performed by: Roseline Marcial MD  Authorized by: Roseline Marcial MD     UNIVERSAL PROTOCOL   Site Marked: NA  Prior Images Obtained and Reviewed:  Yes  Required items: Required blood products, implants, devices and special equipment available    Patient identity confirmed:  Verbally with patient, arm band, provided demographic data and hospital-assigned identification number  Patient was reevaluated immediately before administering moderate or deep sedation or anesthesia  Confirmation Checklist:  Patient's identity using two indicators, relevant allergies, procedure was appropriate and matched the consent or emergent situation and correct equipment/implants were available  Time out: Immediately prior to the procedure a time out was called    Universal Protocol: the Joint Commission Universal Protocol was followed    Preparation: Patient was prepped and draped in usual sterile fashion           ANESTHESIA    Anesthesia: Local infiltration  Local Anesthetic:  Lidocaine 1% without epinephrine      SEDATION    Patient Sedated: Yes    Sedation Type:  Deep  Sedation:  See MAR for details  Vital signs: Vital signs monitored during sedation    See dictated procedure note for full details.  Findings: GET, see anesthesia record    Specimens: none    Complications: None    Condition: Stable    Plan: Routine post cares per orders  Additional 1-2 sessions in next 2 months    PROCEDURE   Patient Tolerance:  Patient tolerated the procedure well with no immediate complications  Describe Procedure: Sclerotherapy right arm vascular malformation with 5 mL of 3% sotradecol and 12 units bleomycin  Length of time physician/provider present for 1:1 monitoring during sedation: 0

## 2020-10-22 NOTE — DISCHARGE INSTRUCTIONS
.    Same-Day Surgery   Adult Discharge Orders & Instructions     For 24 hours after surgery:  1. Get plenty of rest.  A responsible adult must stay with you for at least 24 hours after you leave the hospital.   2. Pain medication can slow your reflexes. Do not drive or use heavy equipment.  If you have weakness or tingling, don't drive or use heavy equipment until this feeling goes away.  3. Mixing alcohol and pain medication can cause dizziness and slow your breathing. It can even be fatal. Do not drink alcohol while taking pain medication.  4. Avoid strenuous or risky activities.  Ask for help when climbing stairs.   5. You may feel lightheaded.  If so, sit for a few minutes before standing.  Have someone help you get up.   6. If you have nausea (feel sick to your stomach), drink only clear liquids such as apple juice, ginger ale, broth or 7-Up.  Rest may also help.  Be sure to drink enough fluids.  Move to a regular diet as you feel able. Take pain medications with a small amount of solid food, such as toast or crackers, to avoid nausea.   7. A slight fever is normal. Call the doctor if your fever is over 100 F (37.7 C) (taken under the tongue) or lasts longer than 24 hours.  8. You may have a dry mouth, muscle aches, trouble sleeping or a sore throat.  These symptoms should go away after 24 hours.  9. Do not make important or legal decisions.   Pain Management:      1. Take pain medication (if prescribed) for pain as directed by your physician.        2. WARNING: If the pain medication you have been prescribed contains Tylenol  (acetaminophen), DO NOT take additional doses of Tylenol (acetaminophen).     Call your doctor for any of the followin.  Signs of infection (fever, growing tenderness at the surgery site, severe pain, a large amount of drainage or bleeding, foul-smelling drainage, redness, swelling).    2.  It has been over 8 to 10 hours since surgery and you are still not able to urinate  (pee).    3.  Headache for over 24 hours.    4.  Numbness, tingling or weakness the day after surgery (if you had spinal anesthesia).  To contact a doctor, call _____________________________________ or:      [phone] and ask for the Resident On Call for:          __________________________________________ (answered 24 hours a day)      Emergency Department:  [place] [facility]: [phone]  [place] [facility]: [phone]               Rev. 10/2014

## 2020-10-27 ENCOUNTER — TELEPHONE (OUTPATIENT)
Dept: RADIOLOGY | Facility: CLINIC | Age: 25
End: 2020-10-27

## 2020-10-27 NOTE — TELEPHONE ENCOUNTER
I called and spoke with Maritza.  She is sore from this procedure more than the last time.  She had a lot of pain and had issues with her recovery. I have given her the patient relations phone number.  She didn't have a great experience.  She was told not to ice or compression post She was taking ibuprofen doesn't help her, she is out of prescribed dilaudid.  She has to go through PCP for all prescriptions due t o her insurance plan.  I have submitted for 2 additional treatments with Dr Marcial from PETERSON Archer, RN, BSN  Interventional Radiology Nurse Coordinator   Phone:  [phone]

## 2020-10-30 NOTE — ANESTHESIA POSTPROCEDURE EVALUATION
Anesthesia POST Procedure Evaluation    Patient: Maritza Gallardo   MRN:     [id number] Gender:   female   Age:    25 year old :      1995        Preoperative Diagnosis: Vascular malformation [Q27.9]   Procedure(s):  Sclerotherapy, Sotradecol Bleomycin to Right Upper Extremety   Postop Comments: No value filed.     Anesthesia Type: General       Disposition: Outpatient   Postop Pain Control: Uneventful            Sign Out: Well controlled pain   PONV: No   Neuro/Psych: Uneventful            Sign Out: Acceptable/Baseline neuro status   Airway/Respiratory: Uneventful            Sign Out: Acceptable/Baseline resp. status   CV/Hemodynamics: Uneventful            Sign Out: Acceptable CV status   Other NRE: NONE   DID A NON-ROUTINE EVENT OCCUR? No    Event details/Postop Comments:  I personally evaluated the patient at bedside. No anesthesia-related complications noted. Patient is hemodynamically stable with adequate control of pain and nausea. Ready for discharge from PACU. All questions were answered.    Esequiel Greer MD  Pediatric Staff Anesthesiologist  [facility]  Pager [phone]  Phone [phone]          Last Anesthesia Record Vitals:  CRNA VITALS  10/22/2020 1335 - 10/22/2020 1435      10/22/2020             NIBP:  130/78    Pulse:  78    NIBP Mean:  97    Temp:  36.8  C (98.2  F)    SpO2:  97 %    Resp Rate (observed):  18          Last PACU Vitals:  Vitals Value Taken Time   /73 10/22/20 1500   Temp 36.8  C (98.2  F) 10/22/20 1411   Pulse 54 10/22/20 1500   Resp 16 10/22/20 1500   SpO2 96 % 10/22/20 1500   Temp src     NIBP 130/78 10/22/20 1410   Pulse 78 10/22/20 1410   SpO2 97 % 10/22/20 1410   Resp     Temp 36.8  C (98.2  F) 10/22/20 1410   Ht Rate     Temp 2           Electronically Signed By: Esequiel Greer MD, 2020, 8:28 AM

## 2020-11-24 ENCOUNTER — TELEPHONE (OUTPATIENT)
Dept: RADIOLOGY | Facility: CLINIC | Age: 25
End: 2020-11-24

## 2020-11-24 DIAGNOSIS — Q27.9 VASCULAR MALFORMATION: Primary | ICD-10-CM

## 2020-11-24 NOTE — LETTER
"November 30, 2020    Maritza Gallardo  [address]  [place] [address]    Dear Maritza,     You have been scheduled for your Sclerotherapy with Dr. Marcial on Tuesday December 15th at 12:30 pm.  Please check in at 10:30 am to the [facility] surgery desk check in located on 3rd floor of the [facility], [address] [place] [address], [facility] [place] [address].    -Nothing to eat or drink 8 hours prior to procedure with the exception of clear liquids, you may have clears up until 2 hours prior to procedure.  -You will need a  as you will be receiving sedation  -You will need to complete a history and physical within 30 days of each procedure.  The completed form may be faxed to [phone].  -See Covid testing requirements, if you need me to fax order, please call me with Fax number for your clinic.  -I will call you within a few days to see how you are doing post procedure and to get you scheduled for any recommended follow up appointments    Please don't hesitate to contact me with questions or concerns,    ABeth Archer, RN, BSN  Interventional Radiology Nurse Coordinator   Phone:  [phone]          It is required that you get  a COVID test no sooner than 4 days prior to your procedure.  [facility] (there are multiple locations) is resulted within that time frame.  A rapid testing for patients such as yourself to get tested \"day of\" procedure is in the making, but unfortunately it is not up and running yet.  The Covid team member will contact you about 5 to 7 days prior to procedure to help you schedule a test at a facility that is acceptable to you.  Please call the COVID scheduling team at [phone], if you need further assistance in getting testing scheduled or more information on testing sites.          There is a risk of your treatment/procedure being rescheduled if you are Symptomatic, have a positive covid or no covid result.    In other words it is recommended you get tested " somewhere that the results are back in time.

## 2020-11-30 DIAGNOSIS — Z11.59 ENCOUNTER FOR SCREENING FOR OTHER VIRAL DISEASES: Primary | ICD-10-CM

## 2020-11-30 NOTE — TELEPHONE ENCOUNTER
I called and spoke with Maritza.  She was recently in the ED near home, dx small kidney stone.  She was sent home push fluids and have it pass.  We discussed her next sclerotherapy treatment with Dr Marcial.  She would not be cancelled unless S&S of infection or Covid positive test.  I have mailed a letter with 12/15/20 appt details.  We will schedule her next treatment after the new year, current Auth is good until 5/2021.  GERMAINE Archer RN, BSN  Interventional Radiology Nurse Coordinator   Phone:  [phone]

## 2020-12-03 ENCOUNTER — TRANSFERRED RECORDS (OUTPATIENT)
Dept: HEALTH INFORMATION MANAGEMENT | Facility: CLINIC | Age: 25
End: 2020-12-03

## 2020-12-03 LAB
ALT SERPL-CCNC: 11 U/L (ref 7–52)
AST SERPL-CCNC: 9 U/L (ref 13–39)
CREAT SERPL-MCNC: 0.8 MG/DL (ref 0.7–1.3)
GFR SERPL CREATININE-BSD FRML MDRD: >60 ML/MIN/1.73M(2)
GLUCOSE SERPL-MCNC: 138 MG/DL (ref 70–105)
POTASSIUM SERPL-SCNC: 3.6 MMOL/L (ref 3.5–5.1)

## 2020-12-04 ENCOUNTER — TRANSFERRED RECORDS (OUTPATIENT)
Dept: HEALTH INFORMATION MANAGEMENT | Facility: CLINIC | Age: 25
End: 2020-12-04

## 2020-12-11 RX ORDER — LORATADINE 10 MG/1
10 TABLET ORAL DAILY
COMMUNITY

## 2020-12-14 ENCOUNTER — ANESTHESIA EVENT (OUTPATIENT)
Dept: CARDIOLOGY | Facility: CLINIC | Age: 25
End: 2020-12-14
Payer: COMMERCIAL

## 2020-12-14 RX ORDER — SODIUM TETRADECYL SULFATE 30 MG/ML
8 INJECTION, SOLUTION INTRAVENOUS ONCE
Status: CANCELLED | OUTPATIENT
Start: 2020-12-15

## 2020-12-14 ASSESSMENT — LIFESTYLE VARIABLES: TOBACCO_USE: 1

## 2020-12-15 ENCOUNTER — HOSPITAL ENCOUNTER (OUTPATIENT)
Dept: INTERVENTIONAL RADIOLOGY/VASCULAR | Facility: CLINIC | Age: 25
Discharge: HOME | End: 2020-12-15
Attending: RADIOLOGY | Admitting: RADIOLOGY
Payer: COMMERCIAL

## 2020-12-15 ENCOUNTER — ANESTHESIA (OUTPATIENT)
Dept: CARDIOLOGY | Facility: CLINIC | Age: 25
End: 2020-12-15
Payer: COMMERCIAL

## 2020-12-15 ENCOUNTER — HOSPITAL ENCOUNTER (OUTPATIENT)
Facility: CLINIC | Age: 25
Discharge: HOME | End: 2020-12-15
Attending: RADIOLOGY | Admitting: RADIOLOGY
Payer: COMMERCIAL

## 2020-12-15 VITALS
SYSTOLIC BLOOD PRESSURE: 118 MMHG | HEIGHT: 65 IN | DIASTOLIC BLOOD PRESSURE: 71 MMHG | TEMPERATURE: 98.6 F | WEIGHT: 228.18 LBS | BODY MASS INDEX: 38.02 KG/M2 | RESPIRATION RATE: 20 BRPM | OXYGEN SATURATION: 98 % | HEART RATE: 70 BPM

## 2020-12-15 DIAGNOSIS — Q27.9 VASCULAR MALFORMATION: Primary | ICD-10-CM

## 2020-12-15 DIAGNOSIS — Q27.9 VASCULAR MALFORMATION: ICD-10-CM

## 2020-12-15 LAB
GLUCOSE SERPL-MCNC: 107 MG/DL (ref 70–99)
HCG UR QL: NEGATIVE
INR PPP: 0.96 (ref 0.86–1.14)
LABORATORY COMMENT REPORT: NORMAL
PLATELET # BLD AUTO: 292 10E9/L (ref 150–450)
POTASSIUM SERPL-SCNC: 3.8 MMOL/L (ref 3.4–5.3)
SARS-COV-2 RNA SPEC QL NAA+PROBE: NEGATIVE
SARS-COV-2 RNA SPEC QL NAA+PROBE: NORMAL
SPECIMEN SOURCE: NORMAL
SPECIMEN SOURCE: NORMAL

## 2020-12-15 PROCEDURE — 250N000011 HC RX IP 250 OP 636: Performed by: STUDENT IN AN ORGANIZED HEALTH CARE EDUCATION/TRAINING PROGRAM

## 2020-12-15 PROCEDURE — 85049 AUTOMATED PLATELET COUNT: CPT | Performed by: ANESTHESIOLOGY

## 2020-12-15 PROCEDURE — 250N000013 HC RX MED GY IP 250 OP 250 PS 637: Performed by: NURSE ANESTHETIST, CERTIFIED REGISTERED

## 2020-12-15 PROCEDURE — 36415 COLL VENOUS BLD VENIPUNCTURE: CPT | Performed by: ANESTHESIOLOGY

## 2020-12-15 PROCEDURE — 82947 ASSAY GLUCOSE BLOOD QUANT: CPT | Performed by: ANESTHESIOLOGY

## 2020-12-15 PROCEDURE — 250N000009 HC RX 250: Performed by: RADIOLOGY

## 2020-12-15 PROCEDURE — 250N000013 HC RX MED GY IP 250 OP 250 PS 637: Performed by: STUDENT IN AN ORGANIZED HEALTH CARE EDUCATION/TRAINING PROGRAM

## 2020-12-15 PROCEDURE — 250N000011 HC RX IP 250 OP 636: Performed by: RADIOLOGY

## 2020-12-15 PROCEDURE — 250N000011 HC RX IP 250 OP 636: Performed by: NURSE ANESTHETIST, CERTIFIED REGISTERED

## 2020-12-15 PROCEDURE — 250N000011 HC RX IP 250 OP 636: Performed by: ANESTHESIOLOGY

## 2020-12-15 PROCEDURE — 37241 VASC EMBOLIZE/OCCLUDE VENOUS: CPT | Performed by: RADIOLOGY

## 2020-12-15 PROCEDURE — 258N000003 HC RX IP 258 OP 636: Performed by: ANESTHESIOLOGY

## 2020-12-15 PROCEDURE — 250N000003 HC SEVOFLURANE, EA 15 MIN: Performed by: RADIOLOGY

## 2020-12-15 PROCEDURE — 370N000001 HC ANESTHESIA TECHNICAL FEE, 1ST 30 MIN: Performed by: RADIOLOGY

## 2020-12-15 PROCEDURE — 272N000569 HC SHEATH CR6

## 2020-12-15 PROCEDURE — 250N000011 HC RX IP 250 OP 636: Performed by: PHYSICIAN ASSISTANT

## 2020-12-15 PROCEDURE — 85610 PROTHROMBIN TIME: CPT | Performed by: ANESTHESIOLOGY

## 2020-12-15 PROCEDURE — 250N000009 HC RX 250: Performed by: NURSE ANESTHETIST, CERTIFIED REGISTERED

## 2020-12-15 PROCEDURE — P9047 ALBUMIN (HUMAN), 25%, 50ML: HCPCS | Performed by: RADIOLOGY

## 2020-12-15 PROCEDURE — 37241 VASC EMBOLIZE/OCCLUDE VENOUS: CPT

## 2020-12-15 PROCEDURE — 84132 ASSAY OF SERUM POTASSIUM: CPT | Performed by: ANESTHESIOLOGY

## 2020-12-15 PROCEDURE — 81025 URINE PREGNANCY TEST: CPT | Performed by: ANESTHESIOLOGY

## 2020-12-15 PROCEDURE — C1769 GUIDE WIRE: HCPCS

## 2020-12-15 PROCEDURE — U0003 INFECTIOUS AGENT DETECTION BY NUCLEIC ACID (DNA OR RNA); SEVERE ACUTE RESPIRATORY SYNDROME CORONAVIRUS 2 (SARS-COV-2) (CORONAVIRUS DISEASE [COVID-19]), AMPLIFIED PROBE TECHNIQUE, MAKING USE OF HIGH THROUGHPUT TECHNOLOGIES AS DESCRIBED BY CMS-2020-01-R: HCPCS | Performed by: RADIOLOGY

## 2020-12-15 PROCEDURE — 370N000002 HC ANESTHESIA TECHNICAL FEE, EACH ADDTL 15 MIN: Performed by: RADIOLOGY

## 2020-12-15 RX ORDER — ONDANSETRON 2 MG/ML
INJECTION INTRAMUSCULAR; INTRAVENOUS PRN
Status: DISCONTINUED | OUTPATIENT
Start: 2020-12-15 | End: 2020-12-15

## 2020-12-15 RX ORDER — DEXAMETHASONE SODIUM PHOSPHATE 4 MG/ML
INJECTION, SOLUTION INTRA-ARTICULAR; INTRALESIONAL; INTRAMUSCULAR; INTRAVENOUS; SOFT TISSUE PRN
Status: DISCONTINUED | OUTPATIENT
Start: 2020-12-15 | End: 2020-12-15

## 2020-12-15 RX ORDER — IOPAMIDOL 612 MG/ML
15 INJECTION, SOLUTION INTRATHECAL ONCE
Status: COMPLETED | OUTPATIENT
Start: 2020-12-15 | End: 2020-12-15

## 2020-12-15 RX ORDER — ACETAMINOPHEN 325 MG/1
975 TABLET ORAL ONCE
Status: COMPLETED | OUTPATIENT
Start: 2020-12-15 | End: 2020-12-15

## 2020-12-15 RX ORDER — ONDANSETRON 2 MG/ML
4 INJECTION INTRAMUSCULAR; INTRAVENOUS EVERY 30 MIN PRN
Status: DISCONTINUED | OUTPATIENT
Start: 2020-12-15 | End: 2020-12-15 | Stop reason: HOSPADM

## 2020-12-15 RX ORDER — CEFAZOLIN SODIUM 2 G/100ML
2 INJECTION, SOLUTION INTRAVENOUS
Status: COMPLETED | OUTPATIENT
Start: 2020-12-15 | End: 2020-12-15

## 2020-12-15 RX ORDER — LIDOCAINE HYDROCHLORIDE 10 MG/ML
1-5 INJECTION, SOLUTION EPIDURAL; INFILTRATION; INTRACAUDAL; PERINEURAL ONCE
Status: COMPLETED | OUTPATIENT
Start: 2020-12-15 | End: 2020-12-15

## 2020-12-15 RX ORDER — FENTANYL CITRATE 50 UG/ML
INJECTION, SOLUTION INTRAMUSCULAR; INTRAVENOUS PRN
Status: DISCONTINUED | OUTPATIENT
Start: 2020-12-15 | End: 2020-12-15

## 2020-12-15 RX ORDER — FENTANYL CITRATE 50 UG/ML
25-50 INJECTION, SOLUTION INTRAMUSCULAR; INTRAVENOUS
Status: DISCONTINUED | OUTPATIENT
Start: 2020-12-15 | End: 2020-12-15 | Stop reason: HOSPADM

## 2020-12-15 RX ORDER — LIDOCAINE HYDROCHLORIDE 40 MG/ML
SOLUTION TOPICAL PRN
Status: DISCONTINUED | OUTPATIENT
Start: 2020-12-15 | End: 2020-12-15

## 2020-12-15 RX ORDER — ALBUMIN (HUMAN) 12.5 G/50ML
12.5 SOLUTION INTRAVENOUS ONCE
Status: COMPLETED | OUTPATIENT
Start: 2020-12-15 | End: 2020-12-15

## 2020-12-15 RX ORDER — ONDANSETRON 4 MG/1
4 TABLET, ORALLY DISINTEGRATING ORAL EVERY 30 MIN PRN
Status: DISCONTINUED | OUTPATIENT
Start: 2020-12-15 | End: 2020-12-15 | Stop reason: HOSPADM

## 2020-12-15 RX ORDER — NALOXONE HYDROCHLORIDE 0.4 MG/ML
0.2 INJECTION, SOLUTION INTRAMUSCULAR; INTRAVENOUS; SUBCUTANEOUS
Status: DISCONTINUED | OUTPATIENT
Start: 2020-12-15 | End: 2020-12-15 | Stop reason: HOSPADM

## 2020-12-15 RX ORDER — PROPOFOL 10 MG/ML
INJECTION, EMULSION INTRAVENOUS PRN
Status: DISCONTINUED | OUTPATIENT
Start: 2020-12-15 | End: 2020-12-15

## 2020-12-15 RX ORDER — PROPOFOL 10 MG/ML
INJECTION, EMULSION INTRAVENOUS CONTINUOUS PRN
Status: DISCONTINUED | OUTPATIENT
Start: 2020-12-15 | End: 2020-12-15

## 2020-12-15 RX ORDER — SODIUM CHLORIDE, SODIUM LACTATE, POTASSIUM CHLORIDE, CALCIUM CHLORIDE 600; 310; 30; 20 MG/100ML; MG/100ML; MG/100ML; MG/100ML
INJECTION, SOLUTION INTRAVENOUS CONTINUOUS
Status: DISCONTINUED | OUTPATIENT
Start: 2020-12-15 | End: 2020-12-15 | Stop reason: HOSPADM

## 2020-12-15 RX ORDER — SODIUM TETRADECYL SULFATE 30 MG/ML
8 INJECTION, SOLUTION INTRAVENOUS ONCE
Status: CANCELLED | OUTPATIENT
Start: 2020-12-15

## 2020-12-15 RX ORDER — NALOXONE HYDROCHLORIDE 0.4 MG/ML
0.4 INJECTION, SOLUTION INTRAMUSCULAR; INTRAVENOUS; SUBCUTANEOUS
Status: DISCONTINUED | OUTPATIENT
Start: 2020-12-15 | End: 2020-12-15 | Stop reason: HOSPADM

## 2020-12-15 RX ORDER — ALBUTEROL SULFATE 0.83 MG/ML
3 SOLUTION RESPIRATORY (INHALATION) EVERY 4 HOURS PRN
Status: DISCONTINUED | OUTPATIENT
Start: 2020-12-15 | End: 2020-12-15 | Stop reason: HOSPADM

## 2020-12-15 RX ORDER — OXYCODONE HCL 5 MG/5 ML
5 SOLUTION, ORAL ORAL EVERY 4 HOURS PRN
Status: DISCONTINUED | OUTPATIENT
Start: 2020-12-15 | End: 2020-12-15 | Stop reason: HOSPADM

## 2020-12-15 RX ORDER — OXYCODONE HYDROCHLORIDE 5 MG/1
5 TABLET ORAL EVERY 4 HOURS PRN
Status: DISCONTINUED | OUTPATIENT
Start: 2020-12-15 | End: 2020-12-15 | Stop reason: HOSPADM

## 2020-12-15 RX ORDER — SODIUM TETRADECYL SULFATE 30 MG/ML
8 INJECTION, SOLUTION INTRAVENOUS ONCE
Status: COMPLETED | OUTPATIENT
Start: 2020-12-15 | End: 2020-12-15

## 2020-12-15 RX ORDER — LIDOCAINE HYDROCHLORIDE 20 MG/ML
INJECTION, SOLUTION INFILTRATION; PERINEURAL PRN
Status: DISCONTINUED | OUTPATIENT
Start: 2020-12-15 | End: 2020-12-15

## 2020-12-15 RX ORDER — DIPHENHYDRAMINE HYDROCHLORIDE 50 MG/ML
INJECTION INTRAMUSCULAR; INTRAVENOUS PRN
Status: DISCONTINUED | OUTPATIENT
Start: 2020-12-15 | End: 2020-12-15

## 2020-12-15 RX ORDER — HYDROCODONE BITARTRATE AND ACETAMINOPHEN 5; 325 MG/1; MG/1
1 TABLET ORAL EVERY 6 HOURS PRN
Qty: 12 TABLET | Refills: 0 | Status: SHIPPED | OUTPATIENT
Start: 2020-12-15

## 2020-12-15 RX ORDER — HYDROMORPHONE HYDROCHLORIDE 1 MG/ML
.2-.4 INJECTION, SOLUTION INTRAMUSCULAR; INTRAVENOUS; SUBCUTANEOUS EVERY 10 MIN PRN
Status: DISCONTINUED | OUTPATIENT
Start: 2020-12-15 | End: 2020-12-15 | Stop reason: HOSPADM

## 2020-12-15 RX ORDER — KETOROLAC TROMETHAMINE 30 MG/ML
INJECTION, SOLUTION INTRAMUSCULAR; INTRAVENOUS PRN
Status: DISCONTINUED | OUTPATIENT
Start: 2020-12-15 | End: 2020-12-15

## 2020-12-15 RX ORDER — SODIUM CHLORIDE 9 MG/ML
INJECTION, SOLUTION INTRAVENOUS CONTINUOUS
Status: DISCONTINUED | OUTPATIENT
Start: 2020-12-15 | End: 2020-12-15 | Stop reason: HOSPADM

## 2020-12-15 RX ADMIN — OXYCODONE HYDROCHLORIDE 5 MG: 5 TABLET ORAL at 17:37

## 2020-12-15 RX ADMIN — ROCURONIUM BROMIDE 50 MG: 10 INJECTION INTRAVENOUS at 15:08

## 2020-12-15 RX ADMIN — ACETAMINOPHEN 975 MG: 325 TABLET ORAL at 17:24

## 2020-12-15 RX ADMIN — HYDROMORPHONE HYDROCHLORIDE 0.1 MG: 1 INJECTION, SOLUTION INTRAMUSCULAR; INTRAVENOUS; SUBCUTANEOUS at 17:37

## 2020-12-15 RX ADMIN — HYDROMORPHONE HYDROCHLORIDE 0.5 MG: 1 INJECTION, SOLUTION INTRAMUSCULAR; INTRAVENOUS; SUBCUTANEOUS at 16:03

## 2020-12-15 RX ADMIN — HYDROMORPHONE HYDROCHLORIDE 0.4 MG: 1 INJECTION, SOLUTION INTRAMUSCULAR; INTRAVENOUS; SUBCUTANEOUS at 17:16

## 2020-12-15 RX ADMIN — DIPHENHYDRAMINE HYDROCHLORIDE 25 MG: 50 INJECTION, SOLUTION INTRAMUSCULAR; INTRAVENOUS at 15:29

## 2020-12-15 RX ADMIN — IOPAMIDOL 6 ML: 612 INJECTION, SOLUTION INTRATHECAL at 16:37

## 2020-12-15 RX ADMIN — LIDOCAINE HYDROCHLORIDE 1 ML: 10 INJECTION, SOLUTION EPIDURAL; INFILTRATION; INTRACAUDAL; PERINEURAL at 16:38

## 2020-12-15 RX ADMIN — ALBUMIN HUMAN 2 ML: 0.25 SOLUTION INTRAVENOUS at 16:34

## 2020-12-15 RX ADMIN — LIDOCAINE HYDROCHLORIDE 100 MG: 20 INJECTION, SOLUTION INFILTRATION; PERINEURAL at 15:09

## 2020-12-15 RX ADMIN — POLYETHYLENE GLYCOL 400 AND PROPYLENE GLYCOL 1 DROP: 4; 3 SOLUTION/ DROPS OPHTHALMIC at 15:09

## 2020-12-15 RX ADMIN — HYDROMORPHONE HYDROCHLORIDE 0.5 MG: 1 INJECTION, SOLUTION INTRAMUSCULAR; INTRAVENOUS; SUBCUTANEOUS at 16:31

## 2020-12-15 RX ADMIN — FENTANYL CITRATE 50 MCG: 50 INJECTION, SOLUTION INTRAMUSCULAR; INTRAVENOUS at 15:34

## 2020-12-15 RX ADMIN — BLEOMYCIN 6 UNITS: 15 INJECTION, POWDER, LYOPHILIZED, FOR SOLUTION INTRAMUSCULAR; INTRAPLEURAL; INTRAVENOUS; SUBCUTANEOUS at 16:42

## 2020-12-15 RX ADMIN — PROPOFOL 50 MCG/KG/MIN: 10 INJECTION, EMULSION INTRAVENOUS at 15:22

## 2020-12-15 RX ADMIN — TETRADECYL HYDROGEN SULFATE (ESTER) 2.5 ML: 30 INJECTION, SOLUTION INTRAVENOUS at 16:36

## 2020-12-15 RX ADMIN — KETOROLAC TROMETHAMINE 30 MG: 30 INJECTION, SOLUTION INTRAMUSCULAR at 16:31

## 2020-12-15 RX ADMIN — CEFAZOLIN 2 G: 10 INJECTION, POWDER, FOR SOLUTION INTRAVENOUS at 15:27

## 2020-12-15 RX ADMIN — LIDOCAINE HYDROCHLORIDE 5 ML: 40 SOLUTION TOPICAL at 15:12

## 2020-12-15 RX ADMIN — DEXAMETHASONE SODIUM PHOSPHATE 4 MG: 4 INJECTION, SOLUTION INTRAMUSCULAR; INTRAVENOUS at 15:29

## 2020-12-15 RX ADMIN — SUGAMMADEX 200 MG: 100 INJECTION, SOLUTION INTRAVENOUS at 16:42

## 2020-12-15 RX ADMIN — FOSAPREPITANT 150 MG: 150 INJECTION, POWDER, LYOPHILIZED, FOR SOLUTION INTRAVENOUS at 15:43

## 2020-12-15 RX ADMIN — FENTANYL CITRATE 50 MCG: 50 INJECTION, SOLUTION INTRAMUSCULAR; INTRAVENOUS at 17:04

## 2020-12-15 RX ADMIN — ONDANSETRON 4 MG: 2 INJECTION INTRAMUSCULAR; INTRAVENOUS at 16:31

## 2020-12-15 RX ADMIN — PROPOFOL 200 MG: 10 INJECTION, EMULSION INTRAVENOUS at 15:08

## 2020-12-15 RX ADMIN — SODIUM CHLORIDE, POTASSIUM CHLORIDE, SODIUM LACTATE AND CALCIUM CHLORIDE: 600; 310; 30; 20 INJECTION, SOLUTION INTRAVENOUS at 14:55

## 2020-12-15 RX ADMIN — MIDAZOLAM 2 MG: 1 INJECTION INTRAMUSCULAR; INTRAVENOUS at 14:55

## 2020-12-15 RX ADMIN — FENTANYL CITRATE 50 MCG: 50 INJECTION, SOLUTION INTRAMUSCULAR; INTRAVENOUS at 15:09

## 2020-12-15 ASSESSMENT — MIFFLIN-ST. JEOR: SCORE: 1785.88

## 2020-12-15 NOTE — ANESTHESIA CARE TRANSFER NOTE
Patient: Maritza Gallardo    Procedure(s):  Right Upper Extremity with sclerotherapy with Bleomycin    Diagnosis: * No pre-op diagnosis entered *  Diagnosis Additional Information: No value filed.    Anesthesia Type:   General     Note:  Airway :Face Mask  Patient transferred to:PACU  Handoff Report: Identifed the Patient, Identified the Reponsible Provider, Reviewed the pertinent medical history, Discussed the surgical course, Reviewed Intra-OP anesthesia mangement and issues during anesthesia, Set expectations for post-procedure period and Allowed opportunity for questions and acknowledgement of understanding      Vitals: (Last set prior to Anesthesia Care Transfer)    CRNA VITALS  12/15/2020 1618 - 12/15/2020 1700      12/15/2020             NIBP:  125/70    Pulse:  105    Temp:  37  C (98.6  F)    SpO2:  100 %    Resp Rate (observed):  15                Electronically Signed By: MELODIE Marin CRNA  December 15, 2020  5:00 PM

## 2020-12-15 NOTE — ANESTHESIA PREPROCEDURE EVALUATION
"Anesthesia Pre-Procedure Evaluation    Patient: Maritza Gallardo   MRN:     [id number] Gender:   female   Age:    25 year old :      1995        Preoperative Diagnosis: * No pre-op diagnosis entered *   Procedure(s):  Right Upper Extremity with sclerotherapy with Bleomycin     LABS:  CBC:   Lab Results   Component Value Date    WBC 9.7 2020    WBC 10.8 2020    HGB 13.4 2020    HGB 14.8 2020    HCT 39.4 2020    HCT 43.1 2020     2020     2020     BMP:   Lab Results   Component Value Date    POTASSIUM 3.6 2020    CR 0.80 2020     (A) 2020    GLC 90 10/22/2020     COAGS:   Lab Results   Component Value Date    PTT 28 2020    INR 1.04 2020    FIBR 401 2017     POC:   Lab Results   Component Value Date     (H) 2020    HCG Negative 10/09/2017    HCGS Negative 2017     OTHER:   Lab Results   Component Value Date    ALT 11 2020    AST 9 (A) 2020        Preop Vitals    BP Readings from Last 3 Encounters:   10/22/20 129/82   20 105/56   20 112/67    Pulse Readings from Last 3 Encounters:   10/22/20 54   20 81   20 64      Resp Readings from Last 3 Encounters:   10/22/20 12   20 12   20 12    SpO2 Readings from Last 3 Encounters:   10/22/20 96%   20 96%   20 98%      Temp Readings from Last 1 Encounters:   10/22/20 36.8  C (98.2  F) (Oral)    Ht Readings from Last 1 Encounters:   10/22/20 1.651 m (5' 5\")      Wt Readings from Last 1 Encounters:   10/22/20 104.1 kg (229 lb 8 oz)    Estimated body mass index is 38.19 kg/m  as calculated from the following:    Height as of 10/22/20: 1.651 m (5' 5\").    Weight as of 10/22/20: 104.1 kg (229 lb 8 oz).     LDA:  Peripheral IV 10/22/20 Left Hand (Active)   Number of days: 53        Past Medical History:   Diagnosis Date     Amenorrhea      Chronic low back pain      Gastroesophageal reflux disease  "     PONV (postoperative nausea and vomiting)      Sleep apnea      Venous malformation     right arm      Past Surgical History:   Procedure Laterality Date     IR VEIN SPIDER NON FACE SCLEROTHERAPY  8/25/2020     IR VEIN SPIDER NON FACE SCLEROTHERAPY  9/25/2020     IR VEIN SPIDER NON FACE SCLEROTHERAPY  10/22/2020     SCLEROTHERAPY      right arm, chest     Sedated MRI      age 6      Allergies   Allergen Reactions     Adhesive Tape Blisters     Morphine Hives     Seasonal Allergies         Anesthesia Evaluation     . Pt has had prior anesthetic. Type: General and MAC    No history of anesthetic complications          ROS/MED HX    ENT/Pulmonary:     (+)sleep apnea, tobacco use, Current use uses CPAP , . .    Neurologic:  - neg neurologic ROS     Cardiovascular:  - neg cardiovascular ROS       METS/Exercise Tolerance:  4 - Raking leaves, gardening   Hematologic:         Musculoskeletal:   (+)  other musculoskeletal- Low back pain      GI/Hepatic:     (+) GERD Asymptomatic on medication,       Renal/Genitourinary:     (+) Other Renal/ Genitourinary, Amenorrhea       Endo:     (+) Obesity, .      Psychiatric:     (+) psychiatric history depression      Infectious Disease:  - neg infectious disease ROS       Malignancy:      - no malignancy   Other:    - neg other ROS                     PHYSICAL EXAM:   Mental Status/Neuro: A/A/O   Airway: Facies: Feasible  Mallampati: I  Mouth/Opening: Full  TM distance: > 6 cm  Neck ROM: Full   Respiratory: Auscultation: CTAB     Resp. Rate: Normal     Resp. Effort: Normal      CV: Rhythm: Regular  Rate: Age appropriate  Heart: Normal Sounds  Edema: None   Comments:      Dental: Normal Dentition                Assessment:   ASA SCORE: 3            Plan:   Anes. Type:  General   Pre-Medication: None   Induction:  IV (Standard)   Airway: ETT; Oral   Access/Monitoring: PIV   Maintenance: Balanced     Postop Plan:   Postop Pain: Opioids  Postop Sedation/Airway: Not planned     PONV  Management:   Adult Risk Factors: Female, H/o PONV or Motion Sickness, Postop Opioids       Comments for Plan/Consent:  Care per BLEOMYCIN protocol!!!                 Esequiel Greer MD

## 2020-12-15 NOTE — ANESTHESIA PROCEDURE NOTES
Airway   Date/Time: 12/15/2020 3:12 PM   Patient location during procedure: OR    Staff -   Anesthesiologist:  Lavern White MD  CRNA: Susu Harris APRN CRNA  Performed By: CRNA    Consent for Airway   Urgency: elective    Indications and Patient Condition  Indications for airway management: dc-procedural  Induction type:intravenousMask difficulty assessment: 1 - vent by mask    Final Airway Details  Final airway type: endotracheal airway  Successful airway:ETT - single and Oral  Endotracheal Airway Details   ETT size (mm): 6.5  Cuffed: yes  Successful intubation technique: direct laryngoscopy  Adjucts: stylet  Bite block used: None    Post intubation assessment   Placement verified by: capnometry, equal breath sounds and chest rise   Number of attempts at approach: 1  Ease of procedure: easy  Dentition: Intact and Unchanged

## 2020-12-15 NOTE — DISCHARGE INSTRUCTIONS
Same-Day Surgery   Adult Discharge Orders & Instructions     For 24 hours after surgery:  1. Get plenty of rest.  A responsible adult must stay with you for at least 24 hours after you leave the hospital.   2. Pain medication can slow your reflexes. Do not drive or use heavy equipment.  If you have weakness or tingling, don't drive or use heavy equipment until this feeling goes away.  3. Mixing alcohol and pain medication can cause dizziness and slow your breathing. It can even be fatal. Do not drink alcohol while taking pain medication.  4. Avoid strenuous or risky activities.  Ask for help when climbing stairs.   5. You may feel lightheaded.  If so, sit for a few minutes before standing.  Have someone help you get up.   6. If you have nausea (feel sick to your stomach), drink only clear liquids such as apple juice, ginger ale, broth or 7-Up.  Rest may also help.  Be sure to drink enough fluids.  Move to a regular diet as you feel able. Take pain medications with a small amount of solid food, such as toast or crackers, to avoid nausea.   7. A slight fever is normal. Call the doctor if your fever is over 100 F (37.7 C) (taken under the tongue) or lasts longer than 24 hours.  8. You may have a dry mouth, muscle aches, trouble sleeping or a sore throat.  These symptoms should go away after 24 hours.  9. Do not make important or legal decisions.   Pain Management:      1. Take pain medication (if prescribed) for pain as directed by your physician.        2. WARNING: If the pain medication you have been prescribed contains Tylenol  (acetaminophen), DO NOT take additional doses of Tylenol (acetaminophen).     Call your doctor for any of the followin.  Signs of infection (fever, growing tenderness at the surgery site, severe pain, a large amount of drainage or bleeding, foul-smelling drainage, redness, swelling).    2.  It has been over 8 to 10 hours since surgery and you are still not able to urinate (pee).    3.   Headache for over 24 hours.    4.  Numbness, tingling or weakness the day after surgery (if you had spinal anesthesia).  To contact a doctor, call Dr. Marcial @  or:      [phone] and ask for the Resident On Call for:          Interventional Radiology  (answered 24 hours a day)      Emergency Department:  [facility]: [phone]  [facility]: [phone]               Rev. 10/2014

## 2020-12-16 ENCOUNTER — TELEPHONE (OUTPATIENT)
Dept: RADIOLOGY | Facility: CLINIC | Age: 25
End: 2020-12-16

## 2020-12-16 NOTE — TELEPHONE ENCOUNTER
I called and spoke with Maritza.  She is doing well post sclerotherapy with Dr Marcial.  She is using ice and compression for pain relief.  She is having issues with pain meds with the physician.  I asked if she tried ibuprofen, she said no that doesn't work.  Plan is for clinical f/up visit in 2 months with Dr Marcial to assess treatment response and need for additional treatment.  GERMAINE Archer, RN, BSN  Interventional Radiology Nurse Coordinator   Phone:  [phone]

## 2020-12-16 NOTE — ANESTHESIA POSTPROCEDURE EVALUATION
"Anesthesia POST Procedure Evaluation    Patient: Maritza Gallardo   MRN:     [id number] Gender:   female   Age:    25 year old :      1995        Preoperative Diagnosis: * No pre-op diagnosis entered *   Procedure(s):  Right Upper Extremity with sclerotherapy with Bleomycin   Postop Comments: No value filed.     Anesthesia Type: General       Disposition: Outpatient   Postop Pain Control: Uneventful            Sign Out: Well controlled pain   PONV: No   Neuro/Psych: Uneventful            Sign Out: Acceptable/Baseline neuro status   Airway/Respiratory: Uneventful            Sign Out: Acceptable/Baseline resp. status   CV/Hemodynamics: Uneventful            Sign Out: Acceptable CV status   Other NRE: NONE   DID A NON-ROUTINE EVENT OCCUR? No    Event details/Postop Comments:  Reports that her throat feels good \"so far.\" Pain improved with treatment. Denies nausea, tolerating PO. Denies questions regarding anesthesia.         Last Anesthesia Record Vitals:  CRNA VITALS  12/15/2020 1618 - 12/15/2020 1718      12/15/2020             NIBP:  125/70    Pulse:  105    Temp:  37  C (98.6  F)    SpO2:  100 %    Resp Rate (observed):  15          Last PACU Vitals:  Vitals Value Taken Time   /71 12/15/20 1745   Temp 37  C (98.6  F) 12/15/20 1745   Pulse 70 12/15/20 1730   Resp 18 12/15/20 1745   SpO2 98 % 12/15/20 1745   Temp src     NIBP 125/70 12/15/20 1655   Pulse 105 12/15/20 1655   SpO2 100 % 12/15/20 1655   Resp     Temp 37  C (98.6  F) 12/15/20 1655   Ht Rate     Temp 2     Vitals shown include unvalidated device data.      Electronically Signed By: Holly Wilson MD, December 15, 2020, 9:21 PM  "

## 2021-02-02 NOTE — ANESTHESIA PREPROCEDURE EVALUATION
"Anesthesia Pre-Procedure Evaluation    Patient: Maritza Gallardo   MRN:     [id number] Gender:   female   Age:    25 year old :      1995        Preoperative Diagnosis: Venous malformation [Q27.9]   Procedure(s):  To I.R. for Right Upper Extremity Sclerothery @1300     LABS:  CBC:   Lab Results   Component Value Date    WBC 10.8 2020    WBC 9.1 10/09/2017    HGB 14.8 2020    HGB 13.9 10/09/2017    HCT 43.1 2020    HCT 40.0 10/09/2017     2020     10/09/2017     BMP: No results found for: NA, POTASSIUM, CHLORIDE, CO2, BUN, CR, GLC  COAGS:   Lab Results   Component Value Date    PTT 26 10/09/2017    INR 0.97 10/09/2017    FIBR 401 2017     POC:   Lab Results   Component Value Date    BGM 87 10/09/2017    HCG Negative 10/09/2017    HCGS Negative 2017     OTHER: No results found for: PH, LACT, A1C, HELENE, PHOS, MAG, ALBUMIN, PROTTOTAL, ALT, AST, GGT, ALKPHOS, BILITOTAL, BILIDIRECT, LIPASE, AMYLASE, NEMO, TSH, T4, T3, CRP, SED     Preop Vitals    BP Readings from Last 3 Encounters:   20 115/71   10/09/17 129/63   17 116/74    Pulse Readings from Last 3 Encounters:   20 80   17 74   17 76      Resp Readings from Last 3 Encounters:   20 18   10/09/17 24   17 16    SpO2 Readings from Last 3 Encounters:   20 98%   10/09/17 98%   17 98%      Temp Readings from Last 1 Encounters:   20 37.3  C (99.1  F) (Oral)    Ht Readings from Last 1 Encounters:   20 1.651 m (5' 5\")      Wt Readings from Last 1 Encounters:   20 105.5 kg (232 lb 9.4 oz)    Estimated body mass index is 38.7 kg/m  as calculated from the following:    Height as of this encounter: 1.651 m (5' 5\").    Weight as of this encounter: 105.5 kg (232 lb 9.4 oz).     LDA:  Peripheral IV 20 Left Lower forearm (Active)   Number of days: 113       Peripheral IV 20 Left;Dorsal Hand (Active)   Site Assessment WDL 20 1130   Line Status " Pt called asking if he should have the COVID vaccine. His wife's [place] physician Dr. Polo Ray in [place] states since pt has had COVID he has antibodies and should hold off on the vaccine. Pt would like an order. Pt would like a call back with advice. Saline locked 08/25/20 1130   Phlebitis Scale 0-->no symptoms 08/25/20 1130   Infiltration Scale 0 08/25/20 1130   Extravasation? No 08/25/20 1130   Dressing Intervention New dressing  08/25/20 1130   Number of days: 0       ETT (adult) 7 (Active)   Number of days: 1051        Past Medical History:   Diagnosis Date     Amenorrhea      Chronic low back pain      Gastroesophageal reflux disease      Sleep apnea      Venous malformation     right arm      Past Surgical History:   Procedure Laterality Date     SCLEROTHERAPY      right arm, chest     Sedated MRI      age 6      Allergies   Allergen Reactions     Adhesive Tape Blisters     Seasonal Allergies         Anesthesia Evaluation     . Pt has had prior anesthetic. Type: General and MAC    No history of anesthetic complications          ROS/MED HX    ENT/Pulmonary:     (+)sleep apnea, tobacco use, Current use uses CPAP , . .    Neurologic:  - neg neurologic ROS     Cardiovascular:  - neg cardiovascular ROS       METS/Exercise Tolerance:  4 - Raking leaves, gardening   Hematologic:         Musculoskeletal:   (+)  other musculoskeletal- Low back pain      GI/Hepatic:     (+) GERD Asymptomatic on medication,       Renal/Genitourinary:     (+) Other Renal/ Genitourinary, Amenorrhea       Endo:     (+) Obesity, .      Psychiatric:     (+) psychiatric history depression      Infectious Disease:  - neg infectious disease ROS       Malignancy:      - no malignancy   Other:    - neg other ROS                     PHYSICAL EXAM:   Mental Status/Neuro: A/A/O   Airway: Facies: Thick Neck; Macroglossia  Mallampati: II  Mouth/Opening: Full  TM distance: > 6 cm  Neck ROM: Full   Respiratory: Auscultation: CTAB     Resp. Rate: Normal     Resp. Effort: Normal      CV: Rhythm: Regular  Rate: Age appropriate  Heart: Normal Sounds  Edema: None   Comments:      Dental: Normal Dentition                Assessment:   ASA SCORE: 2    H&P: History and physical reviewed and following  examination; no interval change.     Smoking Status:  Active Smoker       - patient did not smoke on day of surgery   NPO Status: NPO Appropriate     Plan:   Anes. Type:  General   Pre-Medication: None   Induction:  IV (Standard)   Airway: ETT; Oral; CMAC/VL   Access/Monitoring: PIV   Maintenance: Balanced     Postop Plan:   Postop Pain: Opioids  Postop Sedation/Airway: Not planned  Disposition: Outpatient     PONV Management:   Adult Risk Factors: Female, Postop Opioids   Prevention: Ondansetron, Dexamethasone     CONSENT: Direct conversation   Plan and risks discussed with: Patient   Blood Products: Consent Deferred (Minimal Blood Loss)       Comments for Plan/Consent:  GETA, Standard ASA monitoring  All available and pertinent medical records and test results reviewed.  Risks, including but not limited to airway injury, bronchospasm,  hypoxemia, PONV d/w patient                 Shelley Johnson MD

## 2021-02-24 ENCOUNTER — VIRTUAL VISIT (OUTPATIENT)
Dept: RADIOLOGY | Facility: CLINIC | Age: 26
End: 2021-02-24
Attending: RADIOLOGY
Payer: COMMERCIAL

## 2021-02-24 DIAGNOSIS — Q27.9 VASCULAR MALFORMATION: Primary | ICD-10-CM

## 2021-02-24 PROCEDURE — 99213 OFFICE O/P EST LOW 20 MIN: CPT | Mod: 95 | Performed by: RADIOLOGY

## 2021-02-24 PROCEDURE — 999N001193 HC VIDEO/TELEPHONE VISIT; NO CHARGE

## 2021-02-24 NOTE — NURSING NOTE
Photo's from 2/24 as emailed by Maritza Archer, RN, BSN  Interventional Radiology Nurse Coordinator   Phone:  [phone]

## 2021-02-24 NOTE — LETTER
2/24/2021         RE: Maritza Gallardo  [address]  [facility] [address]        Dear Colleague,    Thank you for referring your patient, Maritza Gallardo, to the [facility]. Please see a copy of my visit note below.    25 year old who is being evaluated via a billable video visit.      How would you like to obtain your AVS? Mail a copy  If the video visit is dropped, the invitation should be resent by: Send to e-mail at: [email or web address]  Will anyone else be joining your video visit? No    Interventional Radiology Clinic Visit    Maritza is a 25 year old who is being evaluated via a billable video visit.       How would you like to obtain your AVS? Mail a copy  If the video visit is dropped, the invitation should be resent by: Send to e-mail at: [email or web address]  Will anyone else be joining your video visit? No       Video Start Time: 0944  Video-Visit Details     Type of service:  Video Visit     Video End Time: 1004    Originating Location (pt. 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 She states there has been essentially no change in her symptomatology. No skin ulceration or breakdown. She has significant arm soreness with even moderate use of the extremity. Compression used previously did not help.    Review of Systems:    Negative except as noted    Past Medical/Surgical History:    Past Medical History:   Diagnosis Date     Amenorrhea      Chronic low back pain      Gastroesophageal reflux disease       PONV (postoperative nausea and vomiting)      Sleep apnea      Venous malformation     right arm     Past Surgical History:   Procedure Laterality Date     IR VEIN SPIDER NON FACE SCLEROTHERAPY  8/25/2020     IR VEIN SPIDER NON FACE SCLEROTHERAPY  9/25/2020     IR VEIN SPIDER NON FACE SCLEROTHERAPY  10/22/2020     IR VEIN SPIDER NON FACE SCLEROTHERAPY  12/15/2020     SCLEROTHERAPY      right arm, chest     Sedated MRI      age 6     SURGICAL RADIOLOGY PROCEDURE Right 12/15/2020    Procedure: Right Upper Extremity with sclerotherapy with Bleomycin;  Surgeon: Roseline Marcial MD;  Location:  [facility]       Current Medications:    Current Outpatient Medications   Medication Sig Dispense Refill     Biotin 2500 MCG CAPS Take 2,500 mcg by mouth daily        busPIRone (BUSPAR) 15 MG tablet Take 1 tablet by mouth 3 times daily       calcium-vitamin D (CALTRATE) 600-400 MG-UNIT per tablet Take 1 tablet by mouth daily        FEXOFENADINE HCL PO Take 180 mg by mouth daily       gabapentin (NEURONTIN) 300 MG capsule Take 2 capsules by mouth daily       HYDROcodone-acetaminophen (NORCO) 5-325 MG tablet Take 1 tablet by mouth every 6 hours as needed for severe pain 12 tablet 0     ketoconazole (NIZORAL) 2 % shampoo Apply topically daily as needed for itching or irritation       loratadine (CLARITIN) 10 MG tablet Take 10 mg by mouth daily       LORYNA 3-0.02 MG tablet Take 1 tablet by mouth daily       meclizine (ANTIVERT) 25 MG tablet Take 25 mg by mouth as needed        oxyCODONE (ROXICODONE) 5 MG tablet Take 2 tablets (10 mg) by mouth every 4 hours as needed for moderate to severe pain 20 tablet 0     pantoprazole (PROTONIX) 40 MG EC tablet Take 1 tablet by mouth daily       spironolactone (ALDACTONE) 50 MG tablet Take 50 mg by mouth daily       sucralfate (CARAFATE) 1 GM tablet 3 times daily        topiramate (TOPAMAX) 50 MG tablet Take 100 mg by mouth 2 times daily        triamcinolone (KENALOG)  0.025 % cream Apply topically 2 times daily as needed        DULoxetine (CYMBALTA) 20 MG capsule Take 20 mg by mouth daily        order for DME Please measure and distribute 1 custom compression sleeve garment of 15mmHg - 20mmHg fingerless, measuring from fingers to up upper arm.  Pt with extensive vascular malformation that involves whole arm. (Patient not taking: Reported on 2/24/2021) 1 each 0       Allergies:    Adhesive tape, Morphine, and Seasonal allergies    Family History:    No family history on file.    Social History:        Social History     Socioeconomic History     Marital status: Single     Spouse name: None     Number of children: None     Years of education: None     Highest education level: None   Occupational History     None   Social Needs     Financial resource strain: None     Food insecurity     Worry: None     Inability: None     Transportation needs     Medical: None     Non-medical: None   Tobacco Use     Smoking status: Heavy Tobacco Smoker     Packs/day: 0.50     Types: Cigarettes     Smokeless tobacco: Never Used   Substance and Sexual Activity     Alcohol use: Yes     Comment: rarely     Drug use: Yes     Types: Marijuana     Comment: medical -vape     Sexual activity: None   Lifestyle     Physical activity     Days per week: None     Minutes per session: None     Stress: None   Relationships     Social connections     Talks on phone: None     Gets together: None     Attends Rastafari service: None     Active member of club or organization: None     Attends meetings of clubs or organizations: None     Relationship status: None     Intimate partner violence     Fear of current or ex partner: None     Emotionally abused: None     Physically abused: None     Forced sexual activity: None   Other Topics Concern     Parent/sibling w/ CABG, MI or angioplasty before 65F 55M? Not Asked   Social History Narrative     None       Physical Exam:    There were no vitals taken for this visit.      GENERAL APPEARANCE: healthy, alert and no distress  PSYCHIATRIC: mentation appears normal and affect normal.  EYES: No jaundice.  SKIN: No jaundice.       Laboratory Studies:    Lab Results   Component Value Date    HGB 13.4 09/25/2020     Lab Results   Component Value Date     12/15/2020     Lab Results   Component Value Date    WBC 9.7 09/25/2020       Lab Results   Component Value Date    INR 0.96 12/15/2020       No results found for: PROTTOTAL   No results found for: ALBUMIN  No results found for: BILITOTAL  No results found for: BILICONJ   No results found for: ALKPHOS  Lab Results   Component Value Date    AST 9 12/03/2020     Lab Results   Component Value Date    ALT 11 12/03/2020       Lab Results   Component Value Date    CR 0.80 12/03/2020     No results found for: BUN    No results found for: FETO    Imaging:     n/a    ASSESSMENT/PLAN:      Maritza Gallardo is a 25 year old female with right arm VM s/p sclerotherapy x 4 with sotradecol and bleomycin over a course of the last several months. She has unfortunately not received any clinical benefit from the treatments, despite ultrasound at the times of treatment demonstrating occlusion of previously treated areas.    1. She will be seen in the multidisciplinary malformation clinic to meet with other specialists including surgery, hematology and dermatology to determine other possible treatment options. It is recommended this be in person visit.   2. I ordered coagulation labs to assess for LIC.     All of her questions were answered.    I was present for the entire visit and agree with the assessment and plan documented by Dr. Smith.    It was a pleasure to conduct this video visit with Ms. Gallardo today.  Thank you for involving the interventional radiology service in her care.    I spent a total of 20 minutes with Maritza Gallardo during today's video visit.  Over 50% of this time was spent counseling the patient and/or coordinating care  regarding vascular malformation.  See note for details.      Roseline Marcial MD  Interventional Radiology   Pager [phone]    Vascular malformation    - CBC with platelets; Future  - INR; Future  - Fibrinogen activity; Future  - Prothrombin fragment; Future  - D dimer quantitative; Future       CC  Patient Care Team:  Julieta Zapien NP as PCP - General  Lucita Alcazar RN as Registered Nurse  Roseline Marcial MD as MD (Radiology)  SELF, REFERRED

## 2021-02-24 NOTE — PROGRESS NOTES
Interventional Radiology Clinic Visit    Maritza is a 25 year old who is being evaluated via a billable video visit.       How would you like to obtain your AVS? Mail a copy  If the video visit is dropped, the invitation should be resent by: Send to e-mail at: [email or web address]  Will anyone else be joining your video visit? No       Video Start Time: 0944  Video-Visit Details     Type of service:  Video Visit     Video End Time: 1004    Originating Location (pt. 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 She states there has been essentially no change in her symptomatology. No skin ulceration or breakdown. She has significant arm soreness with even moderate use of the extremity. Compression used previously did not help.    Review of Systems:    Negative except as noted    Past Medical/Surgical History:    Past Medical History:   Diagnosis Date     Amenorrhea      Chronic low back pain      Gastroesophageal reflux disease      PONV (postoperative nausea and vomiting)      Sleep apnea      Venous malformation     right arm     Past Surgical History:   Procedure Laterality Date     IR VEIN SPIDER NON FACE SCLEROTHERAPY  8/25/2020     IR VEIN SPIDER NON FACE SCLEROTHERAPY  9/25/2020     IR VEIN SPIDER NON FACE SCLEROTHERAPY  10/22/2020     IR VEIN SPIDER NON FACE SCLEROTHERAPY  12/15/2020     SCLEROTHERAPY      right arm, chest     Sedated MRI      age 6     SURGICAL RADIOLOGY PROCEDURE Right 12/15/2020    Procedure: Right Upper Extremity with sclerotherapy with  Bleomycin;  Surgeon: Roseline Marcial MD;  Location:  [facility]       Current Medications:    Current Outpatient Medications   Medication Sig Dispense Refill     Biotin 2500 MCG CAPS Take 2,500 mcg by mouth daily        busPIRone (BUSPAR) 15 MG tablet Take 1 tablet by mouth 3 times daily       calcium-vitamin D (CALTRATE) 600-400 MG-UNIT per tablet Take 1 tablet by mouth daily        FEXOFENADINE HCL PO Take 180 mg by mouth daily       gabapentin (NEURONTIN) 300 MG capsule Take 2 capsules by mouth daily       HYDROcodone-acetaminophen (NORCO) 5-325 MG tablet Take 1 tablet by mouth every 6 hours as needed for severe pain 12 tablet 0     ketoconazole (NIZORAL) 2 % shampoo Apply topically daily as needed for itching or irritation       loratadine (CLARITIN) 10 MG tablet Take 10 mg by mouth daily       LORYNA 3-0.02 MG tablet Take 1 tablet by mouth daily       meclizine (ANTIVERT) 25 MG tablet Take 25 mg by mouth as needed        oxyCODONE (ROXICODONE) 5 MG tablet Take 2 tablets (10 mg) by mouth every 4 hours as needed for moderate to severe pain 20 tablet 0     pantoprazole (PROTONIX) 40 MG EC tablet Take 1 tablet by mouth daily       spironolactone (ALDACTONE) 50 MG tablet Take 50 mg by mouth daily       sucralfate (CARAFATE) 1 GM tablet 3 times daily        topiramate (TOPAMAX) 50 MG tablet Take 100 mg by mouth 2 times daily        triamcinolone (KENALOG) 0.025 % cream Apply topically 2 times daily as needed        DULoxetine (CYMBALTA) 20 MG capsule Take 20 mg by mouth daily        order for DME Please measure and distribute 1 custom compression sleeve garment of 15mmHg - 20mmHg fingerless, measuring from fingers to up upper arm.  Pt with extensive vascular malformation that involves whole arm. (Patient not taking: Reported on 2/24/2021) 1 each 0       Allergies:    Adhesive tape, Morphine, and Seasonal allergies    Family History:    No family history on file.    Social  History:        Social History     Socioeconomic History     Marital status: Single     Spouse name: None     Number of children: None     Years of education: None     Highest education level: None   Occupational History     None   Social Needs     Financial resource strain: None     Food insecurity     Worry: None     Inability: None     Transportation needs     Medical: None     Non-medical: None   Tobacco Use     Smoking status: Heavy Tobacco Smoker     Packs/day: 0.50     Types: Cigarettes     Smokeless tobacco: Never Used   Substance and Sexual Activity     Alcohol use: Yes     Comment: rarely     Drug use: Yes     Types: Marijuana     Comment: medical -vape     Sexual activity: None   Lifestyle     Physical activity     Days per week: None     Minutes per session: None     Stress: None   Relationships     Social connections     Talks on phone: None     Gets together: None     Attends [employer] service: None     Active member of club or organization: None     Attends meetings of clubs or organizations: None     Relationship status: None     Intimate partner violence     Fear of current or ex partner: None     Emotionally abused: None     Physically abused: None     Forced sexual activity: None   Other Topics Concern     Parent/sibling w/ CABG, MI or angioplasty before 65F 55M? Not Asked   Social History Narrative     None       Physical Exam:    There were no vitals taken for this visit.     GENERAL APPEARANCE: healthy, alert and no distress  PSYCHIATRIC: mentation appears normal and affect normal.  EYES: No jaundice.  SKIN: No jaundice.       Laboratory Studies:    Lab Results   Component Value Date    HGB 13.4 09/25/2020     Lab Results   Component Value Date     12/15/2020     Lab Results   Component Value Date    WBC 9.7 09/25/2020       Lab Results   Component Value Date    INR 0.96 12/15/2020       No results found for: PROTTOTAL   No results found for: ALBUMIN  No results found for: BILITOTAL  No  results found for: BILICONJ   No results found for: ALKPHOS  Lab Results   Component Value Date    AST 9 12/03/2020     Lab Results   Component Value Date    ALT 11 12/03/2020       Lab Results   Component Value Date    CR 0.80 12/03/2020     No results found for: BUN    No results found for: FETO    Imaging:     n/a    ASSESSMENT/PLAN:      Maritza Gallardo is a 25 year old female with right arm VM s/p sclerotherapy x 4 with sotradecol and bleomycin over a course of the last several months. She has unfortunately not received any clinical benefit from the treatments, despite ultrasound at the times of treatment demonstrating occlusion of previously treated areas.    1. She will be seen in the multidisciplinary malformation clinic to meet with other specialists including surgery, hematology and dermatology to determine other possible treatment options. It is recommended this be in person visit.   2. I ordered coagulation labs to assess for LIC.     All of her questions were answered.    I was present for the entire visit and agree with the assessment and plan documented by Dr. Smith.    It was a pleasure to conduct this video visit with Ms. Gallardo today.  Thank you for involving the interventional radiology service in her care.    I spent a total of 20 minutes with Maritza Gallardo during today's video visit.  Over 50% of this time was spent counseling the patient and/or coordinating care regarding vascular malformation.  See note for details.      Roseline Marcial MD  Interventional Radiology   Pager [phone]    Vascular malformation    - CBC with platelets; Future  - INR; Future  - Fibrinogen activity; Future  - Prothrombin fragment; Future  - D dimer quantitative; Future       CC  Patient Care Team:  Julieta Zapien NP as PCP - General  Lucita Alcazar RN as Registered Nurse  Roseline Marcial MD as MD (Radiology)  SELF, REFERRED

## 2021-02-24 NOTE — PROGRESS NOTES
25 year old who is being evaluated via a billable video visit.      How would you like to obtain your AVS? Mail a copy  If the video visit is dropped, the invitation should be resent by: Send to e-mail at: [email or web address]  Will anyone else be joining your video visit? No

## 2021-04-07 ENCOUNTER — HOSPITAL ENCOUNTER (OUTPATIENT)
Facility: CLINIC | Age: 26
End: 2021-04-07
Attending: DERMATOLOGY | Admitting: DERMATOLOGY
Payer: COMMERCIAL

## 2021-04-07 ENCOUNTER — OFFICE VISIT (OUTPATIENT)
Dept: DERMATOLOGY | Facility: CLINIC | Age: 26
End: 2021-04-07
Attending: NURSE PRACTITIONER
Payer: COMMERCIAL

## 2021-04-07 ENCOUNTER — OFFICE VISIT (OUTPATIENT)
Dept: DERMATOLOGY | Facility: CLINIC | Age: 26
End: 2021-04-07
Attending: RADIOLOGY
Payer: COMMERCIAL

## 2021-04-07 ENCOUNTER — OFFICE VISIT (OUTPATIENT)
Dept: DERMATOLOGY | Facility: CLINIC | Age: 26
End: 2021-04-07
Attending: DERMATOLOGY
Payer: COMMERCIAL

## 2021-04-07 ENCOUNTER — OFFICE VISIT (OUTPATIENT)
Dept: DERMATOLOGY | Facility: CLINIC | Age: 26
End: 2021-04-07
Attending: PLASTIC SURGERY
Payer: COMMERCIAL

## 2021-04-07 VITALS — WEIGHT: 229.94 LBS | BODY MASS INDEX: 38.31 KG/M2 | HEIGHT: 65 IN

## 2021-04-07 VITALS — HEIGHT: 65 IN | WEIGHT: 229.94 LBS | BODY MASS INDEX: 38.31 KG/M2

## 2021-04-07 VITALS
SYSTOLIC BLOOD PRESSURE: 127 MMHG | HEIGHT: 65 IN | BODY MASS INDEX: 38.31 KG/M2 | WEIGHT: 229.94 LBS | HEART RATE: 112 BPM | DIASTOLIC BLOOD PRESSURE: 82 MMHG

## 2021-04-07 VITALS
WEIGHT: 229.94 LBS | HEIGHT: 65 IN | BODY MASS INDEX: 38.31 KG/M2 | DIASTOLIC BLOOD PRESSURE: 82 MMHG | HEART RATE: 112 BPM | SYSTOLIC BLOOD PRESSURE: 127 MMHG

## 2021-04-07 DIAGNOSIS — M79.601 PAIN OF RIGHT UPPER EXTREMITY: ICD-10-CM

## 2021-04-07 DIAGNOSIS — E66.01 MORBID OBESITY (H): ICD-10-CM

## 2021-04-07 DIAGNOSIS — Q27.9 VASCULAR MALFORMATION: Primary | ICD-10-CM

## 2021-04-07 DIAGNOSIS — Q27.9 VASCULAR MALFORMATION: ICD-10-CM

## 2021-04-07 DIAGNOSIS — Q27.8 GLOMUVENOUS MALFORMATION: Primary | ICD-10-CM

## 2021-04-07 LAB
D DIMER PPP FEU-MCNC: 0.4 UG/ML FEU (ref 0–0.5)
ERYTHROCYTE [DISTWIDTH] IN BLOOD BY AUTOMATED COUNT: 12.1 % (ref 10–15)
FIBRINOGEN PPP-MCNC: 459 MG/DL (ref 200–420)
HCT VFR BLD AUTO: 43.4 % (ref 35–47)
HGB BLD-MCNC: 14.7 G/DL (ref 11.7–15.7)
INR PPP: 0.91 (ref 0.86–1.14)
MCH RBC QN AUTO: 30 PG (ref 26.5–33)
MCHC RBC AUTO-ENTMCNC: 33.9 G/DL (ref 31.5–36.5)
MCV RBC AUTO: 89 FL (ref 78–100)
PLATELET # BLD AUTO: 270 10E9/L (ref 150–450)
RBC # BLD AUTO: 4.9 10E12/L (ref 3.8–5.2)
WBC # BLD AUTO: 13.6 10E9/L (ref 4–11)

## 2021-04-07 PROCEDURE — 85379 FIBRIN DEGRADATION QUANT: CPT | Performed by: RADIOLOGY

## 2021-04-07 PROCEDURE — 99205 OFFICE O/P NEW HI 60 MIN: CPT | Performed by: NURSE PRACTITIONER

## 2021-04-07 PROCEDURE — 85397 CLOTTING FUNCT ACTIVITY: CPT | Performed by: RADIOLOGY

## 2021-04-07 PROCEDURE — G0463 HOSPITAL OUTPT CLINIC VISIT: HCPCS

## 2021-04-07 PROCEDURE — 85027 COMPLETE CBC AUTOMATED: CPT | Performed by: RADIOLOGY

## 2021-04-07 PROCEDURE — 99203 OFFICE O/P NEW LOW 30 MIN: CPT | Performed by: DERMATOLOGY

## 2021-04-07 PROCEDURE — 85384 FIBRINOGEN ACTIVITY: CPT | Performed by: RADIOLOGY

## 2021-04-07 PROCEDURE — 999N000103 HC STATISTIC NO CHARGE FACILITY FEE

## 2021-04-07 PROCEDURE — 99213 OFFICE O/P EST LOW 20 MIN: CPT | Mod: 95 | Performed by: RADIOLOGY

## 2021-04-07 PROCEDURE — 36415 COLL VENOUS BLD VENIPUNCTURE: CPT | Performed by: RADIOLOGY

## 2021-04-07 PROCEDURE — 85610 PROTHROMBIN TIME: CPT | Performed by: RADIOLOGY

## 2021-04-07 PROCEDURE — 99203 OFFICE O/P NEW LOW 30 MIN: CPT | Performed by: PLASTIC SURGERY

## 2021-04-07 PROCEDURE — 99417 PROLNG OP E/M EACH 15 MIN: CPT | Performed by: NURSE PRACTITIONER

## 2021-04-07 ASSESSMENT — MIFFLIN-ST. JEOR
SCORE: 1789.49

## 2021-04-07 ASSESSMENT — PAIN SCALES - GENERAL
PAINLEVEL: NO PAIN (0)

## 2021-04-07 NOTE — PATIENT INSTRUCTIONS
[facility]- Pediatric Dermatology  Dr. Elaine Coon, Dr. Ghislaine Bautista, Dr. Linda Starkey, Erika Molina, PETERSON Rowley, Dr. Judy Goldstein & Dr. Junior Harvey       Non Urgent  Nurse Triage Line; [phone]- Anisha and Valentina MERA Care Coordinators      Marilee (/Complex ) [phone]      If you need a prescription refill, please contact your pharmacy. Refills are approved or denied by our Physicians during normal business hours, Monday through Fridays    Per office policy, refills will not be granted if you have not been seen within the past year (or sooner depending on your child's condition)      Scheduling Information:     Pediatric Appointment Scheduling and Call Center [phone]   Radiology Scheduling- [phone]     Sedation Unit Scheduling- [phone]    [place] Scheduling- [facility] [phone]; Pediatric Dermatology [phone]    Main  Services: [phone]   Greenlandic: [phone]   Burkinan: [phone]   Hmong/Croatian/English: [phone]      Preadmission Nursing Department Fax Number: [phone] (Fax all pre-operative paperwork to this number)      For urgent matters arising during evenings, weekends, or holidays that cannot wait for normal business hours please call [phone] and ask for the Dermatology Resident On-Call to be paged.

## 2021-04-07 NOTE — PROGRESS NOTES
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 There is no clear indication for surgical intervention at this time. Last MRI demonstrated that a significant component of this appears supra-fascial. Maritza is being seen today in the multidisciplinary Vascular Lesions Clinic. She is in clinic today on her own initially, and later, a friend joined.     HPI:   Maritza states that she has noticed the discoloration and intermittent pain for as long as she can remember. Her parents identified it when she was an infant and it's persisted. Maritza's biggest concern is the ongoing discomfort. She describes it as an ache with intermittent sharp pain, no necessarily a burning sensation. She has not had numbness or tingling. Maritza notices the pain to increase with use. She is right hand dominant, which leads to increased pain at times as well. Maritza has full function of her arm with ROM and dexterity. She has not had any oozing or bleeding. Maritza does notice her arm to be edematous at times, but hasn't determined what makes that worse. The max areas of pain are on the inside of her upper arm, and the prominent vessels on her lower arm.  Maritza has noticed one area on her lower arm that feels \"lumpy\" but she never feels any hard of firm spots. Maritza does not take aspirin regularly, but does acknowledge taking Excedrin migraine prn with some aspirin in it. She sees her mental health provider for her headaches as well. She was recently started on Gabapentin (900mg at bedtime) and Topamax. Maritza has tried wearing compression on her right arm, but not recently. She felt that the compression made her arm throb, resulting in it feeling worse and not better. Maritza has also recently noticed an area on her right lower " back with a bluish hue and she's unsure if it's from lifting something or what it is. She doesn't have any pain at that site. She's unsure of how long it's been there.     Maritza has been in good health. She has not had any acute ill symptoms, including no cough, rhinorrhea, SOB, pharyngitis, mucositis, GI upset, rashes, or fever. She eats and sleeps well. Aside from discussing her arm pain, there are no concerns today.     History obtained from patient as well as the following historian: mother    ROS: comprehensive review of systems obtained; negative unless noted above in HPI.    Medications:  Current Outpatient Medications   Medication     Biotin 2500 MCG CAPS     busPIRone (BUSPAR) 15 MG tablet     calcium-vitamin D (CALTRATE) 600-400 MG-UNIT per tablet     FEXOFENADINE HCL PO     gabapentin (NEURONTIN) 300 MG capsule     ketoconazole (NIZORAL) 2 % shampoo     loratadine (CLARITIN) 10 MG tablet     LORYNA 3-0.02 MG tablet     meclizine (ANTIVERT) 25 MG tablet     pantoprazole (PROTONIX) 40 MG EC tablet     spironolactone (ALDACTONE) 50 MG tablet     sucralfate (CARAFATE) 1 GM tablet     topiramate (TOPAMAX) 50 MG tablet     triamcinolone (KENALOG) 0.025 % cream     DULoxetine (CYMBALTA) 20 MG capsule     HYDROcodone-acetaminophen (NORCO) 5-325 MG tablet     order for DME     oxyCODONE (ROXICODONE) 5 MG tablet     No current facility-administered medications for this visit.      PMH:  Past Medical History:   Diagnosis Date     Amenorrhea      Chronic low back pain      Gastroesophageal reflux disease      PONV (postoperative nausea and vomiting)      Sleep apnea      Venous malformation     right arm     Past Surgical History:  Past Surgical History:   Procedure Laterality Date     IR VEIN SPIDER NON FACE SCLEROTHERAPY  8/25/2020     IR VEIN SPIDER NON FACE SCLEROTHERAPY  9/25/2020     IR VEIN SPIDER NON FACE SCLEROTHERAPY  10/22/2020     IR VEIN SPIDER NON FACE SCLEROTHERAPY  12/15/2020     SCLEROTHERAPY       right arm, chest     Sedated MRI      age 6     SURGICAL RADIOLOGY PROCEDURE Right 12/15/2020    Procedure: Right Upper Extremity with sclerotherapy with Bleomycin;  Surgeon: Roseline Marcial MD;  Location:  [facility]     Allergies:  Allergies   Allergen Reactions     Adhesive Tape Blisters     Morphine Hives     Seasonal Allergies      Physical Exam:  Pulse:  [112] 112  BP: (127)/(82) 127/82    General: Well nourished, well developed without apparent distress  HEENT: Normocephalic. Full head of long hair. Eyes are non-injected without drainage. PEERL. Nares patient without drainage. TMs clear with positive landmarks. Oropharynx: uvula midline. No erythema, nor edema. No mucositis.  Chest: Symmetrical  Lungs: clear to bases bilaterally. No cough. No wheezing.   Heart: regular rate. No murmur  Abdomen: Soft, non-tender, No HSM.  Extremities/MSK: ULO with full ROM and good perfusion.   Right arm stacie diffuse, non-focal, prominent, superficial venous varicosities. This includes her radial palm, thenar eminence, volar forearm, medial arm extending into her RIGHT chest. Axilla appears uninvolved. This is all compressible; no obvious tenderness with moderate pressure. Intact sensibility through the entire hand/arm. Full motor function of this extremity. Warm, well-perfused. Right lower back with ovular, sightly raised, bluish hue. Stomach with 2 small flat circular lesions with blue hue.   Skin: (see above in MSK description) no other bumps, rashes, nor bruising.   Neuro: PERRL, cranial nerves II-XII grossly in tact.  : deferred.     Labs:  Results for orders placed or performed in visit on 04/07/21   CBC with platelets     Status: Abnormal   Result Value Ref Range    WBC 13.6 (H) 4.0 - 11.0 10e9/L    RBC Count 4.90 3.8 - 5.2 10e12/L    Hemoglobin 14.7 11.7 - 15.7 g/dL    Hematocrit 43.4 35.0 - 47.0 %    MCV 89 78 - 100 fl    MCH 30.0 26.5 - 33.0 pg    MCHC 33.9 31.5 - 36.5 g/dL    RDW 12.1  10.0 - 15.0 %    Platelet Count 270 150 - 450 10e9/L   INR     Status: None   Result Value Ref Range    INR 0.91 0.86 - 1.14   Fibrinogen activity     Status: Abnormal   Result Value Ref Range    Fibrinogen 459 (H) 200 - 420 mg/dL   D dimer quantitative     Status: None   Result Value Ref Range    D Dimer 0.4 0.0 - 0.50 ug/ml FEU   The following tests were ordered and interpreted by me today:  CBC and Other Coag studies    Assessment:  25 year old female with complex history of venous malformation of the RIGHT upper extremity status post multiple sessions of sclerotherapy (sotradecol and bleomycin) with recalcitrant pain with use. This is concerning for Glomuvenous malformation. Maritza was discussed in  multidisciplinary VLC today. Her pain is non-focal. Lesions on right arm were non-tender to palpation today. Maritza has chronic generalized discomfort; sclerotherapy has not improved pain thus far. Possibility that this is chronically activated from possible micro-emboli; coags look largely okay today. Newly noted area to lower right back and abdomen - difficult to determine if this is all connected on a deeper plain. Described headaches; followed by her mental health provider for this. Clinically well appearing. ASA and compression were subjectively not as helpful previously.     Plan:  1) Labs results reviewed. Could hold off on ASA for now, as that is the patient preference.   2) Dr. Bautista and Dr. Starkey to consider use of Nd-Yag laser for treatment  3) Will continue to follow coags  4) Continue all meds and recs from mental health provider. Would consider increasing gabapentin, favoring neuropathic pain  5) Reconsider compression, will continue to review this possibility at future visits  6) RTC is to be determined based on plan with Dermatology moving forward (laser v. Sclero v. Observation)    Total time spent on the following services on the date of the encounter:  Preparing to see patient, chart review,  review of outside records, Ordering medications, test, procedures, chemotherapy, Referring or communicating with other healthcare professionals, Interpretation of labs, imaging and other tests, Performing a medically appropriate examination , Counseling and educating the patient/family/caregiver , Documenting clinical information in the electronic or other health record , Communicating results to the patient/family/caregiver , Care coordination  and Total time spent: 90     Mily Persaud, CNP

## 2021-04-07 NOTE — LETTER
"  4/7/2021      RE: Maritza Gallardo  [address]  [facility] [address]       [facility] - Pediatric Hematology/Oncology    History:  25 year old female with complex history of venous malformation of the RIGHT upper extremity status post multiple sessions of sclerotherapy (sotradecol and bleomycin) with recalcitrant pain with use. There is no clear indication for surgical intervention at this time. Last MRI demonstrated that a significant component of this appears supra-fascial. Maritza is being seen today in the multidisciplinary [facility] Lesions [facility]. She is in clinic today on her own initially, and later, a friend joined.     HPI:   Maritza states that she has noticed the discoloration and intermittent pain for as long as she can remember. Her parents identified it when she was an infant and it's persisted. Maritza's biggest concern is the ongoing discomfort. She describes it as an ache with intermittent sharp pain, no necessarily a burning sensation. She has not had numbness or tingling. Maritza notices the pain to increase with use. She is right hand dominant, which leads to increased pain at times as well. Maritza has full function of her arm with ROM and dexterity. She has not had any oozing or bleeding. Maritza does notice her arm to be edematous at times, but hasn't determined what makes that worse. The max areas of pain are on the inside of her upper arm, and the prominent vessels on her lower arm.  Maritza has noticed one area on her lower arm that feels \"lumpy\" but she never feels any hard of firm spots. Maritza does not take aspirin regularly, but does acknowledge taking Excedrin migraine prn with some aspirin in it. She sees her mental health provider for her headaches as well. She was recently started on Gabapentin (900mg at bedtime) and Topamax. Maritza has tried wearing compression on her right arm, but not recently. She felt that the compression made her arm throb, resulting in " it feeling worse and not better. Maritza has also recently noticed an area on her right lower back with a bluish hue and she's unsure if it's from lifting something or what it is. She doesn't have any pain at that site. She's unsure of how long it's been there.     Maritza has been in good health. She has not had any acute ill symptoms, including no cough, rhinorrhea, SOB, pharyngitis, mucositis, GI upset, rashes, or fever. She eats and sleeps well. Aside from discussing her arm pain, there are no concerns today.     History obtained from patient as well as the following historian: mother    ROS: comprehensive review of systems obtained; negative unless noted above in HPI.    Medications:  Current Outpatient Medications   Medication     Biotin 2500 MCG CAPS     busPIRone (BUSPAR) 15 MG tablet     calcium-vitamin D (CALTRATE) 600-400 MG-UNIT per tablet     FEXOFENADINE HCL PO     gabapentin (NEURONTIN) 300 MG capsule     ketoconazole (NIZORAL) 2 % shampoo     loratadine (CLARITIN) 10 MG tablet     LORYNA 3-0.02 MG tablet     meclizine (ANTIVERT) 25 MG tablet     pantoprazole (PROTONIX) 40 MG EC tablet     spironolactone (ALDACTONE) 50 MG tablet     sucralfate (CARAFATE) 1 GM tablet     topiramate (TOPAMAX) 50 MG tablet     triamcinolone (KENALOG) 0.025 % cream     DULoxetine (CYMBALTA) 20 MG capsule     HYDROcodone-acetaminophen (NORCO) 5-325 MG tablet     order for DME     oxyCODONE (ROXICODONE) 5 MG tablet     No current facility-administered medications for this visit.      PMH:  Past Medical History:   Diagnosis Date     Amenorrhea      Chronic low back pain      Gastroesophageal reflux disease      PONV (postoperative nausea and vomiting)      Sleep apnea      Venous malformation     right arm     Past Surgical History:  Past Surgical History:   Procedure Laterality Date     IR VEIN SPIDER NON FACE SCLEROTHERAPY  8/25/2020     IR VEIN SPIDER NON FACE SCLEROTHERAPY  9/25/2020     IR VEIN SPIDER NON FACE  SCLEROTHERAPY  10/22/2020     IR VEIN SPIDER NON FACE SCLEROTHERAPY  12/15/2020     SCLEROTHERAPY      right arm, chest     Sedated MRI      age 6     SURGICAL RADIOLOGY PROCEDURE Right 12/15/2020    Procedure: Right Upper Extremity with sclerotherapy with Bleomycin;  Surgeon: Roseline Marcial MD;  Location:  [facility]     Allergies:  Allergies   Allergen Reactions     Adhesive Tape Blisters     Morphine Hives     Seasonal Allergies      Physical Exam:  Pulse:  [112] 112  BP: (127)/(82) 127/82    General: Well nourished, well developed without apparent distress  HEENT: Normocephalic. Full head of long hair. Eyes are non-injected without drainage. PEERL. Nares patient without drainage. TMs clear with positive landmarks. Oropharynx: uvula midline. No erythema, nor edema. No mucositis.  Chest: Symmetrical  Lungs: clear to bases bilaterally. No cough. No wheezing.   Heart: regular rate. No murmur  Abdomen: Soft, non-tender, No HSM.  Extremities/MSK: LUO with full ROM and good perfusion.   Right arm stacie diffuse, non-focal, prominent, superficial venous varicosities. This includes her radial palm, thenar eminence, volar forearm, medial arm extending into her RIGHT chest. Axilla appears uninvolved. This is all compressible; no obvious tenderness with moderate pressure. Intact sensibility through the entire hand/arm. Full motor function of this extremity. Warm, well-perfused. Right lower back with ovular, sightly raised, bluish hue. Stomach with 2 small flat circular lesions with blue hue.   Skin: (see above in MSK description) no other bumps, rashes, nor bruising.   Neuro: PERRL, cranial nerves II-XII grossly in tact.  : deferred.     Labs:  Results for orders placed or performed in visit on 04/07/21   CBC with platelets     Status: Abnormal   Result Value Ref Range    WBC 13.6 (H) 4.0 - 11.0 10e9/L    RBC Count 4.90 3.8 - 5.2 10e12/L    Hemoglobin 14.7 11.7 - 15.7 g/dL    Hematocrit 43.4  35.0 - 47.0 %    MCV 89 78 - 100 fl    MCH 30.0 26.5 - 33.0 pg    MCHC 33.9 31.5 - 36.5 g/dL    RDW 12.1 10.0 - 15.0 %    Platelet Count 270 150 - 450 10e9/L   INR     Status: None   Result Value Ref Range    INR 0.91 0.86 - 1.14   Fibrinogen activity     Status: Abnormal   Result Value Ref Range    Fibrinogen 459 (H) 200 - 420 mg/dL   D dimer quantitative     Status: None   Result Value Ref Range    D Dimer 0.4 0.0 - 0.50 ug/ml FEU   The following tests were ordered and interpreted by me today:  CBC and Other Coag studies    Assessment:  25 year old female with complex history of venous malformation of the RIGHT upper extremity status post multiple sessions of sclerotherapy (sotradecol and bleomycin) with recalcitrant pain with use. Maritza was discussed in  multidisciplinary VLC today. Her pain is non-focal. Lesions on right arm were non-tender to palpation today. Maritza has chronic generalized discomfort; sclerotherapy has not improved pain thus far. Possibility that this is chronically activated from possible micro-emboli; coags look largely okay today. Newly noted area to lower right back and abdomen - difficult to determine if this is all connected on a deeper plain. Described headaches; followed by her mental health provider for this. Clinically well appearing. ASA and compression were subjectively not as helpful previously.     Plan:  1) Labs results reviewed. Although largely reassuring, I do feel that she would benefit from ASA 81mg daily  2) Dr. Bautista and Dr. Starkey to consider use of laser for treatment  3) Will continue to follow coags  4) Continue all meds and recs from mental health provider. Would consider increasing gabapentin, favoring neuropathic pain, if ASA and/or laser doesn't off improvement in pain  5) Re-consider compression, will continue to review this possibility at future visits  6) RTC is to be determined based on plan with Dermatology moving forward (laser v. Sclero v.  Observation)    Total time spent on the following services on the date of the encounter:  Preparing to see patient, chart review, review of outside records, Ordering medications, test, procedures, chemotherapy, Referring or communicating with other healthcare professionals, Interpretation of labs, imaging and other tests, Performing a medically appropriate examination , Counseling and educating the patient/family/caregiver , Documenting clinical information in the electronic or other health record , Communicating results to the patient/family/caregiver , Care coordination  and Total time spent: 90     Mily Persaud, CNP

## 2021-04-07 NOTE — LETTER
"  4/7/2021      RE: Maritza Gallardo  [address]  [facility] [address]           INTERVENTIONAL RADIOLOGY CONSULTATION    Name: Maritza Gallardo  Age: 25 year old   Referring Physician: Dr. Zapien   REASON FOR REFERRAL: vascular malformation    HPI: Maritza returns for in person assessment.  She underwent serial sclerotherapy, total of 4 session in late 2020, without improvement in her pain. Pain is unchanged, significant and limits arm use. She continues intermittent ASA (excedrin) and Gabapentin. Given the exquisite tenderness and appearance, there is consideration for globulovenous malformation.  She is referred to our dermatology and surgical colleagues for further assessment.    No new medical conditions.  She has noticed some blue bumps forming on her abdomen and low back.    ROS:  Skin: as above  Ears/Nose/Throat: negative  Respiratory: No shortness of breath, dyspnea on exertion, cough, or hemoptysis  Cardiovascular: negative  Gastrointestinal: GERD, controlled with meds  Musculoskeletal: negative and as above  Neurologic: negative and as above  Psychiatric: negative  Endocrine: hirsuitism      Physical Examination:   VITALS:   Ht 1.644 m (5' 4.72\")   Wt 104.3 kg (229 lb 15 oz)   BMI 38.59 kg/m    Constitutional: alert and no distress  Head: Normocephalic.   Cardiovascular: No cyanosis  Respiratory: No cough or wheeze.  Nonlabored breathing.  Able to speak in complete sentences.    Psychiatric: affect normal/bright and mentation appears normal.  Skin: Few scattered subcentimeter blue nodular foci immediately deep to skin surface abdomen and low back  Extremity: Diffuse cobblestone, blue hued lesion visible throughout the palmar surface of the right forearm extending up the upper arm to axillary region and medial chest.        Diagnostic studies: Imaging reviewed by me.  MRI shows extensive T2 hyperintense lesion primarily in the subcutaneous tissues, but with mild extension and some muscle " throughout the right upper extremity.  The lesion is predominantly superficial.  There is enhancement, most consistent with slow flow, venous subtype malformation.    Assessment: 25-year-old female with history of extensive vascular malformation involving the right upper extremity and adjacent chest, also with new superficial blue nodular foci on the abdomen and low back.  Appearance, pain to touch, and clinical lack of response to sclerotherapy are most suggestive of glomulovenous malformation, which is confirmed by my dermatology colleagues, Dr. Starkey and Dr. Bautista. Given the failure of sclerotherapy at improving the symptoms, there is case report of significant improvement with laser treatment for glomulovenous malformations.  Dr. Bautista has personal experience in treating extensive glomulovenous malformations with dual Nd:YAG laser, and has recommended that treatment as the next step in management.     Plan:   1.  No further sclerotherapy planned as it did not provide relief of symptoms.  2.  Serial session laser therapy with Dr. Bautista.  3.  Coagulation studies are pending.    It was a pleasure to see Ms. Gallardo in clinic today.  Thank you for involving the interventional radiology service in her care.    I spent a total of 20 minutes face-to-face time with Ms. Gallardo and this in person visit, over 50% time was for counseling and care coordination.  On the day of clinic visit, I spent an additional 10 minutes reviewing imaging and previous records.    Roseline Marcial MD  Interventional Radiology   Pager [phone]        CC  Patient Care Team:  Julieta Zapien NP as PCP - Lucita Bergeron RN as Registered Nurse

## 2021-04-07 NOTE — LETTER
4/7/2021      RE: Maritza Gallardo  [address]  [facility] [address]       PLASTIC SURGERY HISTORY AND PHYSICAL    Maritza Gallardo MRN# [id number]   Age: 25 year old YOB: 1995     CHIEF COMPLAINT: RIGHT upper extremity venous malformation    HPI: 25 year old lady with history of RIGHT upper extremity venous malformation presents for vascular anomalies team evaluation. Per patient, she states that her parents noticed something different with her RUE when she was a baby. She reports interim worsening of the prominence, color, and discomfort. Her primary complaint today is diffuse pain with any use of her arm. She previously trialed compression sleeves which were actually more painful to apply. In terms of oral medications, she takes a prn headache medication (near daily) which has an aspirin component. She does not endorse any improvement from this. She reports undergoing around 7 sessions of sclerotherapy. She does not believe that this has improved her pain. Her family says that the prominence of the malformations as decreased from this. Her worse pain is at the distal forearm and upper arm, relatively less pain at the proximal forearm. Denies numbness, weakness of her RUE/hand.    PMH:  Past Medical History:   Diagnosis Date     Amenorrhea      Chronic low back pain      Gastroesophageal reflux disease      PONV (postoperative nausea and vomiting)      Sleep apnea      Venous malformation     right arm       PSH:  Past Surgical History:   Procedure Laterality Date     IR VEIN SPIDER NON FACE SCLEROTHERAPY  8/25/2020     IR VEIN SPIDER NON FACE SCLEROTHERAPY  9/25/2020     IR VEIN SPIDER NON FACE SCLEROTHERAPY  10/22/2020     IR VEIN SPIDER NON FACE SCLEROTHERAPY  12/15/2020     SCLEROTHERAPY      right arm, chest     Sedated MRI      age 6     SURGICAL RADIOLOGY PROCEDURE Right 12/15/2020    Procedure: Right Upper Extremity with sclerotherapy with Bleomycin;  Surgeon: Roseline Marcial  MD Chacha;  Location:  [facility]       FH:  No family history on file.    SH:  Social History     Tobacco Use     Smoking status: Heavy Tobacco Smoker     Packs/day: 0.50     Types: Cigarettes     Smokeless tobacco: Never Used   Substance Use Topics     Alcohol use: Yes     Comment: rarely     Drug use: Yes     Types: Marijuana     Comment: medical -vape       MEDS:  Current Outpatient Medications   Medication     Biotin 2500 MCG CAPS     busPIRone (BUSPAR) 15 MG tablet     calcium-vitamin D (CALTRATE) 600-400 MG-UNIT per tablet     FEXOFENADINE HCL PO     gabapentin (NEURONTIN) 300 MG capsule     ketoconazole (NIZORAL) 2 % shampoo     loratadine (CLARITIN) 10 MG tablet     LORYNA 3-0.02 MG tablet     meclizine (ANTIVERT) 25 MG tablet     pantoprazole (PROTONIX) 40 MG EC tablet     spironolactone (ALDACTONE) 50 MG tablet     sucralfate (CARAFATE) 1 GM tablet     topiramate (TOPAMAX) 50 MG tablet     triamcinolone (KENALOG) 0.025 % cream     DULoxetine (CYMBALTA) 20 MG capsule     HYDROcodone-acetaminophen (NORCO) 5-325 MG tablet     order for DME     oxyCODONE (ROXICODONE) 5 MG tablet     No current facility-administered medications for this visit.        ALLERGIES:     Allergies   Allergen Reactions     Adhesive Tape Blisters     Morphine Hives     Seasonal Allergies        ROS: Negative except for HPI.    EXAM:  No acute distress  Regular  Nonlabored  FOCUSED EXAMINATION OF HER RIGHT UPPER EXTREMITY demonstrates diffuse, non-focal, prominent, superficial venous varicosities. This includes her radial palm, thenar eminence, volar forearm, medial arm extending into her RIGHT chest. Axilla appears uninvolved. This is all compressible; no obvious tenderness with moderate pressure. Intact sensibility through the entire hand/arm. Full motor function of this extremity  Warm, well-perfused                      ASSESSMENT/PLAN:  25 year old lady with complex history of venous malformation of the RIGHT  upper extremity status post multiple sessions of sclerotherapy with recalcitrant pain with use. There is no clear indication for surgical intervention at this time. Last MRI demonstrated that a significant component of this appears supra-fascial; however, its non-focal nature and her pain complaints do not offer a clear, anatomic reasoning for her pain. Surgical excision would not be feasible en bloc and would require multiple piece meal approaches. Excision would also involve risk of poor wound healing, bleeding, worsening pain, and injury to neurovascular structures. Recommend continuing non-surgical measures including topical modalities prior to re-consideration of surgical excision.  Patient is agreeable to this.    Total time spent with for this visit was at least 30 minutes, including review of documentation, counseling/discussion with patient, documentation, and organizing any potential follow-up.     Magdalena Pratt MD  Pediatric Cleft and Craniofacial Surgery  Division of Plastic Surgery  [facility]  Pager: [phone]

## 2021-04-07 NOTE — PATIENT INSTRUCTIONS
[facility]- Pediatric Dermatology  Dr. Elaine Coon, Dr. Ghislaine Bautista, Dr. Linda Starkey, Erika Molina, PETERSON Rowley, Dr. Judy Goldstein & Dr. Junior Harvey       Non Urgent  Nurse Triage Line; [phone]- Anisha and Valentina [facility] Care Coordinators      Marilee (/Complex ) [phone]      If you need a prescription refill, please contact your pharmacy. Refills are approved or denied by our Physicians during normal business hours, Monday through Fridays    Per office policy, refills will not be granted if you have not been seen within the past year (or sooner depending on your child's condition)      Scheduling Information:     Pediatric Appointment Scheduling and Call Center [phone]   Radiology Scheduling- [phone]     Sedation Unit Scheduling- [phone]    [facility] Scheduling- [facility] [phone]; Pediatric Dermatology [phone]    Main  Services: [phone]   Lao: [phone]   Ukrainian: [phone]   Hmong/Wolof/Hebrew: [phone]      Preadmission Nursing Department Fax Number: [phone] (Fax all pre-operative paperwork to this number)      For urgent matters arising during evenings, weekends, or holidays that cannot wait for normal business hours please call [phone] and ask for the Dermatology Resident On-Call to be paged.

## 2021-04-07 NOTE — LETTER
4/7/2021      RE: Maritza Gallardo  [address] [address]       [facility]  Dermatology Patient Visit Note  The goal of this multi-specialty clinic is to provide comprehensive care for children and adults with complex vascular lesions. Imaging studies and patient history are reviewed together by the group just prior to the patient clinic visit.  Participating specialists include:    ENT: Dr. Dudley Ward   General surgery: Dr. Ramy Casiano and Kj Sawyer  Plastic Surgery: Dr. Jose Luis Matamoros  Heme/Onc: Dr. Nery Galicia   Ob/Gyn: Dr. Linda Pritchard  Pediatric Dermatology: Emma Coon, Linda Starkey, Ghislaine Bautista  Interventional Radiology: Dr. Roseline Marcial  Radiology: Dr. Mike Bustos  Genetics: Dr. Trejo    Assessment and Plan  1. 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 Multifocal lesions noted on the abdomen and the R lower back. In most cases this is due to autosomal dominant mutation in the glomulin gene, but may be sporadic. Lesions are often symptomatic as is the case for Ms. Gallardo. Based on lack of improvement with sclerotherapy we will plan for treatment with a series of laser procedures with Nd-Yag. Surgical intervention could also be considered, but this is likely a more morbid procedure. Coagulation studies today to assess for low level coagulopathy, but this is unlikely in GVM.   -Nd-Yag laser treatment under sedation.     RTC for sedated laser treatment.     Thank you for involving me in this patient's care.     Linda Starkey MD   of Dermatology  [facility]    CC: Julieta Zapien NP  [facility]  [address] [place] [address]    Julieta Zapien NP    ____________________________________________________________________________________________________________________________________________    CC: Patient presents with:  ELLEN: mynor      HPI: Maritza KELLY  Paulina is a 25 year old female presenting for initial evaluation of a vascular malformation of the R upp;er arm. This is her first visit to vascular lesion clinic. She notes that she was born with the birthmark, but it is getting more symptomatic over time with aching and intermittent sharp pain. She had MRI of the arm that showed slow flow vasculature with mostly soft tissue involvement, but also some involvement in the R biceps. She has been treated with compression which she feels offered no benefit. She takes excedrin most days and does not think that this helps with pain. She is s/p 4 episodes of sclerotherapy with sotradechol and bleomycin. Despite evidence of improvement in imaging, she denies improved arm discomfort. She is R handed. She is currently taking gabapentin for sleep (900 mg at bedtime) which makes her tired.       Patient Active Problem List   Diagnosis     Vascular malformation     Allergic rhinitis, seasonal     Chronic pain disorder     Current moderate episode of major depressive disorder (H)     Diarrhea     GERD (gastroesophageal reflux disease)     Generalized anxiety disorder     Left knee pain     Lower abdominal pain     Migraine variant     Mucus in stool     Coffee ground emesis     Hematemesis     Non-intractable vomiting with nausea     Obesity (BMI 35.0-39.9 without comorbidity)     Other insomnia     Pilonidal cyst     Morbid obesity (H)       Allergies   Allergen Reactions     Adhesive Tape Blisters     Morphine Hives     Seasonal Allergies          Current Outpatient Medications   Medication     Biotin 2500 MCG CAPS     busPIRone (BUSPAR) 15 MG tablet     calcium-vitamin D (CALTRATE) 600-400 MG-UNIT per tablet     FEXOFENADINE HCL PO     gabapentin (NEURONTIN) 300 MG capsule     ketoconazole (NIZORAL) 2 % shampoo     loratadine (CLARITIN) 10 MG tablet     LORYNA 3-0.02 MG tablet     meclizine (ANTIVERT) 25 MG tablet     order for DME     pantoprazole (PROTONIX) 40 MG EC tablet  "    spironolactone (ALDACTONE) 50 MG tablet     sucralfate (CARAFATE) 1 GM tablet     topiramate (TOPAMAX) 50 MG tablet     triamcinolone (KENALOG) 0.025 % cream     DULoxetine (CYMBALTA) 20 MG capsule     HYDROcodone-acetaminophen (NORCO) 5-325 MG tablet     oxyCODONE (ROXICODONE) 5 MG tablet     No current facility-administered medications for this visit.        No family history on file.    Social History     Tobacco Use     Smoking status: Heavy Tobacco Smoker     Packs/day: 0.50     Types: Cigarettes     Smokeless tobacco: Never Used   Substance Use Topics     Alcohol use: Yes     Comment: rarely     Drug use: Yes     Types: Marijuana     Comment: medical -vape       ROS: Patient in normal state of health and is feeling well on complete ROS.     EXAM:  /82   Pulse 112   Ht 5' 4.72\" (164.4 cm)   Wt 104.3 kg (229 lb 15 oz)   BMI 38.59 kg/m    GEN: Alert, no distress  HEENT: Conjunctiva clear.   PULM: Breathing comfortably on RA  CV: Extrem warm and well perfused  ABD: No distension  MS: No joint deformity  Psych: Normal mood/affect  NEURO: A/O x3  SKIN:   --Erosions on the R cheek, forehead, R upper arm  --R anterior chest extending down the R medial arm is a linear array of blue subcutaneous papules and nodules firm to palpation without complete blanching with pressure. Non tender  --Scattered light brown patches on the mediial upper arm in areas of past sclerotx.   --R lower back with a blue hued vascular plaque approx 3 cm  --Scattered blue hued papules on the R mid abdomen          Linda Starkey MD  "

## 2021-04-07 NOTE — NURSING NOTE
"[id number]  Chief Complaint   Patient presents with     RECHECK     vlc     Initial Ht 5' 4.72\" (164.4 cm)   Wt 229 lb 15 oz (104.3 kg)   BMI 38.59 kg/m   Estimated body mass index is 38.59 kg/m  as calculated from the following:    Height as of this encounter: 5' 4.72\" (164.4 cm).    Weight as of this encounter: 229 lb 15 oz (104.3 kg).  Medication Reconciliation: complete   Karen Santos LPN      "

## 2021-04-07 NOTE — NURSING NOTE
"[facility] [[id number]]  Chief Complaint   Patient presents with     Consult     c     Initial /82 (BP Location: Right arm, Patient Position: Sitting, Cuff Size: Adult Large)   Pulse 112   Ht 5' 4.72\" (164.4 cm)   Wt 229 lb 15 oz (104.3 kg)   BMI 38.59 kg/m   Estimated body mass index is 38.59 kg/m  as calculated from the following:    Height as of this encounter: 5' 4.72\" (164.4 cm).    Weight as of this encounter: 229 lb 15 oz (104.3 kg).  Medication Reconciliation: complete   Karen Santos LPN      "

## 2021-04-07 NOTE — NURSING NOTE
"[facility] [[id number]]  Chief Complaint   Patient presents with     RECHECK     vlc     Initial /82   Pulse 112   Ht 5' 4.72\" (164.4 cm)   Wt 229 lb 15 oz (104.3 kg)   BMI 38.59 kg/m   Estimated body mass index is 38.59 kg/m  as calculated from the following:    Height as of this encounter: 5' 4.72\" (164.4 cm).    Weight as of this encounter: 229 lb 15 oz (104.3 kg).  Medication Reconciliation: complete   Karen Santos LPN      "

## 2021-04-07 NOTE — PATIENT INSTRUCTIONS
[facility]- Pediatric Dermatology  Dr. Elaine Coon, Dr. Ghislaine Bautista, Dr. Linda Starkey, Erika Molina, PETERSON Rowley, Dr. Judy Goldstein & Dr. Junior Harvey       Non Urgent  Nurse Triage Line; [phone]- Anisha and Valentina MERA Care Coordinators      Marilee (/Complex ) [phone]      If you need a prescription refill, please contact your pharmacy. Refills are approved or denied by our Physicians during normal business hours, Monday through Fridays    Per office policy, refills will not be granted if you have not been seen within the past year (or sooner depending on your child's condition)      Scheduling Information:     Pediatric Appointment Scheduling and Call Center [phone]   Radiology Scheduling- [phone]     Sedation Unit Scheduling- [phone]    [place] Scheduling- [facility] [phone]; Pediatric Dermatology [phone]    Main  Services: [phone]   Irish: [phone]   Mauritian: [phone]   Hmong/Persian/Slovak: [phone]      Preadmission Nursing Department Fax Number: [phone] (Fax all pre-operative paperwork to this number)      For urgent matters arising during evenings, weekends, or holidays that cannot wait for normal business hours please call [phone] and ask for the Dermatology Resident On-Call to be paged.

## 2021-04-07 NOTE — PROGRESS NOTES
PLASTIC SURGERY HISTORY AND PHYSICAL    Maritza Gallardo MRN# [id number]   Age: 25 year old YOB: 1995     CHIEF COMPLAINT: RIGHT upper extremity venous malformation    HPI: 25 year old lady with history of RIGHT upper extremity venous malformation presents for vascular anomalies team evaluation. Per patient, she states that her parents noticed something different with her RUE when she was a baby. She reports interim worsening of the prominence, color, and discomfort. Her primary complaint today is diffuse pain with any use of her arm. She previously trialed compression sleeves which were actually more painful to apply. In terms of oral medications, she takes a prn headache medication (near daily) which has an aspirin component. She does not endorse any improvement from this. She reports undergoing around 7 sessions of sclerotherapy. She does not believe that this has improved her pain. Her family says that the prominence of the malformations as decreased from this. Her worse pain is at the distal forearm and upper arm, relatively less pain at the proximal forearm. Denies numbness, weakness of her RUE/hand.    PMH:  Past Medical History:   Diagnosis Date     Amenorrhea      Chronic low back pain      Gastroesophageal reflux disease      PONV (postoperative nausea and vomiting)      Sleep apnea      Venous malformation     right arm       PSH:  Past Surgical History:   Procedure Laterality Date     IR VEIN SPIDER NON FACE SCLEROTHERAPY  8/25/2020     IR VEIN SPIDER NON FACE SCLEROTHERAPY  9/25/2020     IR VEIN SPIDER NON FACE SCLEROTHERAPY  10/22/2020     IR VEIN SPIDER NON FACE SCLEROTHERAPY  12/15/2020     SCLEROTHERAPY      right arm, chest     Sedated MRI      age 6     SURGICAL RADIOLOGY PROCEDURE Right 12/15/2020    Procedure: Right Upper Extremity with sclerotherapy with Bleomycin;  Surgeon: Roseline Marcial MD;  Location:  [facility]       FH:  No family history on  file.    SH:  Social History     Tobacco Use     Smoking status: Heavy Tobacco Smoker     Packs/day: 0.50     Types: Cigarettes     Smokeless tobacco: Never Used   Substance Use Topics     Alcohol use: Yes     Comment: rarely     Drug use: Yes     Types: Marijuana     Comment: medical -vape       MEDS:  Current Outpatient Medications   Medication     Biotin 2500 MCG CAPS     busPIRone (BUSPAR) 15 MG tablet     calcium-vitamin D (CALTRATE) 600-400 MG-UNIT per tablet     FEXOFENADINE HCL PO     gabapentin (NEURONTIN) 300 MG capsule     ketoconazole (NIZORAL) 2 % shampoo     loratadine (CLARITIN) 10 MG tablet     LORYNA 3-0.02 MG tablet     meclizine (ANTIVERT) 25 MG tablet     pantoprazole (PROTONIX) 40 MG EC tablet     spironolactone (ALDACTONE) 50 MG tablet     sucralfate (CARAFATE) 1 GM tablet     topiramate (TOPAMAX) 50 MG tablet     triamcinolone (KENALOG) 0.025 % cream     DULoxetine (CYMBALTA) 20 MG capsule     HYDROcodone-acetaminophen (NORCO) 5-325 MG tablet     order for DME     oxyCODONE (ROXICODONE) 5 MG tablet     No current facility-administered medications for this visit.        ALLERGIES:     Allergies   Allergen Reactions     Adhesive Tape Blisters     Morphine Hives     Seasonal Allergies        ROS: Negative except for HPI.    EXAM:  No acute distress  Regular  Nonlabored  FOCUSED EXAMINATION OF HER RIGHT UPPER EXTREMITY demonstrates diffuse, non-focal, prominent, superficial venous varicosities. This includes her radial palm, thenar eminence, volar forearm, medial arm extending into her RIGHT chest. Axilla appears uninvolved. This is all compressible; no obvious tenderness with moderate pressure. Intact sensibility through the entire hand/arm. Full motor function of this extremity  Warm, well-perfused                      ASSESSMENT/PLAN:  25 year old lady with complex history of venous malformation of the RIGHT upper extremity status post multiple sessions of sclerotherapy with recalcitrant pain  with use. There is no clear indication for surgical intervention at this time. Last MRI demonstrated that a significant component of this appears supra-fascial; however, its non-focal nature and her pain complaints do not offer a clear, anatomic reasoning for her pain. Surgical excision would not be feasible en bloc and would require multiple piece meal approaches. Excision would also involve risk of poor wound healing, bleeding, worsening pain, and injury to neurovascular structures. Recommend continuing non-surgical measures including topical modalities prior to re-consideration of surgical excision.  Patient is agreeable to this.    Total time spent with for this visit was at least 30 minutes, including review of documentation, counseling/discussion with patient, documentation, and organizing any potential follow-up.     Magdalena Pratt MD  Pediatric Cleft and Craniofacial Surgery  Division of Plastic Surgery  [facility]  Pager: [phone]

## 2021-04-07 NOTE — NURSING NOTE
"[facility] [[id number]]  Chief Complaint   Patient presents with     Consult     [facility]     Initial Ht 5' 4.72\" (164.4 cm)   Wt 229 lb 15 oz (104.3 kg)   BMI 38.59 kg/m   Estimated body mass index is 38.59 kg/m  as calculated from the following:    Height as of this encounter: 5' 4.72\" (164.4 cm).    Weight as of this encounter: 229 lb 15 oz (104.3 kg).  Medication Reconciliation: complete   Karen Santos LPN      "

## 2021-04-10 NOTE — PROGRESS NOTES
[facility]  Dermatology Patient Visit Note  The goal of this multi-specialty clinic is to provide comprehensive care for children and adults with complex vascular lesions. Imaging studies and patient history are reviewed together by the group just prior to the patient clinic visit.  Participating specialists include:    ENT: Dr. Dudley Ward   General surgery: Dr. Ramy Casiano and Kj Sawyer  Plastic Surgery: Dr. Jose Luis Matamoros  Heme/Onc: Dr. Nery Galicia   Ob/Gyn: Dr. Linda Pritchard  Pediatric Dermatology: Emma Coon, Linda Starkey, Ghislaine Bautista  Interventional Radiology: Dr. Roseline Marcial  Radiology: Dr. Mike Bustos  Genetics: Dr. Trejo    Assessment and Plan  1. 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 Multifocal lesions noted on the abdomen and the R lower back. In most cases this is due to autosomal dominant mutation in the glomulin gene, but may be sporadic. Lesions are often symptomatic as is the case for Ms. Gallardo. Based on lack of improvement with sclerotherapy we will plan for treatment with a series of laser procedures with Nd-Yag. Surgical intervention could also be considered, but this is likely a more morbid procedure. Coagulation studies today to assess for low level coagulopathy, but this is unlikely in GVM.   -Nd-Yag laser treatment under sedation.     RTC for sedated laser treatment.     Thank you for involving me in this patient's care.     Linda Starkey MD   of Dermatology  [facility]    CC: Julieta Zapien NP  [facility]  [address] [place] [address]    Julieta Zapien NP    ____________________________________________________________________________________________________________________________________________    CC: Patient presents with:  RECHECK: vlc      HPI: Maritza KELLY Paulina is a 25 year old female presenting for initial evaluation of a vascular  malformation of the R upp;er arm. This is her first visit to vascular lesion clinic. She notes that she was born with the birthmark, but it is getting more symptomatic over time with aching and intermittent sharp pain. She had MRI of the arm that showed slow flow vasculature with mostly soft tissue involvement, but also some involvement in the R biceps. She has been treated with compression which she feels offered no benefit. She takes excedrin most days and does not think that this helps with pain. She is s/p 4 episodes of sclerotherapy with sotradechol and bleomycin. Despite evidence of improvement in imaging, she denies improved arm discomfort. She is R handed. She is currently taking gabapentin for sleep (900 mg at bedtime) which makes her tired.       Patient Active Problem List   Diagnosis     Vascular malformation     Allergic rhinitis, seasonal     Chronic pain disorder     Current moderate episode of major depressive disorder (H)     Diarrhea     GERD (gastroesophageal reflux disease)     Generalized anxiety disorder     Left knee pain     Lower abdominal pain     Migraine variant     Mucus in stool     Coffee ground emesis     Hematemesis     Non-intractable vomiting with nausea     Obesity (BMI 35.0-39.9 without comorbidity)     Other insomnia     Pilonidal cyst     Morbid obesity (H)       Allergies   Allergen Reactions     Adhesive Tape Blisters     Morphine Hives     Seasonal Allergies          Current Outpatient Medications   Medication     Biotin 2500 MCG CAPS     busPIRone (BUSPAR) 15 MG tablet     calcium-vitamin D (CALTRATE) 600-400 MG-UNIT per tablet     FEXOFENADINE HCL PO     gabapentin (NEURONTIN) 300 MG capsule     ketoconazole (NIZORAL) 2 % shampoo     loratadine (CLARITIN) 10 MG tablet     LORYNA 3-0.02 MG tablet     meclizine (ANTIVERT) 25 MG tablet     order for DME     pantoprazole (PROTONIX) 40 MG EC tablet     spironolactone (ALDACTONE) 50 MG tablet     sucralfate (CARAFATE) 1 GM  "tablet     topiramate (TOPAMAX) 50 MG tablet     triamcinolone (KENALOG) 0.025 % cream     DULoxetine (CYMBALTA) 20 MG capsule     HYDROcodone-acetaminophen (NORCO) 5-325 MG tablet     oxyCODONE (ROXICODONE) 5 MG tablet     No current facility-administered medications for this visit.        No family history on file.    Social History     Tobacco Use     Smoking status: Heavy Tobacco Smoker     Packs/day: 0.50     Types: Cigarettes     Smokeless tobacco: Never Used   Substance Use Topics     Alcohol use: Yes     Comment: rarely     Drug use: Yes     Types: Marijuana     Comment: medical -vape       ROS: Patient in normal state of health and is feeling well on complete ROS.     EXAM:  /82   Pulse 112   Ht 5' 4.72\" (164.4 cm)   Wt 104.3 kg (229 lb 15 oz)   BMI 38.59 kg/m    GEN: Alert, no distress  HEENT: Conjunctiva clear.   PULM: Breathing comfortably on RA  CV: Extrem warm and well perfused  ABD: No distension  MS: No joint deformity  Psych: Normal mood/affect  NEURO: A/O x3  SKIN:   --Erosions on the R cheek, forehead, R upper arm  --R anterior chest extending down the R medial arm is a linear array of blue subcutaneous papules and nodules firm to palpation without complete blanching with pressure. Non tender  --Scattered light brown patches on the mediial upper arm in areas of past sclerotx.   --R lower back with a blue hued vascular plaque approx 3 cm  --Scattered blue hued papules on the R mid abdomen                  "

## 2021-04-13 LAB — PRO FRG1+2 SERPL-SCNC: 0.15 NMOL/L

## 2021-04-15 ENCOUNTER — TELEPHONE (OUTPATIENT)
Dept: DERMATOLOGY | Facility: CLINIC | Age: 26
End: 2021-04-15

## 2021-04-15 NOTE — TELEPHONE ENCOUNTER
[facility] Call Center    Phone Message    May a detailed message be left on voicemail: yes     Reason for Call: Symptoms or Concerns     If patient has red-flag symptoms, warm transfer to triage line    Current symptom or concern: patient is calling because she has some questions regarding her upcoming procedure with Dr Bautista.     She would like a call back please.         Action Taken: Other: derm    Travel Screening: Not Applicable

## 2021-04-15 NOTE — TELEPHONE ENCOUNTER
Thank you Anisha-  Please advise that we would be happy to complete any FMLA forms for work absence or restrictions related to activities from symptoms of the vascular malformation. We commonly fill out this paperwork so that people with vascular malformations are able to still work with special accommodations.  Employers are not able to discriminate in hiring practices due to physical disabilities. If you find that this is the case, please reach out to the [place] Department of Human Rights who would investigate this.

## 2021-04-15 NOTE — TELEPHONE ENCOUNTER
"Returned phone call to Maritza, pt explained she was aware of the need for a pre op check at PCP but inquired if there was anything else needed? RN explained she will need a COVID19 Nasal PCR completed and resulted within 4 days prior to each procedure. RN also explained pt will need a new pre op completed within 30 days of each scheduled procedure. Pt currently scheduled for NG Yag in May, June and July with Unique Bautista and Dr. Starkey. RN explained she would mail pt information about the sedation. RN did provide pt with the fax number to the pre admissions nurse as well during conversation. Maritza was agreeable. Letter mailed to pts home with sedation information.     Pt inquired to RN \"if I could get a letter from the doctors for disability or temporary disability?' RN inquired to Maritza if she was seeking a letter to support her need for disability for the NG YAG laser treatments? RN confirmed if any other providers were completing procedures at this time, pt denied. RN explained she would follow up with Unique Starkey regarding this request but cautioned this request may be unlikely because the sedative procedures are same day, with resuming of normal activities the following day (and sun restrictions). RN explained besides the day of the procedure, which sedation is required, pt would be able to return to work. RN explained FMLA may be an option to support the need for time off on the procedure days. Pt stated, \"I have a hard time doing things with my arm and no one here will hire me with having to take time off work.\" RN explained she would follow up with Unique Starkey for her recommendations and then would contact pt back. Pt was agreeable and denied questions or concerns. Information routed to Dr. Starkey to advise.   "

## 2021-04-15 NOTE — LETTER
April 15, 2021      TO: Maritza Gallardo  [address]  [place] [address]         Dear Maritza,    Below is the information and requirements to our sedation unit. The pre-op and COVID19 testing is required prior to each procedure.    Please read over the information, please call the clinic with any questions or concerns prior to your scheduled appointments at [phone].    Sincerely,    Bri Schwab, RN Care Coordinator  Pediatric Dermatology Clinic  [facility]     Pediatric Dermatology  [place] [address], Clinic [address] [place] [id number] [phone]    PEDIATRIC SEDATION UNIT- Excision, Procedures, Sedated Imaging and Sedated Pulsed Dye Laser Therapy     What do I need to know?    A complete a history and physical (pre op) within 30 days at your primary care clinic needs to be completed and faxed it to the Preadmission Nursing Department Fax Number: [phone]     There is no blood work that is needed for this procedure.     You will arrive and check into the Children s Imaging and Sedation desk, 1 hour prior to your scheduled procedure time.    Diet Restrictions    Patients of all ages may have clear liquids until 2 hours prior to your scheduled arrival time. Clear liquids include water, PedialyteR, GatoradeR, apple juice or liquids that one can read through. (Any liquids containing milk are not clear liquids).    No milk or food is allowed 6 hours prior to your scheduled arrival time  o If diet restrictions are not followed, your case may be cancelled and rescheduled.   Location    Sedation unit is located on the 2nd floor (street level) near pediatric radiology department in the [facility] building (adjacent to the [facility]). Sedation Unit Phone number: [phone]. [address] [place] [address]. You can  park or park in the Green parking ramp.       COVID requirements:     It is required that you get  a  "COVID test no sooner than 4 days prior to your procedure.  Testing here at an [facility] facility (there are multiple locations) is resulted within that time frame.  A rapid testing for patients such as yourself to get tested \"day of\" procedure is in the making, but unfortunately it is not up and running yet.  The Covid team member will contact you about 5 to 7 days prior to procedure to help you schedule a test at a facility that is acceptable to you.      Please call the COVID scheduling team at [phone], if you need further assistance in getting testing scheduled or more information on testing sites.     There is a risk of your treatment/procedure being rescheduled if you are Symptomatic, have a positive covid or no covid result. In other words it is recommended you get tested somewhere that the results are back in time.        You will arrive the day of your procedure and check into the Children's Sedation and Observation Unit on the [facility] of the [facility]. Once you are checked in, you will be brought back to your private room, where a bed and TV is available for your child. Your nurse will come in and introduce herself. Your Pediatric Dermatologist will come in and review the procedure with you. You may ask any last minute questions during this time. You will also meet the anesthesiologist who will be working with your child that day. An IV will be placed by the nursing staff, and only used during the procedure but is required. Once the provider and Sedation room is ready your child along with you will be brought back to the procedure room prior to him/her being put to sleep. You will be able to wait in your private assigned room during the procedure.    The two most common medications used during a procedure are Propofol and Fentanyl. With the use of these medications; there is usually no need for a tube placement for airway access, so your child is able to breathe on their own during " "the procedure. Occasionally, supplemental oxygen is provided temporarily. Your child's vital signs are also monitored continually throughout this process.    Your child will be under anesthesia for around 10 minutes for pulsed dye laser treatment, depending on the size of the birthmark. For an excision or other procedures, your child may be under anesthesia for about 20-30 minutes or sometimes longer. Once the procedure is complete your child will be brought back into their private room where you have been waiting. Children tend to wake up rather quickly. It is not uncommon for him/her to be upset and or confused during the \"wake up\" period. Once awake, your child will receive liquids/you will be able to feed him/her, or a meal will be provided (selected by the child prior to the procedure).    If you are questioning insurance coverage, we encourage you to contact your insurance company regarding your out of pocket responsibility. Please contact our clinic if your insurance company requires pre authorization or pre determination prior to the procedure. We will submit the required documentation and inform you of the approval or denial. If a pre authorization or pre determination is required please call the clinic triage line at [phone] and leave your child's full name (including spelling), date of birth and the information the insurance company told you are required. We will contact your insurance company at that time when directed by you.     Child and Family Life Resources:  \"Discover Passport to [facility]\", is a smartphone river designed to help patients and families prepare for their healthcare experience at [facility].     The river is free to download on any smart phone though Play Store on your EVERFANS or the Apple iTunes Store.  How to download: search-passport [facility]- [facility].      Created by certified child life " specialists, the river gives patients and their families and healthcare provider's access to child-friendly information on common medical procedures, tips on preparing for a hospital stay and resources for families.     The river includes five sections:  Virtual Tours, Procedure Preparation, Family Resources, Preparing for your Hospital Stay and the Coloring Board. This river can also be used by patients, families and caregivers outside of the [facility]. The Procedure Preparation section and Coloring Board are universal tools designated to be useful for children everywhere.

## 2021-04-19 NOTE — PROGRESS NOTES
"    INTERVENTIONAL RADIOLOGY CONSULTATION    Name: Maritza Gallardo  Age: 25 year old   Referring Physician: Dr. Zapien   REASON FOR REFERRAL: vascular malformation    HPI: Maritza returns for in person assessment.  She underwent serial sclerotherapy, total of 4 session in late 2020, without improvement in her pain. Pain is unchanged, significant and limits arm use. She continues intermittent ASA (excedrin) and Gabapentin. Given the exquisite tenderness and appearance, there is consideration for globulovenous malformation.  She is referred to our dermatology and surgical colleagues for further assessment.    No new medical conditions.  She has noticed some blue bumps forming on her abdomen and low back.    ROS:  Skin: as above  Ears/Nose/Throat: negative  Respiratory: No shortness of breath, dyspnea on exertion, cough, or hemoptysis  Cardiovascular: negative  Gastrointestinal: GERD, controlled with meds  Musculoskeletal: negative and as above  Neurologic: negative and as above  Psychiatric: negative  Endocrine: hirsuitism      Physical Examination:   VITALS:   Ht 1.644 m (5' 4.72\")   Wt 104.3 kg (229 lb 15 oz)   BMI 38.59 kg/m    Constitutional: alert and no distress  Head: Normocephalic.   Cardiovascular: No cyanosis  Respiratory: No cough or wheeze.  Nonlabored breathing.  Able to speak in complete sentences.    Psychiatric: affect normal/bright and mentation appears normal.  Skin: Few scattered subcentimeter blue nodular foci immediately deep to skin surface abdomen and low back  Extremity: Diffuse cobblestone, blue hued lesion visible throughout the palmar surface of the right forearm extending up the upper arm to axillary region and medial chest.        Diagnostic studies: Imaging reviewed by me.  MRI shows extensive T2 hyperintense lesion primarily in the subcutaneous tissues, but with mild extension and some muscle throughout the right upper extremity.  The lesion is predominantly superficial.  There is " enhancement, most consistent with slow flow, venous subtype malformation.    Assessment: 25-year-old female with history of extensive vascular malformation involving the right upper extremity and adjacent chest, also with new superficial blue nodular foci on the abdomen and low back.  Appearance, pain to touch, and clinical lack of response to sclerotherapy are most suggestive of glomulovenous malformation, which is confirmed by my dermatology colleagues, Dr. Starkey and Dr. Bautista. Given the failure of sclerotherapy at improving the symptoms, there is case report of significant improvement with laser treatment for glomulovenous malformations.  Dr. Bautista has personal experience in treating extensive glomulovenous malformations with dual Nd:YAG laser, and has recommended that treatment as the next step in management.     Plan:   1.  No further sclerotherapy planned as it did not provide relief of symptoms.  2.  Serial session laser therapy with Dr. Bautista.  3.  Coagulation studies are pending.    It was a pleasure to see Ms. Gallardo in clinic today.  Thank you for involving the interventional radiology service in her care.    I spent a total of 20 minutes face-to-face time with Ms. Gallardo and this in person visit, over 50% time was for counseling and care coordination.  On the day of clinic visit, I spent an additional 10 minutes reviewing imaging and previous records.    Roseline Marcial MD  Interventional Radiology   Pager [phone]        CC  Patient Care Team:  Julieta Zapien, ANUSHKA as PCP - Lucita Bergeron RN as Registered Nurse  Roseline Marcial MD as MD (Radiology)  JULIETA ZAPIEN

## 2021-04-20 NOTE — TELEPHONE ENCOUNTER
Contacted pt this am, message from Dr. Starkey was explained. Maritza verbalized understanding and denied questions or concerns.

## 2021-04-21 DIAGNOSIS — Q27.9 VASCULAR MALFORMATION: Primary | ICD-10-CM

## 2021-04-27 DIAGNOSIS — Z11.59 ENCOUNTER FOR SCREENING FOR OTHER VIRAL DISEASES: ICD-10-CM

## 2021-05-06 ENCOUNTER — ANESTHESIA EVENT (OUTPATIENT)
Dept: SURGERY | Facility: CLINIC | Age: 26
End: 2021-05-06
Payer: COMMERCIAL

## 2021-05-07 ENCOUNTER — ANESTHESIA (OUTPATIENT)
Dept: SURGERY | Facility: CLINIC | Age: 26
End: 2021-05-07
Payer: COMMERCIAL

## 2021-05-07 ENCOUNTER — TELEPHONE (OUTPATIENT)
Dept: DERMATOLOGY | Facility: CLINIC | Age: 26
End: 2021-05-07

## 2021-05-07 ENCOUNTER — HOSPITAL ENCOUNTER (OUTPATIENT)
Facility: CLINIC | Age: 26
Discharge: HOME | End: 2021-05-07
Attending: DERMATOLOGY | Admitting: DERMATOLOGY
Payer: COMMERCIAL

## 2021-05-07 VITALS
RESPIRATION RATE: 28 BRPM | TEMPERATURE: 98.4 F | SYSTOLIC BLOOD PRESSURE: 108 MMHG | HEIGHT: 65 IN | WEIGHT: 231.92 LBS | DIASTOLIC BLOOD PRESSURE: 57 MMHG | OXYGEN SATURATION: 96 % | HEART RATE: 70 BPM | BODY MASS INDEX: 38.64 KG/M2

## 2021-05-07 LAB
GLUCOSE SERPL-MCNC: 111 MG/DL (ref 70–99)
HCG UR QL: NEGATIVE

## 2021-05-07 PROCEDURE — 36415 COLL VENOUS BLD VENIPUNCTURE: CPT | Performed by: ANESTHESIOLOGY

## 2021-05-07 PROCEDURE — 999N000010 HC STATISTIC ANES STAT CODE-CRNA PER MINUTE: Performed by: DERMATOLOGY

## 2021-05-07 PROCEDURE — 81025 URINE PREGNANCY TEST: CPT | Performed by: ANESTHESIOLOGY

## 2021-05-07 PROCEDURE — 250N000025 HC SEVOFLURANE, PER MIN: Performed by: DERMATOLOGY

## 2021-05-07 PROCEDURE — 82947 ASSAY GLUCOSE BLOOD QUANT: CPT | Performed by: ANESTHESIOLOGY

## 2021-05-07 PROCEDURE — 250N000013 HC RX MED GY IP 250 OP 250 PS 637: Performed by: ANESTHESIOLOGY

## 2021-05-07 PROCEDURE — 250N000011 HC RX IP 250 OP 636: Performed by: NURSE ANESTHETIST, CERTIFIED REGISTERED

## 2021-05-07 PROCEDURE — 999N000141 HC STATISTIC PRE-PROCEDURE NURSING ASSESSMENT: Performed by: DERMATOLOGY

## 2021-05-07 PROCEDURE — 710N000010 HC RECOVERY PHASE 1, LEVEL 2, PER MIN: Performed by: DERMATOLOGY

## 2021-05-07 PROCEDURE — 258N000003 HC RX IP 258 OP 636: Performed by: NURSE ANESTHETIST, CERTIFIED REGISTERED

## 2021-05-07 PROCEDURE — 370N000017 HC ANESTHESIA TECHNICAL FEE, PER MIN: Performed by: DERMATOLOGY

## 2021-05-07 PROCEDURE — 710N000012 HC RECOVERY PHASE 2, PER MINUTE: Performed by: DERMATOLOGY

## 2021-05-07 PROCEDURE — 360N000075 HC SURGERY LEVEL 2, PER MIN: Performed by: DERMATOLOGY

## 2021-05-07 PROCEDURE — 250N000011 HC RX IP 250 OP 636: Performed by: ANESTHESIOLOGY

## 2021-05-07 RX ORDER — NALOXONE HYDROCHLORIDE 0.4 MG/ML
0.4 INJECTION, SOLUTION INTRAMUSCULAR; INTRAVENOUS; SUBCUTANEOUS
Status: DISCONTINUED | OUTPATIENT
Start: 2021-05-07 | End: 2021-05-07 | Stop reason: HOSPADM

## 2021-05-07 RX ORDER — DULOXETIN HYDROCHLORIDE 60 MG/1
60 CAPSULE, DELAYED RELEASE ORAL DAILY
COMMUNITY

## 2021-05-07 RX ORDER — PROPOFOL 10 MG/ML
INJECTION, EMULSION INTRAVENOUS PRN
Status: DISCONTINUED | OUTPATIENT
Start: 2021-05-07 | End: 2021-05-07

## 2021-05-07 RX ORDER — FENTANYL CITRATE 50 UG/ML
25-50 INJECTION, SOLUTION INTRAMUSCULAR; INTRAVENOUS
Status: DISCONTINUED | OUTPATIENT
Start: 2021-05-07 | End: 2021-05-07 | Stop reason: HOSPADM

## 2021-05-07 RX ORDER — SODIUM CHLORIDE, SODIUM LACTATE, POTASSIUM CHLORIDE, CALCIUM CHLORIDE 600; 310; 30; 20 MG/100ML; MG/100ML; MG/100ML; MG/100ML
INJECTION, SOLUTION INTRAVENOUS CONTINUOUS PRN
Status: DISCONTINUED | OUTPATIENT
Start: 2021-05-07 | End: 2021-05-07

## 2021-05-07 RX ORDER — DEXAMETHASONE SODIUM PHOSPHATE 4 MG/ML
INJECTION, SOLUTION INTRA-ARTICULAR; INTRALESIONAL; INTRAMUSCULAR; INTRAVENOUS; SOFT TISSUE PRN
Status: DISCONTINUED | OUTPATIENT
Start: 2021-05-07 | End: 2021-05-07

## 2021-05-07 RX ORDER — ONDANSETRON 4 MG/1
4 TABLET, ORALLY DISINTEGRATING ORAL EVERY 30 MIN PRN
Status: DISCONTINUED | OUTPATIENT
Start: 2021-05-07 | End: 2021-05-07 | Stop reason: HOSPADM

## 2021-05-07 RX ORDER — PROPOFOL 10 MG/ML
INJECTION, EMULSION INTRAVENOUS CONTINUOUS PRN
Status: DISCONTINUED | OUTPATIENT
Start: 2021-05-07 | End: 2021-05-07

## 2021-05-07 RX ORDER — ACETAMINOPHEN 325 MG/1
975 TABLET ORAL ONCE
Status: COMPLETED | OUTPATIENT
Start: 2021-05-07 | End: 2021-05-07

## 2021-05-07 RX ORDER — MEPERIDINE HYDROCHLORIDE 25 MG/ML
12.5 INJECTION INTRAMUSCULAR; INTRAVENOUS; SUBCUTANEOUS
Status: DISCONTINUED | OUTPATIENT
Start: 2021-05-07 | End: 2021-05-07 | Stop reason: HOSPADM

## 2021-05-07 RX ORDER — DIPHENHYDRAMINE HYDROCHLORIDE 50 MG/ML
INJECTION INTRAMUSCULAR; INTRAVENOUS PRN
Status: DISCONTINUED | OUTPATIENT
Start: 2021-05-07 | End: 2021-05-07

## 2021-05-07 RX ORDER — SODIUM CHLORIDE, SODIUM LACTATE, POTASSIUM CHLORIDE, CALCIUM CHLORIDE 600; 310; 30; 20 MG/100ML; MG/100ML; MG/100ML; MG/100ML
INJECTION, SOLUTION INTRAVENOUS CONTINUOUS
Status: DISCONTINUED | OUTPATIENT
Start: 2021-05-07 | End: 2021-05-07 | Stop reason: HOSPADM

## 2021-05-07 RX ORDER — NALOXONE HYDROCHLORIDE 0.4 MG/ML
0.2 INJECTION, SOLUTION INTRAMUSCULAR; INTRAVENOUS; SUBCUTANEOUS
Status: DISCONTINUED | OUTPATIENT
Start: 2021-05-07 | End: 2021-05-07 | Stop reason: HOSPADM

## 2021-05-07 RX ORDER — FENTANYL CITRATE 50 UG/ML
INJECTION, SOLUTION INTRAMUSCULAR; INTRAVENOUS PRN
Status: DISCONTINUED | OUTPATIENT
Start: 2021-05-07 | End: 2021-05-07

## 2021-05-07 RX ORDER — ONDANSETRON 2 MG/ML
4 INJECTION INTRAMUSCULAR; INTRAVENOUS EVERY 30 MIN PRN
Status: DISCONTINUED | OUTPATIENT
Start: 2021-05-07 | End: 2021-05-07 | Stop reason: HOSPADM

## 2021-05-07 RX ORDER — LIDOCAINE 40 MG/G
CREAM TOPICAL
Status: DISCONTINUED | OUTPATIENT
Start: 2021-05-07 | End: 2021-05-07 | Stop reason: HOSPADM

## 2021-05-07 RX ORDER — ONDANSETRON 2 MG/ML
INJECTION INTRAMUSCULAR; INTRAVENOUS PRN
Status: DISCONTINUED | OUTPATIENT
Start: 2021-05-07 | End: 2021-05-07

## 2021-05-07 RX ADMIN — FENTANYL CITRATE 25 MCG: 50 INJECTION, SOLUTION INTRAMUSCULAR; INTRAVENOUS at 08:34

## 2021-05-07 RX ADMIN — PROPOFOL 30 MG: 10 INJECTION, EMULSION INTRAVENOUS at 07:41

## 2021-05-07 RX ADMIN — MIDAZOLAM 2 MG: 1 INJECTION INTRAMUSCULAR; INTRAVENOUS at 07:30

## 2021-05-07 RX ADMIN — FENTANYL CITRATE 50 MCG: 50 INJECTION, SOLUTION INTRAMUSCULAR; INTRAVENOUS at 08:13

## 2021-05-07 RX ADMIN — PROPOFOL 100 MG: 10 INJECTION, EMULSION INTRAVENOUS at 07:45

## 2021-05-07 RX ADMIN — FENTANYL CITRATE 25 MCG: 50 INJECTION, SOLUTION INTRAMUSCULAR; INTRAVENOUS at 08:39

## 2021-05-07 RX ADMIN — PROPOFOL 50 MG: 10 INJECTION, EMULSION INTRAVENOUS at 07:39

## 2021-05-07 RX ADMIN — PROPOFOL 250 MCG/KG/MIN: 10 INJECTION, EMULSION INTRAVENOUS at 07:38

## 2021-05-07 RX ADMIN — DIPHENHYDRAMINE HYDROCHLORIDE 25 MG: 50 INJECTION, SOLUTION INTRAMUSCULAR; INTRAVENOUS at 07:41

## 2021-05-07 RX ADMIN — ONDANSETRON 4 MG: 2 INJECTION INTRAMUSCULAR; INTRAVENOUS at 07:39

## 2021-05-07 RX ADMIN — SODIUM CHLORIDE, POTASSIUM CHLORIDE, SODIUM LACTATE AND CALCIUM CHLORIDE: 600; 310; 30; 20 INJECTION, SOLUTION INTRAVENOUS at 07:39

## 2021-05-07 RX ADMIN — ACETAMINOPHEN 975 MG: 325 TABLET, FILM COATED ORAL at 09:08

## 2021-05-07 RX ADMIN — FENTANYL CITRATE 25 MCG: 50 INJECTION, SOLUTION INTRAMUSCULAR; INTRAVENOUS at 07:52

## 2021-05-07 RX ADMIN — DEXAMETHASONE SODIUM PHOSPHATE 8 MG: 4 INJECTION, SOLUTION INTRAMUSCULAR; INTRAVENOUS at 07:45

## 2021-05-07 RX ADMIN — FENTANYL CITRATE 25 MCG: 50 INJECTION, SOLUTION INTRAMUSCULAR; INTRAVENOUS at 07:39

## 2021-05-07 ASSESSMENT — MIFFLIN-ST. JEOR: SCORE: 1798.49

## 2021-05-07 NOTE — ANESTHESIA PREPROCEDURE EVALUATION
Anesthesia Pre-Procedure Evaluation    Patient: Maritza Gallardo   MRN: [id number] : 1995        Preoperative Diagnosis: Vascular malformation [Q27.9]   Procedure : Procedure(s):  PROCEDURE, DERMATOLOGIC, USING LASER, IN OPERATING ROOM Right Arm     Past Medical History:   Diagnosis Date     Amenorrhea      Chronic low back pain      Gastroesophageal reflux disease      PONV (postoperative nausea and vomiting)      Sleep apnea      Venous malformation     right arm      Past Surgical History:   Procedure Laterality Date     IR VEIN SPIDER NON FACE SCLEROTHERAPY  2020     IR VEIN SPIDER NON FACE SCLEROTHERAPY  2020     IR VEIN SPIDER NON FACE SCLEROTHERAPY  10/22/2020     IR VEIN SPIDER NON FACE SCLEROTHERAPY  12/15/2020     SCLEROTHERAPY      right arm, chest     Sedated MRI      age 6     SURGICAL RADIOLOGY PROCEDURE Right 12/15/2020    Procedure: Right Upper Extremity with sclerotherapy with Bleomycin;  Surgeon: Roseline Marcial MD;  Location:  [facility]      Allergies   Allergen Reactions     Adhesive Tape Blisters     Morphine Hives     Seasonal Allergies       Social History     Tobacco Use     Smoking status: Heavy Tobacco Smoker     Packs/day: 0.50     Types: Cigarettes     Smokeless tobacco: Never Used   Substance Use Topics     Alcohol use: Yes     Comment: rarely      Wt Readings from Last 1 Encounters:   21 104.3 kg (229 lb 15 oz)        Anesthesia Evaluation   Pt has had prior anesthetic. Type: General.    History of anesthetic complications  - PONV.      ROS/MED HX  ENT/Pulmonary:     (+) sleep apnea, uses CPAP,     Neurologic:  - neg neurologic ROS     Cardiovascular:  - neg cardiovascular ROS     METS/Exercise Tolerance: >4 METS    Hematologic:  - neg hematologic  ROS     Musculoskeletal:  - neg musculoskeletal ROS     GI/Hepatic:     (+) GERD,     Renal/Genitourinary:  - neg Renal ROS     Endo:     (+) Obesity,     Psychiatric/Substance Use:  - neg  psychiatric ROS     Infectious Disease:  - neg infectious disease ROS     Malignancy:  - neg malignancy ROS     Other:  - neg other ROS          Physical Exam    Airway  airway exam normal      Mallampati: I   TM distance: > 3 FB   Neck ROM: full   Mouth opening: > 3 cm    Respiratory Devices and Support         Dental  no notable dental history         Cardiovascular    unable to assess      Rhythm and rate: regular and normal     Pulmonary    Unable to assess       breath sounds clear to auscultation           OUTSIDE LABS:  CBC:   Lab Results   Component Value Date    WBC 13.6 (H) 04/07/2021    WBC 9.7 09/25/2020    HGB 14.7 04/07/2021    HGB 13.4 09/25/2020    HCT 43.4 04/07/2021    HCT 39.4 09/25/2020     04/07/2021     12/15/2020     BMP:   Lab Results   Component Value Date    POTASSIUM 3.8 12/15/2020    POTASSIUM 3.6 12/03/2020    CR 0.80 12/03/2020     (H) 12/15/2020     (A) 12/03/2020     COAGS:   Lab Results   Component Value Date    PTT 28 09/25/2020    INR 0.91 04/07/2021    FIBR 459 (H) 04/07/2021     POC:   Lab Results   Component Value Date     (H) 09/25/2020    HCG Negative 12/15/2020    HCGS Negative 05/09/2017     HEPATIC:   Lab Results   Component Value Date    ALT 11 12/03/2020    AST 9 (A) 12/03/2020     OTHER: No results found for: PH, LACT, A1C, HELENE, PHOS, MAG, LIPASE, AMYLASE, TSH, T4, T3, CRP, SED    Anesthesia Plan    ASA Status:  3   NPO Status:  NPO Appropriate    Anesthesia Type: MAC.     - Reason for MAC: straight local not clinically adequate   Induction: Intravenous.   Maintenance: TIVA.        Consents    Anesthesia Plan(s) and associated risks, benefits, and realistic alternatives discussed. Questions answered and patient/representative(s) expressed understanding.     - Discussed with:  Patient         Postoperative Care    Pain management: IV analgesics.   PONV prophylaxis: Ondansetron (or other 5HT-3), Background Propofol Infusion, Dexamethasone  or Solumedrol     Comments:    - PIV  - GA/natural airway, LMA/GETA as back-up  - Maintenance: TIVA with propofol  - Analgesia: fentanyl  - PONV prophylaxis: ondansetron, dexamethasone    Risks and benefits of anesthetic approach, including but not limited to need for conversion to LMA/ETT, sore throat, hoarseness, mucosal injury, dental injury, bronchospasm/laryngospasm, PONV, aspiration, injury to blood vessels and/ or nerves, hemodynamic and respiratory issues including potential long term consequences, bleeding, side effects of blood transfusion and postoperative delirium were discussed with parents and all questions were answered.    Esequiel Greer MD  Pediatric Staff Anesthesiologist  [facility]  Pager [phone]  Phone [phone]             Esequiel Greer MD

## 2021-05-07 NOTE — ANESTHESIA CARE TRANSFER NOTE
Patient: Maritza Gallardo    Procedure(s):  PROCEDURE, DERMATOLOGIC, USING LASER, IN OPERATING ROOM Right Arm    Diagnosis: Vascular malformation [Q27.9]  Diagnosis Additional Information: No value filed.    Anesthesia Type:   MAC     Note:    Oropharynx: oropharynx clear of all foreign objects and spontaneously breathing  Level of Consciousness: awake  Oxygen Supplementation: face mask  Level of Supplemental Oxygen (L/min / FiO2): 10  Independent Airway: airway patency satisfactory and stable  Dentition: dentition unchanged  Vital Signs Stable: post-procedure vital signs reviewed and stable  Report to RN Given: handoff report given  Patient transferred to: PACU  Comments: Patient transferred to PACU on simple facemask O2 at 10 LPM. VSS and respiration WNL upon arrival. Report to RN, all questions answered.     Teressa Sarmiento 8:15 AM May 7, 2021        Handoff Report: Identifed the Patient, Identified the Reponsible Provider, Reviewed the pertinent medical history, Discussed the surgical course, Reviewed Intra-OP anesthesia mangement and issues during anesthesia, Set expectations for post-procedure period and Allowed opportunity for questions and acknowledgement of understanding      Vitals: (Last set prior to Anesthesia Care Transfer)  CRNA VITALS  5/7/2021 0734 - 5/7/2021 0815      5/7/2021             NIBP:  (!) 131/95    Pulse:  94    NIBP Mean:  107    Temp:  36.8  C (98.2  F)    SpO2:  100 %    Resp Rate (observed):  20        Electronically Signed By: MELODIE Cabrera CRNA  May 7, 2021  8:15 AM

## 2021-05-07 NOTE — DISCHARGE INSTRUCTIONS
May take 500-1000mg of Acetaminophen/Tylenol every 6 hours for Pain. May take Ibuprofen after 2 days.  May shower today.  Apply Vaseline to arm today.  Rest andIce arm arm today.  Bruising may occur for 1-2 weeks.  Avoid Sun exposure for 2 weeks until bruising resolved.  Schedule a phone visit with Dr Bautista for 6-8 weeks.  Please notify Dr Bautista if blistering occurs.  Call the nurse line at [phone] with any concerns or questions.        Same-Day Surgery   Adult Discharge Orders & Instructions     For 24 hours after surgery:  1. Get plenty of rest.  A responsible adult must stay with you for at least 24 hours after you leave the hospital.   2. Pain medication can slow your reflexes. Do not drive or use heavy equipment.  If you have weakness or tingling, don't drive or use heavy equipment until this feeling goes away.  3. Mixing alcohol and pain medication can cause dizziness and slow your breathing. It can even be fatal. Do not drink alcohol while taking pain medication.  4. Avoid strenuous or risky activities.  Ask for help when climbing stairs.   5. You may feel lightheaded.  If so, sit for a few minutes before standing.  Have someone help you get up.   6. If you have nausea (feel sick to your stomach), drink only clear liquids such as apple juice, ginger ale, broth or 7-Up.  Rest may also help.  Be sure to drink enough fluids.  Move to a regular diet as you feel able. Take pain medications with a small amount of solid food, such as toast or crackers, to avoid nausea.   7. A slight fever is normal. Call the doctor if your fever is over 100 F (37.7 C) (taken under the tongue) or lasts longer than 24 hours.  8. You may have a dry mouth, muscle aches, trouble sleeping or a sore throat.  These symptoms should go away after 24 hours.  9. Do not make important or legal decisions.   Pain Management:      1. Take pain medication (if prescribed) for pain as directed by your physician.        2. WARNING: If the  pain medication you have been prescribed contains Tylenol  (acetaminophen), DO NOT take additional doses of Tylenol (acetaminophen).     Call your doctor for any of the followin.  Signs of infection (fever, growing tenderness at the surgery site, severe pain, a large amount of drainage or bleeding, foul-smelling drainage, redness, swelling).    2.  It has been over 8 to 10 hours since surgery and you are still not able to urinate (pee).    3.  Headache for over 24 hours.    4.  Numbness, tingling or weakness the day after surgery (if you had spinal anesthesia).  To contact a doctor, call _____________________________________ or:      [phone] and ask for the Resident On Call for:          __________________________________________ (answered 24 hours a day)      Emergency Department:  [facility]: [phone]  [facility]: [phone]               Rev. 10/2014

## 2021-05-07 NOTE — ANESTHESIA PROCEDURE NOTES
Airway       Patient location during procedure: OR  Staff -        Performed By: CRNA  Consent for Airway        Urgency: elective  Indications and Patient Condition       Indications for airway management: dc-procedural       Induction type:intravenous       Mask difficulty assessment: 0 - not attempted    Final Airway Details       Final airway type: supraglottic airway    Supraglottic Airway Details        Type: LMA       Brand: Air-Q       LMA size: 3.5    Post intubation assessment        Placement verified by: capnometry, equal breath sounds and chest rise        Number of attempts at approach: 1       Number of other approaches attempted: 0       Secured with: pink tape       Ease of procedure: easy       Dentition: Intact and Unchanged

## 2021-05-07 NOTE — ANESTHESIA POSTPROCEDURE EVALUATION
Patient: Maritza Gallardo    Procedure(s):  PROCEDURE, DERMATOLOGIC, USING LASER, IN OPERATING ROOM Right Arm    Diagnosis:Vascular malformation [Q27.9]  Diagnosis Additional Information: No value filed.    Anesthesia Type:  MAC    Note:  Disposition: Outpatient   Postop Pain Control: Uneventful            Sign Out: Well controlled pain   PONV: No   Neuro/Psych: Uneventful            Sign Out: Acceptable/Baseline neuro status   Airway/Respiratory: Uneventful            Sign Out: Acceptable/Baseline resp. status   CV/Hemodynamics: Uneventful            Sign Out: Acceptable CV status; No obvious hypovolemia; No obvious fluid overload   Other NRE:    DID A NON-ROUTINE EVENT OCCUR?     Event details/Postop Comments:  I personally evaluated the patient at bedside. No anesthesia-related complications noted. Patient is hemodynamically stable with adequate control of pain and nausea. Ready for discharge from PACU. All questions were answered.    Esequiel Greer MD  Pediatric Staff Anesthesiologist  [facility]  Pager [phone]  Phone [phone]            Last vitals:  Vitals:    05/07/21 0900 05/07/21 0915 05/07/21 0930   BP: 108/51 122/58 108/57   Pulse: 69 79 70   Resp: 19 25 28   Temp:   36.9  C (98.4  F)   SpO2: 94% 96% 96%       Last vitals prior to Anesthesia Care Transfer:  CRNA VITALS  5/7/2021 0734 - 5/7/2021 0834      5/7/2021             NIBP:  (!) 131/95    Pulse:  94    NIBP Mean:  107    Temp:  36.8  C (98.2  F)    SpO2:  100 %    Resp Rate (observed):  20          Electronically Signed By: Esequiel Greer MD  May 7, 2021  11:02 AM

## 2021-05-07 NOTE — PROCEDURES
"[facility]   Pediatric Dermatologic Laser Surgery   Procedure Note       Patient Name:  Maritza Gallardo  Patient :  1995  Medical Record #: [id number]  Date of Operation: May 7, 2021    Past Medical History:   Diagnosis Date     Amenorrhea      Chronic low back pain      Gastroesophageal reflux disease      PONV (postoperative nausea and vomiting)      Sleep apnea      Venous malformation     right arm     /60 (Cuff Size: Adult Large)   Pulse 84   Temp 97.7  F (36.5  C) (Oral)   Resp 18   Ht 1.644 m (5' 4.72\")   Wt 105.2 kg (231 lb 14.8 oz)   SpO2 100%   BMI 38.92 kg/m        SURGEON:  Ghislaine Bautista MD    ASSISTANT:  N/A    PREOPERATIVE DIAGNOSIS:  gloluvenous malformation    POSTOPERATIVE DIAGNOSIS:  same    INDICATION: treatment of glomuvenous malformation    PROCEDURE PERFORMED:  Nd-YAG 1064nm laser    PROCEDURE:   H & P reviewed prior to the procedure.  After discussion of risks and benefits of the procedure including the presence of pain, blister formation, scarring,  pigmentary changes or bruising following the procedure, written consent was obtained from the patient, and the patient was taken to the procedure room. General anesthesia was induced per the anesthesia team.  The patient was placed in the supine position.  Appropriate eyewear in place for all in attendance.  The lesion on the right upper extremity was delineated by a marking pen.     SITE: right upper extremity   SPOT SIZE: 12 mm  FLUENCE: 46 J/cm2  PULSE DURATION: 3 ms  PULSE NUMBER: 197 pulses  COOLIN/20  TOTAL AREA TREATED: 60x10 sq cm.  NOTES:   Vaseline and ice pack applied after procedure and while in recovery.     There were no complications to the procedure or abnormal findings.  Post-op care discussed including sun protection, use of analgesia.  Follow up in  6-8 weeks for re-treatment.    Ghislaine Bautista MD    of Dermatology & Pediatrics "   Pediatric Dermatology   05/07/2021

## 2021-05-10 ENCOUNTER — TELEPHONE (OUTPATIENT)
Dept: DERMATOLOGY | Facility: CLINIC | Age: 26
End: 2021-05-10

## 2021-05-10 NOTE — TELEPHONE ENCOUNTER
RN completed follow up call following procedure in OR for Nd-Yag laser treatment on arm. RN spoke with Maritza who reports she is doing well. She denies any questions or concerns. RN confirmed that patient has clinic contact information should something arise.

## 2021-05-18 ENCOUNTER — CARE COORDINATION (OUTPATIENT)
Dept: DERMATOLOGY | Facility: CLINIC | Age: 26
End: 2021-05-18

## 2021-05-18 DIAGNOSIS — Q27.8 GLOMUVENOUS MALFORMATION: Primary | ICD-10-CM

## 2021-05-18 NOTE — LETTER
May 18, 2021      TO: Maritza Gallardo  [address]  [facility] [address]         Dear Maritza,    You are scheduled for the following general sedation procedures for ND Yag laser treatment with Dr. Bautista on:    Friday July 2nd at 11:15 am (arrive 90 minutes prior)   &   Friday September 3rd at 11:00 am (arrive 90 minutes prior)     For each of these cases you will need to check into the surgery check in on the 3rd floor of the [address] [place] [address], 90 minutes prior to your scheduled times.     You must complete a history & physical for each of these procedures no sooner than 30 days prior to each of these cases. Have these results faxed to pre-admission nursing at [phone]    You will also need to complete a COVID19 PCR test, and resulted within 4 days of each of these cases. Please bring these results with you on this day and have these faxed to [phone].     Pre-admission nursing will be contacting you several days prior to these appointments to provide further details.     If you have any questions or concerns prior to this time, please call our clinic at [phone].       Sincerely,      Bri Schwab, RN Care Coordinator  Pediatric Dermatology Clinic  [facility]

## 2021-05-18 NOTE — PROGRESS NOTES
RN spoke to pt about not being able to complete ND Yag laser treatments in sedation and needing to be scheduled in OR. RN worked with pt and OR staff to schedule pt for ND Yag laser treatments on Friday July 2nd at 1115 and Friday Sept. 3rd at 11;00 am. Letter with details was also mailed to pts home. RN explained were to check in, need for H & P prior to both procedures as well as COVID19 PCR testing. Pt was agreeable and denied questions or concerns. Dr. Starkey, Dr. Bautista and peds derm staff updated.     Case requests for both procedures were placed per OR requirements

## 2021-05-31 DIAGNOSIS — Z11.59 ENCOUNTER FOR SCREENING FOR OTHER VIRAL DISEASES: ICD-10-CM

## 2021-06-18 DIAGNOSIS — Z11.59 ENCOUNTER FOR SCREENING FOR OTHER VIRAL DISEASES: ICD-10-CM

## 2021-06-28 ENCOUNTER — EXTERNAL ORDER RESULTS (OUTPATIENT)
Dept: TRANSPLANT | Facility: CLINIC | Age: 26
End: 2021-06-28

## 2021-07-01 ENCOUNTER — ANESTHESIA EVENT (OUTPATIENT)
Dept: SURGERY | Facility: CLINIC | Age: 26
End: 2021-07-01
Payer: COMMERCIAL

## 2021-07-01 NOTE — ANESTHESIA PREPROCEDURE EVALUATION
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]   Procedure : Procedure(s):  ND-YAG Laser  Right Upper Extrermities     Past Medical History:   Diagnosis Date     Amenorrhea      Chronic low back pain      Gastroesophageal reflux disease      PONV (postoperative nausea and vomiting)      Sleep apnea      Venous malformation     right arm      Past Surgical History:   Procedure Laterality Date     IR VEIN SPIDER NON FACE SCLEROTHERAPY  2020     IR VEIN SPIDER NON FACE SCLEROTHERAPY  2020     IR VEIN SPIDER NON FACE SCLEROTHERAPY  10/22/2020     IR VEIN SPIDER NON FACE SCLEROTHERAPY  12/15/2020     LASER DERMATOLOGY PROCEDURE IN OR Right 2021    Procedure: Nd-YAG (1064nm) laser treatment of glomuvenous malformation, Right Arm,;  Surgeon: Ghislaine Bautista MD;  Location: [facility] OR     SCLEROTHERAPY      right arm, chest     Sedated MRI      age 6     SURGICAL RADIOLOGY PROCEDURE Right 12/15/2020    Procedure: Right Upper Extremity with sclerotherapy with Bleomycin;  Surgeon: Roseline Marcial MD;  Location: [facility]      Allergies   Allergen Reactions     Adhesive Tape Blisters     Morphine Hives     Seasonal Allergies       Social History     Tobacco Use     Smoking status: Heavy Tobacco Smoker     Packs/day: 0.50     Types: Cigarettes     Smokeless tobacco: Never Used   Substance Use Topics     Alcohol use: Yes     Comment: rarely      Wt Readings from Last 1 Encounters:   21 105.2 kg (231 lb 14.8 oz)        Anesthesia Evaluation            ROS/MED HX  ENT/Pulmonary:     (+) RICKY risk factors, obese,     Neurologic:     (+) migraines,     Cardiovascular:  - neg cardiovascular ROS     METS/Exercise Tolerance:     Hematologic:  - neg hematologic  ROS     Musculoskeletal:       GI/Hepatic:     (+) GERD,     Renal/Genitourinary:  - neg Renal ROS      Endo:     (+) Obesity,     Psychiatric/Substance Use:     (+) psychiatric history anxiety     Infectious Disease:  - neg infectious disease ROS     Malignancy:  - neg malignancy ROS     Other:      (+) , H/O Chronic Pain,        Physical Exam    Airway        Mallampati: III   TM distance: > 3 FB   Neck ROM: full   Mouth opening: < 3 cm    Respiratory Devices and Support         Dental  no notable dental history         Cardiovascular          Rhythm and rate: regular and normal     Pulmonary           breath sounds clear to auscultation           OUTSIDE LABS:  CBC:   Lab Results   Component Value Date    WBC 13.6 (H) 04/07/2021    WBC 9.7 09/25/2020    HGB 14.7 04/07/2021    HGB 13.4 09/25/2020    HCT 43.4 04/07/2021    HCT 39.4 09/25/2020     04/07/2021     12/15/2020     BMP:   Lab Results   Component Value Date    POTASSIUM 3.8 12/15/2020    POTASSIUM 3.6 12/03/2020    CR 0.80 12/03/2020     (H) 05/07/2021     (H) 12/15/2020     COAGS:   Lab Results   Component Value Date    PTT 28 09/25/2020    INR 0.91 04/07/2021    FIBR 459 (H) 04/07/2021     POC:   Lab Results   Component Value Date     (H) 09/25/2020    HCG Negative 05/07/2021    HCGS Negative 05/09/2017     HEPATIC:   Lab Results   Component Value Date    ALT 11 12/03/2020    AST 9 (A) 12/03/2020     OTHER: No results found for: PH, LACT, A1C, HELENE, PHOS, MAG, LIPASE, AMYLASE, TSH, T4, T3, CRP, SED    Anesthesia Plan    ASA Status:  3   NPO Status:  NPO Appropriate    Anesthesia Type: General.     - Airway: LMA   Induction: Intravenous, Propofol.   Maintenance: Balanced.        Consents    Anesthesia Plan(s) and associated risks, benefits, and realistic alternatives discussed. Questions answered and patient/representative(s) expressed understanding.     - Discussed with:  Patient      - Extended Intubation/Ventilatory Support Discussed: No.      - Patient is DNR/DNI Status: No    Use of blood products discussed: No .      Postoperative Care    Pain management: IV analgesics, Oral pain medications.   PONV prophylaxis: Ondansetron (or other 5HT-3), Dexamethasone or Solumedrol     Comments:                Yvette Grijalva MD

## 2021-07-02 ENCOUNTER — ANESTHESIA (OUTPATIENT)
Dept: SURGERY | Facility: CLINIC | Age: 26
End: 2021-07-02
Payer: COMMERCIAL

## 2021-07-02 ENCOUNTER — HOSPITAL ENCOUNTER (OUTPATIENT)
Facility: CLINIC | Age: 26
Discharge: HOME | End: 2021-07-02
Attending: DERMATOLOGY | Admitting: DERMATOLOGY
Payer: COMMERCIAL

## 2021-07-02 VITALS
DIASTOLIC BLOOD PRESSURE: 79 MMHG | BODY MASS INDEX: 38.13 KG/M2 | TEMPERATURE: 97.5 F | HEIGHT: 65 IN | SYSTOLIC BLOOD PRESSURE: 146 MMHG | WEIGHT: 228.84 LBS | RESPIRATION RATE: 18 BRPM | HEART RATE: 72 BPM | OXYGEN SATURATION: 99 %

## 2021-07-02 LAB
CREAT SERPL-MCNC: 0.66 MG/DL (ref 0.52–1.04)
GFR SERPL CREATININE-BSD FRML MDRD: >90 ML/MIN/{1.73_M2}
GLUCOSE BLDC GLUCOMTR-MCNC: 77 MG/DL (ref 70–99)
HCG UR QL: NEGATIVE
POTASSIUM SERPL-SCNC: 3.8 MMOL/L (ref 3.4–5.3)

## 2021-07-02 PROCEDURE — 82565 ASSAY OF CREATININE: CPT | Performed by: ANESTHESIOLOGY

## 2021-07-02 PROCEDURE — 250N000009 HC RX 250: Performed by: NURSE ANESTHETIST, CERTIFIED REGISTERED

## 2021-07-02 PROCEDURE — 88305 TISSUE EXAM BY PATHOLOGIST: CPT | Mod: 26 | Performed by: DERMATOLOGY

## 2021-07-02 PROCEDURE — 258N000003 HC RX IP 258 OP 636: Performed by: NURSE ANESTHETIST, CERTIFIED REGISTERED

## 2021-07-02 PROCEDURE — 88342 IMHCHEM/IMCYTCHM 1ST ANTB: CPT | Mod: 26 | Performed by: DERMATOLOGY

## 2021-07-02 PROCEDURE — 88341 IMHCHEM/IMCYTCHM EA ADD ANTB: CPT | Mod: TC | Performed by: STUDENT IN AN ORGANIZED HEALTH CARE EDUCATION/TRAINING PROGRAM

## 2021-07-02 PROCEDURE — 88305 TISSUE EXAM BY PATHOLOGIST: CPT | Mod: TC | Performed by: STUDENT IN AN ORGANIZED HEALTH CARE EDUCATION/TRAINING PROGRAM

## 2021-07-02 PROCEDURE — 250N000025 HC SEVOFLURANE, PER MIN: Performed by: DERMATOLOGY

## 2021-07-02 PROCEDURE — 88342 IMHCHEM/IMCYTCHM 1ST ANTB: CPT | Mod: TC | Performed by: STUDENT IN AN ORGANIZED HEALTH CARE EDUCATION/TRAINING PROGRAM

## 2021-07-02 PROCEDURE — 710N000012 HC RECOVERY PHASE 2, PER MINUTE: Performed by: DERMATOLOGY

## 2021-07-02 PROCEDURE — 250N000011 HC RX IP 250 OP 636: Performed by: NURSE ANESTHETIST, CERTIFIED REGISTERED

## 2021-07-02 PROCEDURE — 81025 URINE PREGNANCY TEST: CPT | Performed by: ANESTHESIOLOGY

## 2021-07-02 PROCEDURE — 710N000009 HC RECOVERY PHASE 1, LEVEL 1, PER MIN: Performed by: DERMATOLOGY

## 2021-07-02 PROCEDURE — 250N000009 HC RX 250: Performed by: DERMATOLOGY

## 2021-07-02 PROCEDURE — 999N000141 HC STATISTIC PRE-PROCEDURE NURSING ASSESSMENT: Performed by: DERMATOLOGY

## 2021-07-02 PROCEDURE — 360N000075 HC SURGERY LEVEL 2, PER MIN: Performed by: DERMATOLOGY

## 2021-07-02 PROCEDURE — 250N000013 HC RX MED GY IP 250 OP 250 PS 637: Performed by: ANESTHESIOLOGY

## 2021-07-02 PROCEDURE — 36415 COLL VENOUS BLD VENIPUNCTURE: CPT | Performed by: ANESTHESIOLOGY

## 2021-07-02 PROCEDURE — 82962 GLUCOSE BLOOD TEST: CPT

## 2021-07-02 PROCEDURE — 370N000017 HC ANESTHESIA TECHNICAL FEE, PER MIN: Performed by: DERMATOLOGY

## 2021-07-02 PROCEDURE — 84132 ASSAY OF SERUM POTASSIUM: CPT | Performed by: ANESTHESIOLOGY

## 2021-07-02 RX ORDER — NALOXONE HYDROCHLORIDE 0.4 MG/ML
0.2 INJECTION, SOLUTION INTRAMUSCULAR; INTRAVENOUS; SUBCUTANEOUS
Status: DISCONTINUED | OUTPATIENT
Start: 2021-07-02 | End: 2021-07-02 | Stop reason: HOSPADM

## 2021-07-02 RX ORDER — MEPERIDINE HYDROCHLORIDE 25 MG/ML
12.5 INJECTION INTRAMUSCULAR; INTRAVENOUS; SUBCUTANEOUS
Status: DISCONTINUED | OUTPATIENT
Start: 2021-07-02 | End: 2021-07-02 | Stop reason: HOSPADM

## 2021-07-02 RX ORDER — LIDOCAINE HYDROCHLORIDE 20 MG/ML
INJECTION, SOLUTION INFILTRATION; PERINEURAL PRN
Status: DISCONTINUED | OUTPATIENT
Start: 2021-07-02 | End: 2021-07-02

## 2021-07-02 RX ORDER — PROPOFOL 10 MG/ML
INJECTION, EMULSION INTRAVENOUS PRN
Status: DISCONTINUED | OUTPATIENT
Start: 2021-07-02 | End: 2021-07-02

## 2021-07-02 RX ORDER — ACETAMINOPHEN 325 MG/1
975 TABLET ORAL ONCE
Status: COMPLETED | OUTPATIENT
Start: 2021-07-02 | End: 2021-07-02

## 2021-07-02 RX ORDER — SODIUM CHLORIDE, SODIUM LACTATE, POTASSIUM CHLORIDE, CALCIUM CHLORIDE 600; 310; 30; 20 MG/100ML; MG/100ML; MG/100ML; MG/100ML
INJECTION, SOLUTION INTRAVENOUS CONTINUOUS
Status: DISCONTINUED | OUTPATIENT
Start: 2021-07-02 | End: 2021-07-02 | Stop reason: HOSPADM

## 2021-07-02 RX ORDER — FENTANYL CITRATE 50 UG/ML
INJECTION, SOLUTION INTRAMUSCULAR; INTRAVENOUS PRN
Status: DISCONTINUED | OUTPATIENT
Start: 2021-07-02 | End: 2021-07-02

## 2021-07-02 RX ORDER — ONDANSETRON 2 MG/ML
4 INJECTION INTRAMUSCULAR; INTRAVENOUS EVERY 30 MIN PRN
Status: DISCONTINUED | OUTPATIENT
Start: 2021-07-02 | End: 2021-07-02 | Stop reason: HOSPADM

## 2021-07-02 RX ORDER — FENTANYL CITRATE 50 UG/ML
25-50 INJECTION, SOLUTION INTRAMUSCULAR; INTRAVENOUS
Status: DISCONTINUED | OUTPATIENT
Start: 2021-07-02 | End: 2021-07-02 | Stop reason: HOSPADM

## 2021-07-02 RX ORDER — HYDROMORPHONE HYDROCHLORIDE 1 MG/ML
.3-.5 INJECTION, SOLUTION INTRAMUSCULAR; INTRAVENOUS; SUBCUTANEOUS EVERY 10 MIN PRN
Status: DISCONTINUED | OUTPATIENT
Start: 2021-07-02 | End: 2021-07-02 | Stop reason: HOSPADM

## 2021-07-02 RX ORDER — LIDOCAINE HYDROCHLORIDE AND EPINEPHRINE 10; 10 MG/ML; UG/ML
INJECTION, SOLUTION INFILTRATION; PERINEURAL PRN
Status: DISCONTINUED | OUTPATIENT
Start: 2021-07-02 | End: 2021-07-02 | Stop reason: HOSPADM

## 2021-07-02 RX ORDER — LIDOCAINE 40 MG/G
CREAM TOPICAL
Status: DISCONTINUED | OUTPATIENT
Start: 2021-07-02 | End: 2021-07-02 | Stop reason: HOSPADM

## 2021-07-02 RX ORDER — DEXAMETHASONE SODIUM PHOSPHATE 4 MG/ML
INJECTION, SOLUTION INTRA-ARTICULAR; INTRALESIONAL; INTRAMUSCULAR; INTRAVENOUS; SOFT TISSUE PRN
Status: DISCONTINUED | OUTPATIENT
Start: 2021-07-02 | End: 2021-07-02

## 2021-07-02 RX ORDER — OXYCODONE HYDROCHLORIDE 5 MG/1
5 TABLET ORAL EVERY 4 HOURS PRN
Status: DISCONTINUED | OUTPATIENT
Start: 2021-07-02 | End: 2021-07-02 | Stop reason: HOSPADM

## 2021-07-02 RX ORDER — PROPOFOL 10 MG/ML
INJECTION, EMULSION INTRAVENOUS CONTINUOUS PRN
Status: DISCONTINUED | OUTPATIENT
Start: 2021-07-02 | End: 2021-07-02

## 2021-07-02 RX ORDER — NALOXONE HYDROCHLORIDE 0.4 MG/ML
0.4 INJECTION, SOLUTION INTRAMUSCULAR; INTRAVENOUS; SUBCUTANEOUS
Status: DISCONTINUED | OUTPATIENT
Start: 2021-07-02 | End: 2021-07-02 | Stop reason: HOSPADM

## 2021-07-02 RX ORDER — SODIUM CHLORIDE, SODIUM LACTATE, POTASSIUM CHLORIDE, CALCIUM CHLORIDE 600; 310; 30; 20 MG/100ML; MG/100ML; MG/100ML; MG/100ML
INJECTION, SOLUTION INTRAVENOUS CONTINUOUS PRN
Status: DISCONTINUED | OUTPATIENT
Start: 2021-07-02 | End: 2021-07-02

## 2021-07-02 RX ORDER — ONDANSETRON 2 MG/ML
INJECTION INTRAMUSCULAR; INTRAVENOUS PRN
Status: DISCONTINUED | OUTPATIENT
Start: 2021-07-02 | End: 2021-07-02

## 2021-07-02 RX ORDER — ONDANSETRON 4 MG/1
4 TABLET, ORALLY DISINTEGRATING ORAL EVERY 30 MIN PRN
Status: DISCONTINUED | OUTPATIENT
Start: 2021-07-02 | End: 2021-07-02 | Stop reason: HOSPADM

## 2021-07-02 RX ADMIN — MIDAZOLAM 2 MG: 1 INJECTION INTRAMUSCULAR; INTRAVENOUS at 12:40

## 2021-07-02 RX ADMIN — FENTANYL CITRATE 50 MCG: 50 INJECTION, SOLUTION INTRAMUSCULAR; INTRAVENOUS at 13:28

## 2021-07-02 RX ADMIN — PROPOFOL 300 MG: 10 INJECTION, EMULSION INTRAVENOUS at 12:46

## 2021-07-02 RX ADMIN — SODIUM CHLORIDE, POTASSIUM CHLORIDE, SODIUM LACTATE AND CALCIUM CHLORIDE: 600; 310; 30; 20 INJECTION, SOLUTION INTRAVENOUS at 12:46

## 2021-07-02 RX ADMIN — ACETAMINOPHEN 975 MG: 325 TABLET, FILM COATED ORAL at 14:00

## 2021-07-02 RX ADMIN — OXYCODONE HYDROCHLORIDE 5 MG: 5 TABLET ORAL at 14:00

## 2021-07-02 RX ADMIN — DEXAMETHASONE SODIUM PHOSPHATE 8 MG: 4 INJECTION, SOLUTION INTRAMUSCULAR; INTRAVENOUS at 12:53

## 2021-07-02 RX ADMIN — HYDROMORPHONE HYDROCHLORIDE 0.5 MG: 1 INJECTION, SOLUTION INTRAMUSCULAR; INTRAVENOUS; SUBCUTANEOUS at 12:57

## 2021-07-02 RX ADMIN — LIDOCAINE HYDROCHLORIDE 100 MG: 20 INJECTION, SOLUTION INFILTRATION; PERINEURAL at 12:46

## 2021-07-02 RX ADMIN — FENTANYL CITRATE 50 MCG: 50 INJECTION, SOLUTION INTRAMUSCULAR; INTRAVENOUS at 13:11

## 2021-07-02 RX ADMIN — FENTANYL CITRATE 50 MCG: 50 INJECTION, SOLUTION INTRAMUSCULAR; INTRAVENOUS at 12:55

## 2021-07-02 RX ADMIN — FENTANYL CITRATE 50 MCG: 50 INJECTION, SOLUTION INTRAMUSCULAR; INTRAVENOUS at 12:45

## 2021-07-02 RX ADMIN — PROPOFOL 150 MCG/KG/MIN: 10 INJECTION, EMULSION INTRAVENOUS at 12:46

## 2021-07-02 RX ADMIN — ONDANSETRON 4 MG: 2 INJECTION INTRAMUSCULAR; INTRAVENOUS at 13:16

## 2021-07-02 ASSESSMENT — MIFFLIN-ST. JEOR: SCORE: 1788.88

## 2021-07-02 NOTE — ANESTHESIA CARE TRANSFER NOTE
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    Anesthesia Type:   General     Note:    Oropharynx: oropharynx clear of all foreign objects  Level of Consciousness: awake  Oxygen Supplementation: face mask  Level of Supplemental Oxygen (L/min / FiO2): 8  Independent Airway: airway patency satisfactory and stable  Dentition: dentition unchanged  Vital Signs Stable: post-procedure vital signs reviewed and stable  Report to RN Given: handoff report given  Patient transferred to: PACU    Handoff Report: Identifed the Patient, Identified the Reponsible Provider, Reviewed the pertinent medical history, Discussed the surgical course, Reviewed Intra-OP anesthesia mangement and issues during anesthesia, Set expectations for post-procedure period and Allowed opportunity for questions and acknowledgement of understanding      Vitals: (Last set prior to Anesthesia Care Transfer)  CRNA VITALS  7/2/2021 1254 - 7/2/2021 1333      7/2/2021             Pulse:  84    SpO2:  100 %        Electronically Signed By: MELODIE Selby CRNA  July 2, 2021  1:33 PM

## 2021-07-02 NOTE — ANESTHESIA POSTPROCEDURE EVALUATION
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    Anesthesia Type:  General    Note:  Disposition: Outpatient   Postop Pain Control: Uneventful            Sign Out: Well controlled pain   PONV: No   Neuro/Psych: Uneventful            Sign Out: Acceptable/Baseline neuro status   Airway/Respiratory: Uneventful            Sign Out: Acceptable/Baseline resp. status   CV/Hemodynamics: Uneventful            Sign Out: Acceptable CV status; No obvious hypovolemia; No obvious fluid overload   Other NRE: NONE   DID A NON-ROUTINE EVENT OCCUR? No           Last vitals:  Vitals:    07/02/21 1000 07/02/21 1328   BP: 113/66    Resp: 9    Temp: 36.6  C (97.9  F) 36.3  C (97.4  F)   SpO2: 98%        Last vitals prior to Anesthesia Care Transfer:  CRNA VITALS  7/2/2021 1254 - 7/2/2021 1354      7/2/2021             Pulse:  84    SpO2:  100 %          Electronically Signed By: Yvette Grijalva MD  July 2, 2021  1:58 PM

## 2021-07-02 NOTE — DISCHARGE INSTRUCTIONS
Same-Day Surgery   Adult Discharge Orders & Instructions     For 24 hours after surgery:  1. Get plenty of rest.  A responsible adult must stay with you for at least 24 hours after you leave the hospital.   2. Pain medication can slow your reflexes. Do not drive or use heavy equipment.  If you have weakness or tingling, don't drive or use heavy equipment until this feeling goes away.  3. Mixing alcohol and pain medication can cause dizziness and slow your breathing. It can even be fatal. Do not drink alcohol while taking pain medication.  4. Avoid strenuous or risky activities.  Ask for help when climbing stairs.   5. You may feel lightheaded.  If so, sit for a few minutes before standing.  Have someone help you get up.   6. If you have nausea (feel sick to your stomach), drink only clear liquids such as apple juice, ginger ale, broth or 7-Up.  Rest may also help.  Be sure to drink enough fluids.  Move to a regular diet as you feel able. Take pain medications with a small amount of solid food, such as toast or crackers, to avoid nausea.   7. A slight fever is normal. Call the doctor if your fever is over 100 F (37.7 C) (taken under the tongue) or lasts longer than 24 hours.  8. You may have a dry mouth, muscle aches, trouble sleeping or a sore throat.  These symptoms should go away after 24 hours.  9. Do not make important or legal decisions.   Pain Management:      1. Take pain medication (if prescribed) for pain as directed by your physician.        2. WARNING: If the pain medication you have been prescribed contains Tylenol  (acetaminophen), DO NOT take additional doses of Tylenol (acetaminophen).     Call your doctor for any of the followin.  Signs of infection (fever, growing tenderness at the surgery site, severe pain, a large amount of drainage or bleeding, foul-smelling drainage, redness, swelling).    2.  It has been over 8 to 10 hours since surgery and you are still not able to urinate (pee).    3.   Headache for over 24 hours.    4.  Numbness, tingling or weakness the day after surgery (if you had spinal anesthesia).  To contact a doctor, call Dr. Bautista [phone]  or:      [phone] and ask for the Resident On Call for: Dermatology (answered 24 hours a day)      Emergency Department:  [facility]: [phone]  [facility]: [phone]               Rev. 10/2014

## 2021-07-02 NOTE — INTERVAL H&P NOTE
I have reviewed the surgical (or preoperative) H&P that is linked to this encounter, and examined the patient. There are no significant changes  
21-May-2019 06:53

## 2021-07-02 NOTE — OP NOTE
"[facility]   Pediatric Dermatologic Laser Surgery   Procedure Note         Patient Name:              Maritza Gallardo  Patient :                1995  Medical Record #:       [id number]  Date of Operation:      2021     Past Medical History        Past Medical History:   Diagnosis Date     Amenorrhea       Chronic low back pain       Gastroesophageal reflux disease       PONV (postoperative nausea and vomiting)       Sleep apnea       Venous malformation       right arm         /66   Temp 97.9  F (36.6  C) (Oral)   Resp 9   Ht 5' 5\" (165.1 cm)   Wt 103.8 kg (228 lb 13.4 oz)   SpO2 98%   BMI 38.08 kg/m            SURGEON:  Ghislaine Bautista MD     ASSISTANT:  Myrna Fritz MD     PREOPERATIVE DIAGNOSIS:  gloluvenous malformation     POSTOPERATIVE DIAGNOSIS:  same     INDICATION: treatment of glomuvenous malformation     PROCEDURE PERFORMED:  Nd-YAG 1064nm laser and 4 mm punch biopsy from the R arm     PROCEDURE:   H & P reviewed prior to the procedure.  After discussion of risks and benefits of the procedure including the presence of pain, blister formation, scarring,  pigmentary changes or bruising following the procedure, written consent was obtained from the patient, and the patient was taken to the procedure room. General anesthesia was induced per the anesthesia team.  The patient was placed in the supine position.  Appropriate eyewear in place for all in attendance.  The lesion on the right upper extremity was delineated by a marking pen.          SITE: right upper extremity   SPOT SIZE: 12 mm  FLUENCE: 46 J/cm2  PULSE DURATION: 3 ms  PULSE NUMBER: 195 pulses  COOLIN/20  TOTAL AREA TREATED: 60x10 sq cm.  NOTES:   Vaseline and ice pack applied after procedure and while in recovery.       Procedure Note:  Punch biopsy:  After discussion of benefits and risks including but not limited to bleeding/bruising, pain/swelling, infection, scar, " incomplete removal, nerve damage/numbness, recurrence, and non-diagnostic biopsy, written consent was obtained. The area was cleaned with isopropyl alcohol. 1 mL of 1% lidocaine with epinephrine was injected to obtain adequate anesthesia of the lesion on the R arm. A 4 mm punch biopsy was performed.  4-0 prolene sutures were utilized to approximate the epidermal edges.  White petroleum jelly/VaselineTM and a bandage was applied to the wound.  Explicit verbal and written wound care instructions were provided.  The patient left the Dermatology Clinic in good condition. The patient was counseled to follow up for suture removal in approximately 10 days.         There were no complications to the procedure or abnormal findings.  Post-op care discussed including sun protection, use of analgesia.  Follow up in  6-8 weeks for re-treatment.    Myrna Fritz MD  Pediatric Dermatology Fellow    This patient was staffed with Dr. Bautista

## 2021-07-02 NOTE — ANESTHESIA PROCEDURE NOTES
Airway       Patient location during procedure: OR  Staff -        CRNA: Bud Smith APRN CRNA       Performed By: CRNA  Consent for Airway        Urgency: elective  Indications and Patient Condition       Indications for airway management: dc-procedural       Induction type:intravenous       Mask difficulty assessment: 2 - vent by mask + OA or adjuvant +/- NMBA    Final Airway Details       Final airway type: supraglottic airway    Supraglottic Airway Details        Type: LMA       Brand: Air-Q       LMA size: 3.5       Airway Seal Pressure (cm H2O): 20    Post intubation assessment        Placement verified by: capnometry, equal breath sounds and chest rise        Number of attempts at approach: 1       Number of other approaches attempted: 0       Secured with: silk tape       Ease of procedure: easy       Dentition: Intact and Unchanged

## 2021-07-07 ENCOUNTER — TELEPHONE (OUTPATIENT)
Dept: DERMATOLOGY | Facility: CLINIC | Age: 26
End: 2021-07-07

## 2021-07-07 NOTE — TELEPHONE ENCOUNTER
"Pt called clinic explaining she had a biopsy completed 7/2. Pt stated, \"the sutures are bugging me. To me it looks like it healed enough together to remove them.' Pt explained she \"went to my local hospital today because yesterday clear stuff was coming out. They didn't think it was infected and will do nothing here. They are really bugging me and I wonder if they can come out today?\" RN requested a photo (group email provided) pt was agreeable. RN explained once photos received, RN would discuss with Dr. Bautista to advise. Pt was agreeable and denied questions or concerns.                    "

## 2021-07-07 NOTE — TELEPHONE ENCOUNTER
"Spoke to Dr. Bautista about pts phone call, photos shown to Dr. Bautista. Per Dr. Bautista, \"she can have the sutures removed.\" Provider did not feel site was infected . RN verbalized understanding. RN contacted pt, message from Dr. Bautista was explained. RN advised for pt to keep site clean (clean everyday), moist with vaseline and continue to monitor for healing and or any signs of infection. Pt was agreeable, verbalized understanding and denied questions or concerns   "

## 2021-07-09 LAB — COPATH REPORT: NORMAL

## 2021-07-14 ENCOUNTER — TELEPHONE (OUTPATIENT)
Dept: DERMATOLOGY | Facility: CLINIC | Age: 26
End: 2021-07-14

## 2021-07-14 NOTE — TELEPHONE ENCOUNTER
"Pt called triage line inquiring if her confirmed diagnosis changes Dr. Starkey's mind about writing a letter for pt to receive disability benefits? Pt explained she has had a hard time finding work and has been employers in her area \"will not hire me\" because of her arm. Pt also reports she has pain with lifting. RN did reference April 2021s documentation from Dr. Starkey about being happy to complete FMLA paperwork or work restrictions and that it is illegal for employers to not hire her because of a disability. RN did recommend pt contact the [place] Department [employer] Rights to report the employers whom have told her this. RN did explain she would follow up again with Dr. 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 RN explained she would contact pt back once advisement from Dr Starkey was received. Routed to Dr. Starkey.   "

## 2021-07-14 NOTE — TELEPHONE ENCOUNTER
I would again be happy to provide FMLA paperwork, but not disability letter. Patient could also discuss this with her PCP.

## 2021-07-14 NOTE — TELEPHONE ENCOUNTER
Contacted Maritza, message from Dr. Starkey was explained. Maritza verbalized understanding and denied questions or concerns.

## 2021-09-02 ENCOUNTER — ANESTHESIA EVENT (OUTPATIENT)
Dept: SURGERY | Facility: CLINIC | Age: 26
End: 2021-09-02
Payer: COMMERCIAL

## 2021-09-03 ENCOUNTER — HOSPITAL ENCOUNTER (OUTPATIENT)
Facility: CLINIC | Age: 26
Discharge: HOME | End: 2021-09-03
Attending: DERMATOLOGY | Admitting: DERMATOLOGY
Payer: COMMERCIAL

## 2021-09-03 ENCOUNTER — ANESTHESIA (OUTPATIENT)
Dept: SURGERY | Facility: CLINIC | Age: 26
End: 2021-09-03
Payer: COMMERCIAL

## 2021-09-03 VITALS
WEIGHT: 228.18 LBS | HEIGHT: 65 IN | DIASTOLIC BLOOD PRESSURE: 67 MMHG | TEMPERATURE: 97.5 F | RESPIRATION RATE: 13 BRPM | SYSTOLIC BLOOD PRESSURE: 113 MMHG | BODY MASS INDEX: 38.02 KG/M2 | HEART RATE: 79 BPM | OXYGEN SATURATION: 100 %

## 2021-09-03 LAB — GLUCOSE BLDC GLUCOMTR-MCNC: 136 MG/DL (ref 70–99)

## 2021-09-03 PROCEDURE — 250N000025 HC SEVOFLURANE, PER MIN: Performed by: DERMATOLOGY

## 2021-09-03 PROCEDURE — 17107 DSTR CUT VSC PRLF LES10-50SQ: CPT | Mod: 58 | Performed by: DERMATOLOGY

## 2021-09-03 PROCEDURE — 250N000013 HC RX MED GY IP 250 OP 250 PS 637: Performed by: ANESTHESIOLOGY

## 2021-09-03 PROCEDURE — 710N000012 HC RECOVERY PHASE 2, PER MINUTE: Performed by: DERMATOLOGY

## 2021-09-03 PROCEDURE — 250N000011 HC RX IP 250 OP 636

## 2021-09-03 PROCEDURE — 710N000010 HC RECOVERY PHASE 1, LEVEL 2, PER MIN: Performed by: DERMATOLOGY

## 2021-09-03 PROCEDURE — 250N000009 HC RX 250

## 2021-09-03 PROCEDURE — 250N000011 HC RX IP 250 OP 636: Performed by: ANESTHESIOLOGY

## 2021-09-03 PROCEDURE — 258N000003 HC RX IP 258 OP 636: Performed by: ANESTHESIOLOGY

## 2021-09-03 PROCEDURE — 11104 PUNCH BX SKIN SINGLE LESION: CPT | Mod: XS | Performed by: DERMATOLOGY

## 2021-09-03 PROCEDURE — 370N000017 HC ANESTHESIA TECHNICAL FEE, PER MIN: Performed by: DERMATOLOGY

## 2021-09-03 PROCEDURE — 258N000003 HC RX IP 258 OP 636

## 2021-09-03 PROCEDURE — 360N000075 HC SURGERY LEVEL 2, PER MIN: Performed by: DERMATOLOGY

## 2021-09-03 PROCEDURE — 999N000141 HC STATISTIC PRE-PROCEDURE NURSING ASSESSMENT: Performed by: DERMATOLOGY

## 2021-09-03 RX ORDER — FENTANYL CITRATE 50 UG/ML
INJECTION, SOLUTION INTRAMUSCULAR; INTRAVENOUS PRN
Status: DISCONTINUED | OUTPATIENT
Start: 2021-09-03 | End: 2021-09-03

## 2021-09-03 RX ORDER — SODIUM CHLORIDE, SODIUM LACTATE, POTASSIUM CHLORIDE, CALCIUM CHLORIDE 600; 310; 30; 20 MG/100ML; MG/100ML; MG/100ML; MG/100ML
INJECTION, SOLUTION INTRAVENOUS CONTINUOUS
Status: DISCONTINUED | OUTPATIENT
Start: 2021-09-03 | End: 2021-09-03 | Stop reason: HOSPADM

## 2021-09-03 RX ORDER — PROPOFOL 10 MG/ML
INJECTION, EMULSION INTRAVENOUS CONTINUOUS PRN
Status: DISCONTINUED | OUTPATIENT
Start: 2021-09-03 | End: 2021-09-03

## 2021-09-03 RX ORDER — DEXAMETHASONE SODIUM PHOSPHATE 4 MG/ML
INJECTION, SOLUTION INTRA-ARTICULAR; INTRALESIONAL; INTRAMUSCULAR; INTRAVENOUS; SOFT TISSUE PRN
Status: DISCONTINUED | OUTPATIENT
Start: 2021-09-03 | End: 2021-09-03

## 2021-09-03 RX ORDER — PROPOFOL 10 MG/ML
INJECTION, EMULSION INTRAVENOUS PRN
Status: DISCONTINUED | OUTPATIENT
Start: 2021-09-03 | End: 2021-09-03

## 2021-09-03 RX ORDER — CITRIC ACID/SODIUM CITRATE 334-500MG
30 SOLUTION, ORAL ORAL ONCE
Status: COMPLETED | OUTPATIENT
Start: 2021-09-03 | End: 2021-09-03

## 2021-09-03 RX ORDER — ONDANSETRON 2 MG/ML
4 INJECTION INTRAMUSCULAR; INTRAVENOUS EVERY 30 MIN PRN
Status: DISCONTINUED | OUTPATIENT
Start: 2021-09-03 | End: 2021-09-03 | Stop reason: HOSPADM

## 2021-09-03 RX ORDER — PYRIDOXINE HCL (VITAMIN B6) 100 MG
TABLET ORAL
COMMUNITY

## 2021-09-03 RX ORDER — ONDANSETRON 2 MG/ML
INJECTION INTRAMUSCULAR; INTRAVENOUS PRN
Status: DISCONTINUED | OUTPATIENT
Start: 2021-09-03 | End: 2021-09-03

## 2021-09-03 RX ORDER — ONDANSETRON 4 MG/1
4 TABLET, ORALLY DISINTEGRATING ORAL EVERY 30 MIN PRN
Status: DISCONTINUED | OUTPATIENT
Start: 2021-09-03 | End: 2021-09-03 | Stop reason: HOSPADM

## 2021-09-03 RX ORDER — SODIUM CHLORIDE, SODIUM LACTATE, POTASSIUM CHLORIDE, CALCIUM CHLORIDE 600; 310; 30; 20 MG/100ML; MG/100ML; MG/100ML; MG/100ML
INJECTION, SOLUTION INTRAVENOUS CONTINUOUS PRN
Status: DISCONTINUED | OUTPATIENT
Start: 2021-09-03 | End: 2021-09-03

## 2021-09-03 RX ORDER — ACETAMINOPHEN 325 MG/1
975 TABLET ORAL ONCE
Status: COMPLETED | OUTPATIENT
Start: 2021-09-03 | End: 2021-09-03

## 2021-09-03 RX ORDER — HYDROMORPHONE HCL IN WATER/PF 6 MG/30 ML
0.2 PATIENT CONTROLLED ANALGESIA SYRINGE INTRAVENOUS EVERY 10 MIN PRN
Status: DISCONTINUED | OUTPATIENT
Start: 2021-09-03 | End: 2021-09-03 | Stop reason: HOSPADM

## 2021-09-03 RX ADMIN — FENTANYL CITRATE 50 MCG: 50 INJECTION, SOLUTION INTRAMUSCULAR; INTRAVENOUS at 09:37

## 2021-09-03 RX ADMIN — FENTANYL CITRATE 50 MCG: 50 INJECTION, SOLUTION INTRAMUSCULAR; INTRAVENOUS at 09:40

## 2021-09-03 RX ADMIN — SODIUM CHLORIDE, POTASSIUM CHLORIDE, SODIUM LACTATE AND CALCIUM CHLORIDE: 600; 310; 30; 20 INJECTION, SOLUTION INTRAVENOUS at 09:00

## 2021-09-03 RX ADMIN — DEXAMETHASONE SODIUM PHOSPHATE 8 MG: 4 INJECTION, SOLUTION INTRAMUSCULAR; INTRAVENOUS at 09:12

## 2021-09-03 RX ADMIN — SODIUM CHLORIDE, POTASSIUM CHLORIDE, SODIUM LACTATE AND CALCIUM CHLORIDE: 600; 310; 30; 20 INJECTION, SOLUTION INTRAVENOUS at 09:35

## 2021-09-03 RX ADMIN — FENTANYL CITRATE 75 MCG: 50 INJECTION, SOLUTION INTRAMUSCULAR; INTRAVENOUS at 09:08

## 2021-09-03 RX ADMIN — PROPOFOL 100 MG: 10 INJECTION, EMULSION INTRAVENOUS at 09:08

## 2021-09-03 RX ADMIN — PROPOFOL 300 MCG/KG/MIN: 10 INJECTION, EMULSION INTRAVENOUS at 09:09

## 2021-09-03 RX ADMIN — PROPOFOL 100 MG: 10 INJECTION, EMULSION INTRAVENOUS at 09:09

## 2021-09-03 RX ADMIN — FENTANYL CITRATE 25 MCG: 50 INJECTION, SOLUTION INTRAMUSCULAR; INTRAVENOUS at 09:47

## 2021-09-03 RX ADMIN — MIDAZOLAM 2 MG: 1 INJECTION INTRAMUSCULAR; INTRAVENOUS at 08:55

## 2021-09-03 RX ADMIN — ACETAMINOPHEN 975 MG: 325 TABLET, FILM COATED ORAL at 09:44

## 2021-09-03 RX ADMIN — HYDROMORPHONE HYDROCHLORIDE 0.2 MG: 1 INJECTION, SOLUTION INTRAMUSCULAR; INTRAVENOUS; SUBCUTANEOUS at 09:55

## 2021-09-03 RX ADMIN — ONDANSETRON 4 MG: 2 INJECTION INTRAMUSCULAR; INTRAVENOUS at 09:18

## 2021-09-03 RX ADMIN — SODIUM CITRATE AND CITRIC ACID MONOHYDRATE 30 ML: 500; 334 SOLUTION ORAL at 07:56

## 2021-09-03 ASSESSMENT — ENCOUNTER SYMPTOMS: APNEA: 1

## 2021-09-03 ASSESSMENT — MIFFLIN-ST. JEOR: SCORE: 1780.88

## 2021-09-03 NOTE — ANESTHESIA CARE TRANSFER NOTE
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    Anesthesia Type:   General     Note:    Oropharynx: oropharynx clear of all foreign objects and spontaneously breathing  Level of Consciousness: awake  Oxygen Supplementation: face mask  Level of Supplemental Oxygen (L/min / FiO2): 4      Vital Signs Stable: post-procedure vital signs reviewed and stable  Report to RN Given: handoff report given  Patient transferred to: PACU    Handoff Report: Identifed the Patient, Identified the Reponsible Provider, Reviewed the pertinent medical history, Discussed the surgical course, Reviewed Intra-OP anesthesia mangement and issues during anesthesia, Set expectations for post-procedure period and Allowed opportunity for questions and acknowledgement of understanding      Vitals:  Vitals Value Taken Time   /115 09/03/21 0936   Temp     Pulse 95 09/03/21 0944   Resp 10 09/03/21 0944   SpO2 92 % 09/03/21 0944   Vitals shown include unvalidated device data.    Electronically Signed By: MLEODIE Tian CRNA  September 3, 2021  9:45 AM

## 2021-09-03 NOTE — DISCHARGE INSTRUCTIONS
After Care Instructions Following Pulsed Dye or NDYAG Laser Treatment:  What to expect?    You should expect bruising to the treated areas following laser treatment for the next 2-4 weeks. If any bleeding or blistering occurs in the treated area, please notify the clinic.    Each patient is different but some patients receive treatments every 4-6 weeks and others are less frequent. We cannot proceed with further laser treatments until the bruising has resolved.   Care for treated areas    Keep the treated area(s) moist with Vaseline or Aquaphor for several days following treatment, UNTIL BRUISING has cleared.     We strongly recommend sun avoidance after treatment. Use sunscreen (SPF 30 or greater) and wear a wide brimmed hat for any unavoidable sun exposure to treated areas on the face.     You may bathe normally and you may swim in a pool.    You may resume all normal activities following treatment.  Pain Control    If your child has any discomfort, please give Tylenol (Acetaminophen).     Please avoid Ibuprofen when possible, as it can increase bruising.     Cold compresses may be used for swelling.     Please contact our office with questions or concerns at non urgent triage voicemail line at [phone] or call [phone] and ask for the Dermatology resident on call to be paged if it is after business hours, on a weekend or holiday or you feel the matter is urgent    Same-Day Surgery   Adult Discharge Orders & Instructions     For 24 hours after surgery:  1. Get plenty of rest.  A responsible adult must stay with you for at least 24 hours after you leave the hospital.   2. Pain medication can slow your reflexes. Do not drive or use heavy equipment.  If you have weakness or tingling, don't drive or use heavy equipment until this feeling goes away.  3. Mixing alcohol and pain medication can cause dizziness and slow your breathing. It can even be fatal. Do not drink alcohol while taking pain  medication.  4. Avoid strenuous or risky activities.  Ask for help when climbing stairs.   5. You may feel lightheaded.  If so, sit for a few minutes before standing.  Have someone help you get up.   6. If you have nausea (feel sick to your stomach), drink only clear liquids such as apple juice, ginger ale, broth or 7-Up.  Rest may also help.  Be sure to drink enough fluids.  Move to a regular diet as you feel able. Take pain medications with a small amount of solid food, such as toast or crackers, to avoid nausea.   7. A slight fever is normal. Call the doctor if your fever is over 100 F (37.7 C) (taken under the tongue) or lasts longer than 24 hours.  8. You may have a dry mouth, muscle aches, trouble sleeping or a sore throat.  These symptoms should go away after 24 hours.  9. Do not make important or legal decisions.   Pain Management:      1. Take pain medication (if prescribed) for pain as directed by your physician.        2. WARNING: If the pain medication you have been prescribed contains Tylenol  (acetaminophen), DO NOT take additional doses of Tylenol (acetaminophen).     Call your doctor for any of the followin.  Signs of infection (fever, growing tenderness at the surgery site, severe pain, a large amount of drainage or bleeding, foul-smelling drainage, redness, swelling).    2.  It has been over 8 to 10 hours since surgery and you are still not able to urinate (pee).    3.  Headache for over 24 hours.    4.  Numbness, tingling or weakness the day after surgery (if you had spinal anesthesia).  To contact a doctor, call __________ [phone]________________ or:      [phone] and ask for the Resident On Call for:          _______________Dermatology_______________ (answered 24 hours a day)      Emergency Department:  [facility]: [phone]  [facility]: [phone]               Rev. 10/2014

## 2021-09-03 NOTE — ANESTHESIA PROCEDURE NOTES
Airway       Patient location during procedure: OR  Staff -        CRNA: Washington Law APRN CRNA       Performed By: CRNA  Consent for Airway        Urgency: elective  Indications and Patient Condition       Indications for airway management: dc-procedural       Induction type:intravenous       Mask difficulty assessment: 1 - vent by mask    Final Airway Details       Final airway type: supraglottic airway    Supraglottic Airway Details        Type: LMA       Brand: Ambu AuraGain       LMA size: 4    Post intubation assessment        Placement verified by: capnometry, equal breath sounds and chest rise        Number of attempts at approach: 1       Secured with: silk tape       Ease of procedure: easy       Dentition: Unchanged and Intact

## 2021-09-03 NOTE — DISCHARGE SUMMARY
Physician Discharge Summary     Patient ID:  Maritza Gallardo  [id number]  26 year old  1995    Admit date: 9/3/2021      Pediatric Dermatology  [place] [id number] [facility] [address]  [place] [address]  [phone]    After Care Instructions Following Pulsed Dye or NDYAG Laser Treatment:  What to expect?    You should expect bruising to the treated areas following laser treatment for the next 2-4 weeks. If any bleeding or blistering occurs in the treated area, please notify the clinic.    Each patient is different but some patients receive treatments every 4-6 weeks and others are less frequent. We cannot proceed with further laser treatments until the bruising has resolved.   Care for treated areas    Keep the treated area(s) moist with Vaseline or Aquaphor for several days following treatment, UNTIL BRUISING has cleared.     We strongly recommend sun avoidance after treatment. Use sunscreen (SPF 30 or greater) and wear a wide brimmed hat for any unavoidable sun exposure to treated areas on the face.     You may bathe normally and you may swim in a pool.    You may resume all normal activities following treatment.  Pain Control    If your child has any discomfort, please give Tylenol (Acetaminophen).     Please avoid Ibuprofen when possible, as it can increase bruising.     Cold compresses may be used for swelling.    Please contact our office with questions or concerns at non urgent triage voicemail line at [phone] or call [phone] and ask for the Dermatology resident on call to be paged if it is after business hours, on a weekend or holiday or you feel the matter is urgent

## 2021-09-03 NOTE — ANESTHESIA PREPROCEDURE EVALUATION
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]   Procedure(s):  ND-YAG Laser  Right Upper Extremities     LABS:  CBC:   Lab Results   Component Value Date    WBC 13.6 (H) 2021    WBC 9.7 2020    HGB 14.7 2021    HGB 13.4 2020    HCT 43.4 2021    HCT 39.4 2020     2021     12/15/2020     BMP:   Lab Results   Component Value Date    POTASSIUM 3.8 2021    POTASSIUM 3.8 12/15/2020    CR 0.66 2021    CR 0.80 2020     (H) 2021     (H) 12/15/2020     COAGS:   Lab Results   Component Value Date    PTT 28 2020    INR 0.91 2021    FIBR 459 (H) 2021     POC:   Lab Results   Component Value Date    BGM 77 2021    HCG Negative 2021    HCGS Negative 2017     OTHER:   Lab Results   Component Value Date    ALT 11 2020    AST 9 (A) 2020        Preop Vitals    BP Readings from Last 3 Encounters:   21 (!) 146/79   21 108/57   21 127/82    Pulse Readings from Last 3 Encounters:   21 72   21 70   21 112      Resp Readings from Last 3 Encounters:   21 18   21 28   12/15/20 20    SpO2 Readings from Last 3 Encounters:   21 99%   21 96%   12/15/20 98%      Temp Readings from Last 1 Encounters:   21 36.4  C (97.5  F) (Oral)    Ht Readings from Last 1 Encounters:   21 1.651 m (5' 5\")      Wt Readings from Last 1 Encounters:   21 103.8 kg (228 lb 13.4 oz)    Estimated body mass index is 38.08 kg/m  as calculated from the following:    Height as of 21: 1.651 m (5' 5\").    Weight as of 21: 103.8 kg (228 lb 13.4 oz).     LDA:        Past Medical History:   Diagnosis Date     Amenorrhea      Chronic low back pain      Gastroesophageal reflux disease      PONV " (postoperative nausea and vomiting)      Sleep apnea      Venous malformation     right arm      Past Surgical History:   Procedure Laterality Date     BIOPSY SKIN (LOCATION) Right 7/2/2021    Procedure: 4 mm punch biopsy from the Right arm,;  Surgeon: Ghislaine Bautista MD;  Location: [facility]     IR VEIN SPIDER NON FACE SCLEROTHERAPY  8/25/2020     IR VEIN SPIDER NON FACE SCLEROTHERAPY  9/25/2020     IR VEIN SPIDER NON FACE SCLEROTHERAPY  10/22/2020     IR VEIN SPIDER NON FACE SCLEROTHERAPY  12/15/2020     LASER DERMATOLOGY PROCEDURE IN OR Right 5/7/2021    Procedure: Nd-YAG (1064nm) laser treatment of glomuvenous malformation, Right Arm,;  Surgeon: Ghislaine Bautista MD;  Location: [facility]     LASER DERMATOLOGY PROCEDURE IN OR Right 7/2/2021    Procedure: Nd-YAG (1064nm) laser Right arm,;  Surgeon: Ghislaine Bautista MD;  Location: [facility]     SCLEROTHERAPY      right arm, chest     Sedated MRI      age 6     SURGICAL RADIOLOGY PROCEDURE Right 12/15/2020    Procedure: Right Upper Extremity with sclerotherapy with Bleomycin;  Surgeon: Roseline Marcial MD;  [facility]      Allergies   Allergen Reactions     Adhesive Tape Blisters     Morphine Hives     Seasonal Allergies         Anesthesia Evaluation    ROS/Med Hx    History of anesthetic complications (H/O PONV)  (-) malignant hyperthermia and tuberculosis  Comments: Maritza Paulina has had several anesthetics. Her problem list is as follows:    Vascular malformation  Allergic rhinitis, seasonal  Chronic pain disorder  Current moderate episode of major depressive disorder (H)  Diarrhea  GERD (gastroesophageal reflux disease)  Generalized anxiety disorder  Left knee pain  Lower abdominal pain  Migraine variant  Mucus in stool  Coffee ground emesis  Hematemesis  Non-intractable vomiting with nausea  Obesity (BMI 35.0-39.9 without comorbidity)  Other insomnia  Pilonidal cyst  Morbid obesity (H)    Met with Maritza who  is NPO and has a right upper extremity vascular malformation being treated by laser treatments. Now for a repeat treatment.   She has discomfort related to this malformation.           Cardiovascular Findings   Comments: No history of heart disease;     Neuro Findings   Comments: Stable; no acute issues.    Pulmonary Findings   (+) apnea (uses CPAP at home (intermittently). )  (-) asthma    Apnea  (+) obstructive sleep apnea syndrome    HENT Findings   Comments: No acute issues    Skin Findings   Comments: Right upper extremity vascular anomaly      GI/Hepatic/Renal Findings   (+) GERD and PONV    GERD is well controlled  Comments: Did get Bicitra this AM    Endocrine/Metabolic Findings - negative ROS      Genetic/Syndrome Findings - negative genetics/syndromes ROS    Hematology/Oncology Findings - negative hematology/oncology ROS    Additional Notes  Allergies:   -- Adhesive Tape -- Blisters   -- Morphine -- Hives   -- Seasonal Allergies     Medications Prior to Admission:  medical cannabis (Patient's own supply), See Admin Instructions (The purpose of this order is to document that the patient reports taking medical cannabis.  This is not a prescription, and is not used to certify that the patient has a qualifying medical condition.), Disp: , Rfl:   Biotin 2500 MCG CAPS, Take 2,500 mcg by mouth daily , Disp: , Rfl:   busPIRone (BUSPAR) 15 MG tablet, Take 1 tablet by mouth 3 times daily, Disp: , Rfl:   calcium-vitamin D (CALTRATE) 600-400 MG-UNIT per tablet, Take 1 tablet by mouth daily , Disp: , Rfl:   DULoxetine (CYMBALTA) 20 MG capsule, Take 20 mg by mouth daily , Disp: , Rfl:   DULoxetine (CYMBALTA) 60 MG capsule, Take 60 mg by mouth daily Takes with Cymbalta 20mg for total of 80mg, Disp: , Rfl:   gabapentin (NEURONTIN) 300 MG capsule, Take 2 capsules by mouth daily, Disp: , Rfl:   HYDROcodone-acetaminophen (NORCO) 5-325 MG tablet, Take 1 tablet by mouth every 6 hours as needed for severe pain (Patient not  taking: Reported on 4/7/2021), Disp: 12 tablet, Rfl: 0  ketoconazole (NIZORAL) 2 % shampoo, Apply topically daily as needed for itching or irritation, Disp: , Rfl:   loratadine (CLARITIN) 10 MG tablet, Take 10 mg by mouth daily, Disp: , Rfl:   LORYNA 3-0.02 MG tablet, Take 1 tablet by mouth daily, Disp: , Rfl:   meclizine (ANTIVERT) 25 MG tablet, Take 25 mg by mouth as needed , Disp: , Rfl:   medical cannabis (Patient's own supply), by Other route See Admin Instructions (The purpose of this order is to document that the patient reports taking medical cannabis.  This is not a prescription, and is not used to certify that the patient has a qualifying medical condition.), Disp: , Rfl:   order for DME, Please measure and distribute 1 custom compression sleeve garment of 15mmHg - 20mmHg fingerless, measuring from fingers to up upper arm.  Pt with extensive vascular malformation that involves whole arm. (Patient not taking: Reported on 4/7/2021), Disp: 1 each, Rfl: 0  oxyCODONE (ROXICODONE) 5 MG tablet, Take 2 tablets (10 mg) by mouth every 4 hours as needed for moderate to severe pain (Patient not taking: Reported on 4/7/2021), Disp: 20 tablet, Rfl: 0  pantoprazole (PROTONIX) 40 MG EC tablet, Take 1 tablet by mouth daily, Disp: , Rfl:   spironolactone (ALDACTONE) 50 MG tablet, Take 50 mg by mouth daily, Disp: , Rfl:   sucralfate (CARAFATE) 1 GM tablet, 3 times daily , Disp: , Rfl:   topiramate (TOPAMAX) 50 MG tablet, Take 100 mg by mouth 2 times daily , Disp: , Rfl:   triamcinolone (KENALOG) 0.025 % cream, Apply topically 2 times daily as needed , Disp: , Rfl:             PHYSICAL EXAM:   Mental Status/Neuro: A/A/O   Airway: Facies: Feasible  Mallampati: II  Mouth/Opening: Full  TM distance: > 6 cm  Neck ROM: Full   Respiratory: Auscultation: CTAB     Resp. Rate: Normal     Resp. Effort: Normal      CV: Rhythm: Regular  Rate: Age appropriate  Heart: Normal Sounds  Edema: None   Comments: Dental: No acute issues                      Anesthesia Plan    ASA Status:  3   NPO Status:  NPO Appropriate    Anesthesia Type: General.     - Airway: LMA   Induction: Intravenous, Propofol.   Maintenance: TIVA.        Consents    Anesthesia Plan(s) and associated risks, benefits, and realistic alternatives discussed. Questions answered and patient/representative(s) expressed understanding.     - Discussed with:  Patient      - Extended Intubation/Ventilatory Support Discussed: No.      - Patient is DNR/DNI Status: No    Use of blood products discussed: No .     Postoperative Care    Pain management: IV analgesics, Oral pain medications.   PONV prophylaxis: Dexamethasone or Solumedrol, Ondansetron (or other 5HT-3)     Comments:    She requests anesthesia. Procedures and risks explained. She understood and consented. Qs answered.          Gamaliel Van MD

## 2021-09-03 NOTE — ANESTHESIA POSTPROCEDURE EVALUATION
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    Anesthesia Type:  General    Note:  Disposition: Outpatient   Postop Pain Control: Uneventful            Sign Out: Well controlled pain   PONV: No   Neuro/Psych: Uneventful            Sign Out: Acceptable/Baseline neuro status   Airway/Respiratory: Uneventful            Sign Out: Acceptable/Baseline resp. status   CV/Hemodynamics: Uneventful            Sign Out: Acceptable CV status; No obvious hypovolemia; No obvious fluid overload   Other NRE: NONE   DID A NON-ROUTINE EVENT OCCUR? No    Event details/Postop Comments:  Awakening satisfactorily; strong; breathing well; comfortable; oriented; no complaints or complications; may consider an upper extremity block the next time for this procedure.            Last vitals:  Vitals Value Taken Time   /71 09/03/21 1015   Temp 36.8  C (98.2  F) 09/03/21 1015   Pulse 79 09/03/21 1015   Resp 13 09/03/21 1015   SpO2 97 % 09/03/21 1015       Electronically Signed By: Gamaliel Van MD  September 3, 2021  12:18 PM

## 2022-01-03 ENCOUNTER — TELEPHONE (OUTPATIENT)
Dept: DERMATOLOGY | Facility: CLINIC | Age: 27
End: 2022-01-03
Payer: COMMERCIAL

## 2022-01-03 DIAGNOSIS — Q27.8 GLOMUVENOUS MALFORMATION: Primary | ICD-10-CM

## 2022-01-03 NOTE — LETTER
January 4, 2022      TO: Maritza Gallardo  [address]  [facility] [address]            Dear Maritza,     You are scheduled for the following general sedation procedures for ND YAG laser treatment with Dr. Bautista on:     Wednesday March 2nd at 1:30 pm (arrive 90 minutes prior)       For each case you will need to check into the surgery check in on the 3rd floor of the [address] [place] [address], 90 minutes prior to your scheduled times.      You must complete a history & physical for each of these procedures no sooner than 30 days prior to each of these cases. Have these results faxed to pre-admission nursing at [phone]     You will also need to complete a COVID19 PCR test, and resulted within 4 days of each of these cases. Please bring these results with you on this day and have these faxed to [phone].      Pre-admission nursing will be contacting you several days prior to these appointments to provide further details.     Here is the additional information you requested from Dr. Bautista  After three full treatments, I would expect at least some improvement in color, thickness and/or pain. I'm happy to keep going for a total of 6 treatments if Maritza would like that.   Otherwise I do think that a pain clinic could be helpful as well. There is no perfect way to totally remove the birthmark, but again, we could consider targeting certain areas for sclerothearpy or even surgical removal.      It might help to have Maritza follow up comprehensively in [facility]     If you have any questions or concerns prior to this time, please call our clinic at [phone].         Sincerely,        Bri Schwab, RN Care Coordinator  Pediatric Dermatology Clinic  [facility]

## 2022-01-03 NOTE — TELEPHONE ENCOUNTER
"Pt called clinic inquiring about the \"next steps\" Pt completed her last ND YAG treatment 9/3/21, pt denied any infections since this time. Pt denied any blistering following treatment and stated, \"everything healed well.\" Pt is wondering what Dr. Bautista thinks about treatments. Maritza reports \"there is still a lot of pain in my arm. Using it has been difficult.\" RN inquired if she felt the laser treatments helped? Pt stated, \"I  Honestly don't know. My are is still in the same amount of pain. I am not sure if there is something else since we know for type this is or if we should continue with the laser?' RN explained she would follow up with Dr. Bautista for a further plan of care and then contact pt back. Maritza verbalized understanding and denied questions or concerns. Routed to Dr. Bautista   "

## 2022-01-04 ENCOUNTER — HOSPITAL ENCOUNTER (OUTPATIENT)
Facility: CLINIC | Age: 27
End: 2022-01-04
Attending: DERMATOLOGY | Admitting: DERMATOLOGY
Payer: COMMERCIAL

## 2022-01-04 NOTE — TELEPHONE ENCOUNTER
"RN contacted Maritza, read message from Dr. Mann. Pt stated, \"if she thinks more laser will help, lets do that for now.' RN verbalized understanding, inquired about [facility] appt, pt declined at this time. RN spoke to pts about dates and times for OR procedure, pt was agreeable to \"any dates.' RN explained she would follow up with the OR and then contact pt. Pt verbalized understanding and denied questions or concerns.     RN contacted OR spoke to Jose, RN inquired about the following dates, 2/4 no time available only waitlist, 2/18 0730 was offered, provider unable to arrive this early, 3/4- not times available only waitlist. [facility] explained she did not have any Friday time available because of providers blocked schedules on Fridays until June. RN inquired about Feb 2nd PM no time available March 2nd 1:30 pm was available. RN accepted date and time. Pre admissions to call pt prior to visit to review diet, arrival time, COVID testing etc. RN verbalized understanding. Providers schedule blocked to allow for this time.     RN contacted Maritza,  Update pt with date, arrival time, reviewed diet restrictions, need for H & P and COVID test, Pt requested the previous information from Dr. mann be included in her letter. This updated and letter mailed to pts home. Pt verbalized understanding and denied questions or concerns.   "

## 2022-01-04 NOTE — TELEPHONE ENCOUNTER
After three full treatments, I would expect at least some improvement in color, thickness and/or pain. I'm happy to keep going for a total of 6 treatments if Maritza would like that.   Otherwise I do think that a pain clinic could be helpful as well. There is no perfect way to totally remove the birthmark, but again, we could consider targeting certain areas for sclerothearpy or even surgical removal.     It might help to have Maritza follow up comprehensively in [facility].     RONEY

## 2022-02-08 DIAGNOSIS — Z11.59 ENCOUNTER FOR SCREENING FOR OTHER VIRAL DISEASES: Primary | ICD-10-CM

## 2022-02-21 ENCOUNTER — TELEPHONE (OUTPATIENT)
Dept: DERMATOLOGY | Facility: CLINIC | Age: 27
End: 2022-02-21
Payer: COMMERCIAL

## 2022-02-21 DIAGNOSIS — Q27.8 GLOMUVENOUS MALFORMATION: Primary | ICD-10-CM

## 2022-02-21 NOTE — TELEPHONE ENCOUNTER
[facility] Call Center    Phone Message    May a detailed message be left on voicemail: yes     Reason for Call: Other: pain managment      Pt requested vernell contact Dr. Bautista regarding pain management. Did not provide additional details or questions and is just requesting a call back.  Action Taken: Message routed to:  Other: peds derm    Travel Screening: Not Applicable

## 2022-02-21 NOTE — TELEPHONE ENCOUNTER
RN received call back from patient. She states she and her grandmother have been talking a lot and feel that they would rather cancel the laser procedure as it has not been that helpful and proceed with the pain clinic. Patient states she is hoping for something closer to her but she does not know of any pain clinics near her. RN not familiar with pain clinic referral nor locations. RN noted information would be routed to Dr. Bautista and RN would follow up with patient this week. Patient in agreement.     Patient requesting to cancel upcoming sedated laser treatment. RN will send message to appropriate staff.

## 2022-02-26 ENCOUNTER — TELEPHONE (OUTPATIENT)
Dept: ANESTHESIOLOGY | Facility: CLINIC | Age: 27
End: 2022-02-26
Payer: COMMERCIAL

## 2022-02-26 NOTE — TELEPHONE ENCOUNTER
I spoke with Maritza today about scheduling an appointment at the [facility] [place], where the referral was sent to. Maritza said, due to financial concerns, they are not able to drive down to the [facility] [place]. Maritza would like a referral sent to a clinic closer to where they live. I explained I do not have those resources, however I would reach out to the referring provider to follow up with them. I sent a message to the referring provider, Ghislaine Bautista MD. I included my lead, Maribel Mendez, as I only work weekends and am not able to follow up on messages during the week.

## 2022-03-07 ENCOUNTER — TELEPHONE (OUTPATIENT)
Dept: DERMATOLOGY | Facility: CLINIC | Age: 27
End: 2022-03-07
Payer: COMMERCIAL

## 2022-03-07 NOTE — TELEPHONE ENCOUNTER
Maritza called to check on status of pain management clinic referral. Maritza states that she spoke with someone about scheduling in the [facility] but was inquiring about a referral closer to home. Maritza states she never heard back from anyone. Maritza also inquiring about the ability to be seen virtually through the pain management clinic at the [facility]. Maritza states it is just too hard to make it to the [facility] for repeat visits. RN explained that information would be routed both to the pain management team at [facility] inquiring about possibility of completing virtual visits but also to Dr. Bautista to see if she would be willing to provide referral closer to home. Pt in agreement.

## 2022-03-08 NOTE — TELEPHONE ENCOUNTER
"Contacted [facility] this am, message from Dr. Bautista was explained.. Pt stated,\"i'm getting very frustrated. I don't have the money to fork out to get there.\" RN apologized. Inquired if pt had spoken to her primary care provider about someone local she could refer/recommend? Shanda stated, \"she's still not back for another month.\" RN explained to pt she could still call the primary cares office for this information as likely her ancillary staff may know. Pt was agreeable. RN inquired if pt would like to schedule more laser? Pt stated, \"My grandma says theres no point to spending the money, so I don't think do.\" RN verbalized understanding, apologized for not being able to assist more and ask [facility] to call back should she elect to schedule more lasers sessions or with additional questions or concerns. shanda verbalized understanding and denied questions or concerns.   "

## 2022-03-08 NOTE — TELEPHONE ENCOUNTER
"Armando Montgomery,   The pain team here did not give me a recommendation for a clinic closer to her home. I don't know of one myself, so I'm not sure how to advise at this time. Maritza is welcome to try and find a pain clinic independently, or make a visit down here to see our team.   I\"m sorry I can't be more helpful.   [facility]  "

## 2023-11-14 NOTE — OR NURSING
Attempted to page dermatology fellow for discharge instructions/orders. Did not receive call back. Per derm note, pt received discharge instructions and has had this procedure already. Pt comfortable with plan and will contact clinic with concerns. Pt discharged home at 1500.   
Yes past 3 months

## (undated) DEVICE — SOL WATER IRRIG 1000ML BOTTLE 2F7114

## (undated) DEVICE — Device

## (undated) DEVICE — LINEN TOWEL PACK X5 5464

## (undated) DEVICE — SPECIMEN CONTAINER W/10% BUFFERED FORMALIN 120ML 591201

## (undated) DEVICE — POSITIONER HEAD DONUT FOAM 9" LF FP-HEAD9

## (undated) DEVICE — STRAP KNEE/BODY 31143004

## (undated) RX ORDER — OXYCODONE HYDROCHLORIDE 5 MG/1
TABLET ORAL
Status: DISPENSED
Start: 2017-04-17

## (undated) RX ORDER — LIDOCAINE HYDROCHLORIDE 20 MG/ML
INJECTION, SOLUTION EPIDURAL; INFILTRATION; INTRACAUDAL; PERINEURAL
Status: DISPENSED
Start: 2020-09-25

## (undated) RX ORDER — ACETAMINOPHEN 325 MG/1
TABLET ORAL
Status: DISPENSED
Start: 2021-07-02

## (undated) RX ORDER — FENTANYL CITRATE 50 UG/ML
INJECTION, SOLUTION INTRAMUSCULAR; INTRAVENOUS
Status: DISPENSED
Start: 2021-07-02

## (undated) RX ORDER — CEFAZOLIN SODIUM 2 G/100ML
INJECTION, SOLUTION INTRAVENOUS
Status: DISPENSED
Start: 2020-12-15

## (undated) RX ORDER — PROPOFOL 10 MG/ML
INJECTION, EMULSION INTRAVENOUS
Status: DISPENSED
Start: 2020-08-25

## (undated) RX ORDER — LIDOCAINE HYDROCHLORIDE 10 MG/ML
INJECTION, SOLUTION EPIDURAL; INFILTRATION; INTRACAUDAL; PERINEURAL
Status: DISPENSED
Start: 2017-10-09

## (undated) RX ORDER — FENTANYL CITRATE 50 UG/ML
INJECTION, SOLUTION INTRAMUSCULAR; INTRAVENOUS
Status: DISPENSED
Start: 2021-09-03

## (undated) RX ORDER — SODIUM TETRADECYL SULFATE 30 MG/ML
INJECTION, SOLUTION INTRAVENOUS
Status: DISPENSED
Start: 2017-05-09

## (undated) RX ORDER — OXYCODONE HYDROCHLORIDE 5 MG/1
TABLET ORAL
Status: DISPENSED
Start: 2020-12-15

## (undated) RX ORDER — HYDROMORPHONE HYDROCHLORIDE 1 MG/ML
INJECTION, SOLUTION INTRAMUSCULAR; INTRAVENOUS; SUBCUTANEOUS
Status: DISPENSED
Start: 2020-10-22

## (undated) RX ORDER — PROPOFOL 10 MG/ML
INJECTION, EMULSION INTRAVENOUS
Status: DISPENSED
Start: 2017-10-09

## (undated) RX ORDER — LIDOCAINE HYDROCHLORIDE 20 MG/ML
INJECTION, SOLUTION EPIDURAL; INFILTRATION; INTRACAUDAL; PERINEURAL
Status: DISPENSED
Start: 2020-10-22

## (undated) RX ORDER — FENTANYL CITRATE 50 UG/ML
INJECTION, SOLUTION INTRAMUSCULAR; INTRAVENOUS
Status: DISPENSED
Start: 2017-10-09

## (undated) RX ORDER — FENTANYL CITRATE 50 UG/ML
INJECTION, SOLUTION INTRAMUSCULAR; INTRAVENOUS
Status: DISPENSED
Start: 2020-12-15

## (undated) RX ORDER — FENTANYL CITRATE 50 UG/ML
INJECTION, SOLUTION INTRAMUSCULAR; INTRAVENOUS
Status: DISPENSED
Start: 2020-08-25

## (undated) RX ORDER — DIPHENHYDRAMINE HYDROCHLORIDE 50 MG/ML
INJECTION INTRAMUSCULAR; INTRAVENOUS
Status: DISPENSED
Start: 2017-05-09

## (undated) RX ORDER — ACETAMINOPHEN 500 MG
TABLET ORAL
Status: DISPENSED
Start: 2017-04-17

## (undated) RX ORDER — PROPOFOL 10 MG/ML
INJECTION, EMULSION INTRAVENOUS
Status: DISPENSED
Start: 2021-07-02

## (undated) RX ORDER — ACETAMINOPHEN 325 MG/1
TABLET ORAL
Status: DISPENSED
Start: 2020-12-15

## (undated) RX ORDER — FENTANYL CITRATE 50 UG/ML
INJECTION, SOLUTION INTRAMUSCULAR; INTRAVENOUS
Status: DISPENSED
Start: 2021-05-07

## (undated) RX ORDER — PROPOFOL 10 MG/ML
INJECTION, EMULSION INTRAVENOUS
Status: DISPENSED
Start: 2020-10-22

## (undated) RX ORDER — ROCURONIUM BROMIDE 50 MG/5 ML
SYRINGE (ML) INTRAVENOUS
Status: DISPENSED
Start: 2017-10-09

## (undated) RX ORDER — PROPOFOL 10 MG/ML
INJECTION, EMULSION INTRAVENOUS
Status: DISPENSED
Start: 2020-09-25

## (undated) RX ORDER — CEFAZOLIN SODIUM 1 G/3ML
INJECTION, POWDER, FOR SOLUTION INTRAMUSCULAR; INTRAVENOUS
Status: DISPENSED
Start: 2017-04-17

## (undated) RX ORDER — HYDROMORPHONE HYDROCHLORIDE 1 MG/ML
INJECTION, SOLUTION INTRAMUSCULAR; INTRAVENOUS; SUBCUTANEOUS
Status: DISPENSED
Start: 2020-09-25

## (undated) RX ORDER — EPHEDRINE SULFATE 50 MG/ML
INJECTION, SOLUTION INTRAMUSCULAR; INTRAVENOUS; SUBCUTANEOUS
Status: DISPENSED
Start: 2020-08-25

## (undated) RX ORDER — CEFAZOLIN SODIUM 2 G/100ML
INJECTION, SOLUTION INTRAVENOUS
Status: DISPENSED
Start: 2017-05-09

## (undated) RX ORDER — FENTANYL CITRATE-0.9 % NACL/PF 10 MCG/ML
PLASTIC BAG, INJECTION (ML) INTRAVENOUS
Status: DISPENSED
Start: 2020-08-25

## (undated) RX ORDER — OXYCODONE HYDROCHLORIDE 5 MG/1
TABLET ORAL
Status: DISPENSED
Start: 2020-10-22

## (undated) RX ORDER — GLYCOPYRROLATE 0.2 MG/ML
INJECTION INTRAMUSCULAR; INTRAVENOUS
Status: DISPENSED
Start: 2020-12-15

## (undated) RX ORDER — ALBUMIN, HUMAN INJ 5% 5 %
SOLUTION INTRAVENOUS
Status: DISPENSED
Start: 2020-09-25

## (undated) RX ORDER — CEFAZOLIN SODIUM 2 G/100ML
INJECTION, SOLUTION INTRAVENOUS
Status: DISPENSED
Start: 2020-08-25

## (undated) RX ORDER — HYDROMORPHONE HYDROCHLORIDE 1 MG/ML
INJECTION, SOLUTION INTRAMUSCULAR; INTRAVENOUS; SUBCUTANEOUS
Status: DISPENSED
Start: 2020-12-15

## (undated) RX ORDER — ONDANSETRON 2 MG/ML
INJECTION INTRAMUSCULAR; INTRAVENOUS
Status: DISPENSED
Start: 2017-10-09

## (undated) RX ORDER — ALBUMIN (HUMAN) 12.5 G/50ML
SOLUTION INTRAVENOUS
Status: DISPENSED
Start: 2020-09-25

## (undated) RX ORDER — LIDOCAINE HYDROCHLORIDE 20 MG/ML
INJECTION, SOLUTION EPIDURAL; INFILTRATION; INTRACAUDAL; PERINEURAL
Status: DISPENSED
Start: 2017-10-09

## (undated) RX ORDER — HYDROMORPHONE HYDROCHLORIDE 1 MG/ML
INJECTION, SOLUTION INTRAMUSCULAR; INTRAVENOUS; SUBCUTANEOUS
Status: DISPENSED
Start: 2021-09-03

## (undated) RX ORDER — HYDROMORPHONE HYDROCHLORIDE 1 MG/ML
INJECTION, SOLUTION INTRAMUSCULAR; INTRAVENOUS; SUBCUTANEOUS
Status: DISPENSED
Start: 2021-07-02

## (undated) RX ORDER — HYDROMORPHONE HYDROCHLORIDE 1 MG/ML
INJECTION, SOLUTION INTRAMUSCULAR; INTRAVENOUS; SUBCUTANEOUS
Status: DISPENSED
Start: 2020-08-25

## (undated) RX ORDER — LIDOCAINE HYDROCHLORIDE 20 MG/ML
INJECTION, SOLUTION EPIDURAL; INFILTRATION; INTRACAUDAL; PERINEURAL
Status: DISPENSED
Start: 2020-12-15

## (undated) RX ORDER — FENTANYL CITRATE 50 UG/ML
INJECTION, SOLUTION INTRAMUSCULAR; INTRAVENOUS
Status: DISPENSED
Start: 2020-10-22

## (undated) RX ORDER — GLYCOPYRROLATE 0.2 MG/ML
INJECTION INTRAMUSCULAR; INTRAVENOUS
Status: DISPENSED
Start: 2021-05-07

## (undated) RX ORDER — KETOROLAC TROMETHAMINE 30 MG/ML
INJECTION, SOLUTION INTRAMUSCULAR; INTRAVENOUS
Status: DISPENSED
Start: 2020-08-25

## (undated) RX ORDER — OXYCODONE HYDROCHLORIDE 5 MG/1
TABLET ORAL
Status: DISPENSED
Start: 2021-07-02

## (undated) RX ORDER — ONDANSETRON 2 MG/ML
INJECTION INTRAMUSCULAR; INTRAVENOUS
Status: DISPENSED
Start: 2020-12-15

## (undated) RX ORDER — ONDANSETRON 2 MG/ML
INJECTION INTRAMUSCULAR; INTRAVENOUS
Status: DISPENSED
Start: 2020-10-22

## (undated) RX ORDER — FENTANYL CITRATE 50 UG/ML
INJECTION, SOLUTION INTRAMUSCULAR; INTRAVENOUS
Status: DISPENSED
Start: 2017-04-17

## (undated) RX ORDER — DEXAMETHASONE SODIUM PHOSPHATE 4 MG/ML
INJECTION, SOLUTION INTRA-ARTICULAR; INTRALESIONAL; INTRAMUSCULAR; INTRAVENOUS; SOFT TISSUE
Status: DISPENSED
Start: 2020-09-25

## (undated) RX ORDER — ACETAMINOPHEN 325 MG/1
TABLET ORAL
Status: DISPENSED
Start: 2021-05-07

## (undated) RX ORDER — ACETAMINOPHEN 325 MG/1
TABLET ORAL
Status: DISPENSED
Start: 2021-09-03

## (undated) RX ORDER — DEXAMETHASONE SODIUM PHOSPHATE 4 MG/ML
INJECTION, SOLUTION INTRA-ARTICULAR; INTRALESIONAL; INTRAMUSCULAR; INTRAVENOUS; SOFT TISSUE
Status: DISPENSED
Start: 2021-05-07

## (undated) RX ORDER — ONDANSETRON 2 MG/ML
INJECTION INTRAMUSCULAR; INTRAVENOUS
Status: DISPENSED
Start: 2020-08-25

## (undated) RX ORDER — FENTANYL CITRATE 50 UG/ML
INJECTION, SOLUTION INTRAMUSCULAR; INTRAVENOUS
Status: DISPENSED
Start: 2017-05-09

## (undated) RX ORDER — OXYCODONE HYDROCHLORIDE 5 MG/1
TABLET ORAL
Status: DISPENSED
Start: 2020-08-25

## (undated) RX ORDER — HYDROMORPHONE HYDROCHLORIDE 1 MG/ML
INJECTION, SOLUTION INTRAMUSCULAR; INTRAVENOUS; SUBCUTANEOUS
Status: DISPENSED
Start: 2017-10-09

## (undated) RX ORDER — ACETAMINOPHEN 325 MG/10.15ML
LIQUID ORAL
Status: DISPENSED
Start: 2020-10-22

## (undated) RX ORDER — SODIUM TETRADECYL SULFATE 30 MG/ML
INJECTION, SOLUTION INTRAVENOUS
Status: DISPENSED
Start: 2020-08-25

## (undated) RX ORDER — DIPHENHYDRAMINE HYDROCHLORIDE 50 MG/ML
INJECTION INTRAMUSCULAR; INTRAVENOUS
Status: DISPENSED
Start: 2020-10-22

## (undated) RX ORDER — ALBUMIN (HUMAN) 12.5 G/50ML
SOLUTION INTRAVENOUS
Status: DISPENSED
Start: 2020-10-22

## (undated) RX ORDER — HYDROCODONE BITARTRATE AND ACETAMINOPHEN 10; 325 MG/1; MG/1
TABLET ORAL
Status: DISPENSED
Start: 2020-09-25

## (undated) RX ORDER — OXYCODONE HYDROCHLORIDE 5 MG/1
TABLET ORAL
Status: DISPENSED
Start: 2017-05-09

## (undated) RX ORDER — ONDANSETRON 2 MG/ML
INJECTION INTRAMUSCULAR; INTRAVENOUS
Status: DISPENSED
Start: 2021-05-07

## (undated) RX ORDER — OXYCODONE HYDROCHLORIDE 5 MG/1
TABLET ORAL
Status: DISPENSED
Start: 2017-10-09

## (undated) RX ORDER — DEXAMETHASONE SODIUM PHOSPHATE 4 MG/ML
INJECTION, SOLUTION INTRA-ARTICULAR; INTRALESIONAL; INTRAMUSCULAR; INTRAVENOUS; SOFT TISSUE
Status: DISPENSED
Start: 2017-10-09

## (undated) RX ORDER — FENTANYL CITRATE 50 UG/ML
INJECTION, SOLUTION INTRAMUSCULAR; INTRAVENOUS
Status: DISPENSED
Start: 2020-09-25

## (undated) RX ORDER — LIDOCAINE HYDROCHLORIDE 10 MG/ML
INJECTION, SOLUTION EPIDURAL; INFILTRATION; INTRACAUDAL; PERINEURAL
Status: DISPENSED
Start: 2020-10-22

## (undated) RX ORDER — CEFAZOLIN SODIUM 1 G/3ML
INJECTION, POWDER, FOR SOLUTION INTRAMUSCULAR; INTRAVENOUS
Status: DISPENSED
Start: 2020-08-25

## (undated) RX ORDER — LIDOCAINE HYDROCHLORIDE 10 MG/ML
INJECTION, SOLUTION EPIDURAL; INFILTRATION; INTRACAUDAL; PERINEURAL
Status: DISPENSED
Start: 2020-09-25

## (undated) RX ORDER — KETOROLAC TROMETHAMINE 30 MG/ML
INJECTION, SOLUTION INTRAMUSCULAR; INTRAVENOUS
Status: DISPENSED
Start: 2017-05-09

## (undated) RX ORDER — DEXAMETHASONE SODIUM PHOSPHATE 4 MG/ML
INJECTION, SOLUTION INTRA-ARTICULAR; INTRALESIONAL; INTRAMUSCULAR; INTRAVENOUS; SOFT TISSUE
Status: DISPENSED
Start: 2020-10-22

## (undated) RX ORDER — ALBUMIN (HUMAN) 12.5 G/50ML
SOLUTION INTRAVENOUS
Status: DISPENSED
Start: 2020-12-15

## (undated) RX ORDER — LIDOCAINE HYDROCHLORIDE 20 MG/ML
INJECTION, SOLUTION EPIDURAL; INFILTRATION; INTRACAUDAL; PERINEURAL
Status: DISPENSED
Start: 2021-05-07

## (undated) RX ORDER — ACETAMINOPHEN 325 MG/1
TABLET ORAL
Status: DISPENSED
Start: 2020-09-25

## (undated) RX ORDER — PROPOFOL 10 MG/ML
INJECTION, EMULSION INTRAVENOUS
Status: DISPENSED
Start: 2020-12-15

## (undated) RX ORDER — CEFAZOLIN SODIUM 2 G/100ML
INJECTION, SOLUTION INTRAVENOUS
Status: DISPENSED
Start: 2017-10-09

## (undated) RX ORDER — SODIUM CHLORIDE 9 MG/ML
INJECTION, SOLUTION INTRAVENOUS
Status: DISPENSED
Start: 2017-05-09

## (undated) RX ORDER — ONDANSETRON 2 MG/ML
INJECTION INTRAMUSCULAR; INTRAVENOUS
Status: DISPENSED
Start: 2020-09-25

## (undated) RX ORDER — CEFAZOLIN SODIUM 1 G/3ML
INJECTION, POWDER, FOR SOLUTION INTRAMUSCULAR; INTRAVENOUS
Status: DISPENSED
Start: 2020-09-25

## (undated) RX ORDER — ONDANSETRON 2 MG/ML
INJECTION INTRAMUSCULAR; INTRAVENOUS
Status: DISPENSED
Start: 2021-09-03

## (undated) RX ORDER — CITRIC ACID/SODIUM CITRATE 334-500MG
SOLUTION, ORAL ORAL
Status: DISPENSED
Start: 2021-09-03

## (undated) RX ORDER — DIPHENHYDRAMINE HYDROCHLORIDE 50 MG/ML
INJECTION INTRAMUSCULAR; INTRAVENOUS
Status: DISPENSED
Start: 2020-12-15